# Patient Record
Sex: MALE | Race: BLACK OR AFRICAN AMERICAN | Employment: UNEMPLOYED | URBAN - METROPOLITAN AREA
[De-identification: names, ages, dates, MRNs, and addresses within clinical notes are randomized per-mention and may not be internally consistent; named-entity substitution may affect disease eponyms.]

---

## 2020-01-01 ENCOUNTER — APPOINTMENT (OUTPATIENT)
Dept: GENERAL RADIOLOGY | Age: 60
DRG: 192 | End: 2020-01-01
Attending: EMERGENCY MEDICINE
Payer: MEDICAID

## 2020-01-01 ENCOUNTER — APPOINTMENT (OUTPATIENT)
Dept: GENERAL RADIOLOGY | Age: 60
DRG: 192 | End: 2020-01-01
Attending: STUDENT IN AN ORGANIZED HEALTH CARE EDUCATION/TRAINING PROGRAM
Payer: MEDICAID

## 2020-01-01 ENCOUNTER — APPOINTMENT (OUTPATIENT)
Dept: NON INVASIVE DIAGNOSTICS | Age: 60
DRG: 192 | End: 2020-01-01
Attending: STUDENT IN AN ORGANIZED HEALTH CARE EDUCATION/TRAINING PROGRAM
Payer: MEDICAID

## 2020-01-01 ENCOUNTER — DOCUMENTATION ONLY (OUTPATIENT)
Dept: PULMONOLOGY | Age: 60
End: 2020-01-01

## 2020-01-01 ENCOUNTER — HOSPITAL ENCOUNTER (EMERGENCY)
Age: 60
Discharge: HOME | End: 2020-10-21
Attending: EMERGENCY MEDICINE | Admitting: EMERGENCY MEDICINE
Payer: MEDICAID

## 2020-01-01 ENCOUNTER — APPOINTMENT (OUTPATIENT)
Dept: ULTRASOUND IMAGING | Age: 60
DRG: 192 | End: 2020-01-01
Attending: INTERNAL MEDICINE
Payer: MEDICAID

## 2020-01-01 ENCOUNTER — APPOINTMENT (OUTPATIENT)
Dept: GENERAL RADIOLOGY | Age: 60
End: 2020-01-01
Attending: EMERGENCY MEDICINE
Payer: MEDICAID

## 2020-01-01 ENCOUNTER — PATIENT OUTREACH (OUTPATIENT)
Dept: CASE MANAGEMENT | Age: 60
End: 2020-01-01

## 2020-01-01 ENCOUNTER — HOSPITAL ENCOUNTER (INPATIENT)
Age: 60
LOS: 4 days | Discharge: HOME | DRG: 192 | End: 2020-10-30
Attending: EMERGENCY MEDICINE | Admitting: HOSPITALIST
Payer: MEDICAID

## 2020-01-01 ENCOUNTER — APPOINTMENT (OUTPATIENT)
Dept: CT IMAGING | Age: 60
DRG: 192 | End: 2020-01-01
Attending: EMERGENCY MEDICINE
Payer: MEDICAID

## 2020-01-01 ENCOUNTER — TRANSCRIBE ORDER (OUTPATIENT)
Dept: CARDIAC CATH/INVASIVE PROCEDURES | Age: 60
End: 2020-01-01

## 2020-01-01 ENCOUNTER — HOSPITAL ENCOUNTER (OUTPATIENT)
Dept: NON INVASIVE DIAGNOSTICS | Age: 60
Discharge: HOME | End: 2020-10-30

## 2020-01-01 VITALS
HEART RATE: 68 BPM | TEMPERATURE: 97.3 F | RESPIRATION RATE: 18 BRPM | WEIGHT: 176.4 LBS | OXYGEN SATURATION: 98 % | BODY MASS INDEX: 26.13 KG/M2 | HEIGHT: 69 IN | DIASTOLIC BLOOD PRESSURE: 79 MMHG | SYSTOLIC BLOOD PRESSURE: 121 MMHG

## 2020-01-01 VITALS
OXYGEN SATURATION: 98 % | SYSTOLIC BLOOD PRESSURE: 169 MMHG | HEART RATE: 86 BPM | DIASTOLIC BLOOD PRESSURE: 94 MMHG | RESPIRATION RATE: 16 BRPM

## 2020-01-01 DIAGNOSIS — I25.5 ISCHEMIC CARDIOMYOPATHY: ICD-10-CM

## 2020-01-01 DIAGNOSIS — E87.1 HYPONATREMIA: ICD-10-CM

## 2020-01-01 DIAGNOSIS — Z20.822 SUSPECTED COVID-19 VIRUS INFECTION: Primary | ICD-10-CM

## 2020-01-01 DIAGNOSIS — J20.9 ACUTE BRONCHITIS, UNSPECIFIED ORGANISM: ICD-10-CM

## 2020-01-01 DIAGNOSIS — I38 ENDOCARDITIS: Primary | ICD-10-CM

## 2020-01-01 DIAGNOSIS — J18.9 MULTIFOCAL PNEUMONIA: ICD-10-CM

## 2020-01-01 DIAGNOSIS — Z20.822 SUSPECTED COVID-19 VIRUS INFECTION: ICD-10-CM

## 2020-01-01 DIAGNOSIS — F17.210 CIGARETTE NICOTINE DEPENDENCE WITHOUT COMPLICATION: ICD-10-CM

## 2020-01-01 DIAGNOSIS — J81.0 ACUTE PULMONARY EDEMA (HCC): ICD-10-CM

## 2020-01-01 DIAGNOSIS — J96.01 ACUTE HYPOXEMIC RESPIRATORY FAILURE (HCC): Primary | ICD-10-CM

## 2020-01-01 DIAGNOSIS — E87.6 HYPOKALEMIA: ICD-10-CM

## 2020-01-01 DIAGNOSIS — I42.9 CARDIOMYOPATHY, UNSPECIFIED TYPE (HCC): ICD-10-CM

## 2020-01-01 DIAGNOSIS — I50.9 ACUTE ON CHRONIC CONGESTIVE HEART FAILURE, UNSPECIFIED HEART FAILURE TYPE (HCC): ICD-10-CM

## 2020-01-01 DIAGNOSIS — I50.23 SYSTOLIC CHF, ACUTE ON CHRONIC (HCC): ICD-10-CM

## 2020-01-01 DIAGNOSIS — J96.01 ACUTE RESPIRATORY FAILURE WITH HYPOXIA (HCC): ICD-10-CM

## 2020-01-01 DIAGNOSIS — R06.02 SHORTNESS OF BREATH: ICD-10-CM

## 2020-01-01 LAB
ALBUMIN SERPL-MCNC: 3.1 G/DL (ref 3.4–5)
ALBUMIN SERPL-MCNC: 3.1 G/DL (ref 3.4–5)
ALBUMIN SERPL-MCNC: 3.5 G/DL (ref 3.4–5)
ALBUMIN SERPL-MCNC: 3.6 G/DL (ref 3.4–5)
ALBUMIN/GLOB SERPL: 0.8 {RATIO} (ref 0.8–1.7)
ALP SERPL-CCNC: 72 U/L (ref 45–117)
ALP SERPL-CCNC: 86 U/L (ref 45–117)
ALP SERPL-CCNC: 93 U/L (ref 45–117)
ALT SERPL-CCNC: 19 U/L (ref 16–61)
ALT SERPL-CCNC: 23 U/L (ref 16–61)
ALT SERPL-CCNC: 25 U/L (ref 16–61)
AMPHET UR QL SCN: NEGATIVE
ANION GAP SERPL CALC-SCNC: 5 MMOL/L (ref 3–18)
ANION GAP SERPL CALC-SCNC: 5 MMOL/L (ref 3–18)
ANION GAP SERPL CALC-SCNC: 6 MMOL/L (ref 3–18)
ANION GAP SERPL CALC-SCNC: 8 MMOL/L (ref 3–18)
ANION GAP SERPL CALC-SCNC: 8 MMOL/L (ref 3–18)
ANION GAP SERPL CALC-SCNC: 9 MMOL/L (ref 3–18)
APPEARANCE UR: CLEAR
ARTERIAL PATENCY WRIST A: YES
AST SERPL-CCNC: 17 U/L (ref 10–38)
AST SERPL-CCNC: 19 U/L (ref 10–38)
AST SERPL-CCNC: 25 U/L (ref 10–38)
ATRIAL RATE: 61 BPM
ATRIAL RATE: 61 BPM
ATRIAL RATE: 64 BPM
ATRIAL RATE: 72 BPM
ATRIAL RATE: 81 BPM
ATRIAL RATE: 88 BPM
ATRIAL RATE: 89 BPM
ATRIAL RATE: 97 BPM
B PERT DNA SPEC QL NAA+PROBE: NOT DETECTED
BACTERIA SPEC CULT: ABNORMAL
BACTERIA SPEC CULT: ABNORMAL
BACTERIA SPEC CULT: NORMAL
BACTERIA URNS QL MICRO: ABNORMAL /HPF
BARBITURATES UR QL SCN: NEGATIVE
BASE DEFICIT BLD-SCNC: 1 MMOL/L
BASOPHILS # BLD: 0 K/UL (ref 0–0.1)
BASOPHILS NFR BLD: 0 % (ref 0–2)
BDY SITE: ABNORMAL
BENZODIAZ UR QL: NEGATIVE
BILIRUB SERPL-MCNC: 0.4 MG/DL (ref 0.2–1)
BILIRUB SERPL-MCNC: 0.4 MG/DL (ref 0.2–1)
BILIRUB SERPL-MCNC: 0.7 MG/DL (ref 0.2–1)
BILIRUB UR QL: NEGATIVE
BNP SERPL-MCNC: 3340 PG/ML (ref 0–900)
BNP SERPL-MCNC: 401 PG/ML (ref 0–900)
BNP SERPL-MCNC: 5093 PG/ML (ref 0–900)
BODY TEMPERATURE: 98.6
BORDETELLA PARAPERTUSSIS PCR, BORPAR: NOT DETECTED
BUN SERPL-MCNC: 18 MG/DL (ref 7–18)
BUN SERPL-MCNC: 23 MG/DL (ref 7–18)
BUN SERPL-MCNC: 24 MG/DL (ref 7–18)
BUN SERPL-MCNC: 25 MG/DL (ref 7–18)
BUN SERPL-MCNC: 25 MG/DL (ref 7–18)
BUN SERPL-MCNC: 26 MG/DL (ref 7–18)
BUN SERPL-MCNC: 28 MG/DL (ref 7–18)
BUN SERPL-MCNC: 6 MG/DL (ref 7–18)
BUN/CREAT SERPL: 14 (ref 12–20)
BUN/CREAT SERPL: 17 (ref 12–20)
BUN/CREAT SERPL: 20 (ref 12–20)
BUN/CREAT SERPL: 21 (ref 12–20)
BUN/CREAT SERPL: 25 (ref 12–20)
BUN/CREAT SERPL: 6 (ref 12–20)
C PNEUM DNA SPEC QL NAA+PROBE: NOT DETECTED
CALCIUM SERPL-MCNC: 8.3 MG/DL (ref 8.5–10.1)
CALCIUM SERPL-MCNC: 8.3 MG/DL (ref 8.5–10.1)
CALCIUM SERPL-MCNC: 8.4 MG/DL (ref 8.5–10.1)
CALCIUM SERPL-MCNC: 8.6 MG/DL (ref 8.5–10.1)
CALCIUM SERPL-MCNC: 8.6 MG/DL (ref 8.5–10.1)
CALCIUM SERPL-MCNC: 8.7 MG/DL (ref 8.5–10.1)
CALCIUM SERPL-MCNC: 9.2 MG/DL (ref 8.5–10.1)
CALCIUM SERPL-MCNC: 9.2 MG/DL (ref 8.5–10.1)
CALCULATED P AXIS, ECG09: 31 DEGREES
CALCULATED P AXIS, ECG09: 31 DEGREES
CALCULATED P AXIS, ECG09: 33 DEGREES
CALCULATED P AXIS, ECG09: 34 DEGREES
CALCULATED P AXIS, ECG09: 42 DEGREES
CALCULATED P AXIS, ECG09: 44 DEGREES
CALCULATED P AXIS, ECG09: 46 DEGREES
CALCULATED P AXIS, ECG09: 46 DEGREES
CALCULATED R AXIS, ECG10: -45 DEGREES
CALCULATED R AXIS, ECG10: -48 DEGREES
CALCULATED R AXIS, ECG10: -49 DEGREES
CALCULATED R AXIS, ECG10: -51 DEGREES
CALCULATED R AXIS, ECG10: -56 DEGREES
CALCULATED R AXIS, ECG10: -56 DEGREES
CALCULATED R AXIS, ECG10: -58 DEGREES
CALCULATED R AXIS, ECG10: -58 DEGREES
CALCULATED T AXIS, ECG11: -154 DEGREES
CALCULATED T AXIS, ECG11: -168 DEGREES
CALCULATED T AXIS, ECG11: -18 DEGREES
CALCULATED T AXIS, ECG11: -41 DEGREES
CALCULATED T AXIS, ECG11: -84 DEGREES
CALCULATED T AXIS, ECG11: 165 DEGREES
CALCULATED T AXIS, ECG11: 165 DEGREES
CALCULATED T AXIS, ECG11: 86 DEGREES
CANNABINOIDS UR QL SCN: NEGATIVE
CHLORIDE SERPL-SCNC: 81 MMOL/L (ref 100–111)
CHLORIDE SERPL-SCNC: 83 MMOL/L (ref 100–111)
CHLORIDE SERPL-SCNC: 86 MMOL/L (ref 100–111)
CHLORIDE SERPL-SCNC: 86 MMOL/L (ref 100–111)
CHLORIDE SERPL-SCNC: 87 MMOL/L (ref 100–111)
CHLORIDE SERPL-SCNC: 88 MMOL/L (ref 100–111)
CHLORIDE SERPL-SCNC: 90 MMOL/L (ref 100–111)
CHLORIDE SERPL-SCNC: 94 MMOL/L (ref 100–111)
CK MB CFR SERPL CALC: 4 % (ref 0–4)
CK MB CFR SERPL CALC: 4 % (ref 0–4)
CK MB SERPL-MCNC: 15 NG/ML (ref 5–25)
CK MB SERPL-MCNC: 15.7 NG/ML (ref 5–25)
CK SERPL-CCNC: 374 U/L (ref 39–308)
CK SERPL-CCNC: 396 U/L (ref 39–308)
CO2 SERPL-SCNC: 26 MMOL/L (ref 21–32)
CO2 SERPL-SCNC: 26 MMOL/L (ref 21–32)
CO2 SERPL-SCNC: 27 MMOL/L (ref 21–32)
CO2 SERPL-SCNC: 28 MMOL/L (ref 21–32)
CO2 SERPL-SCNC: 30 MMOL/L (ref 21–32)
CO2 SERPL-SCNC: 31 MMOL/L (ref 21–32)
COCAINE UR QL SCN: NEGATIVE
COLOR UR: YELLOW
COVID-19 RAPID TEST, COVR: NOT DETECTED
CREAT SERPL-MCNC: 1.02 MG/DL (ref 0.6–1.3)
CREAT SERPL-MCNC: 1.12 MG/DL (ref 0.6–1.3)
CREAT SERPL-MCNC: 1.13 MG/DL (ref 0.6–1.3)
CREAT SERPL-MCNC: 1.18 MG/DL (ref 0.6–1.3)
CREAT SERPL-MCNC: 1.24 MG/DL (ref 0.6–1.3)
CREAT SERPL-MCNC: 1.24 MG/DL (ref 0.6–1.3)
CREAT SERPL-MCNC: 1.29 MG/DL (ref 0.6–1.3)
CREAT SERPL-MCNC: 1.33 MG/DL (ref 0.6–1.3)
CRP SERPL-MCNC: 0.8 MG/DL (ref 0–0.3)
D DIMER PPP FEU-MCNC: 0.36 UG/ML(FEU)
DIAGNOSIS, 93000: NORMAL
DIFFERENTIAL METHOD BLD: ABNORMAL
ECHO AO ROOT DIAM: 4.42 CM
ECHO IVC PROX: 1.02 CM
ECHO LV INTERNAL DIMENSION DIASTOLIC: 6.32 CM (ref 4.2–5.9)
ECHO LV INTERNAL DIMENSION SYSTOLIC: 6.03 CM
ECHO LV IVSD: 1.04 CM (ref 0.6–1)
ECHO LV MASS 2D: 323.5 G (ref 88–224)
ECHO LV MASS INDEX 2D: 158.3 G/M2 (ref 49–115)
ECHO LV POSTERIOR WALL DIASTOLIC: 1.26 CM (ref 0.6–1)
ECHO RV TAPSE: 2.73 CM (ref 1.5–2)
ECHO TV REGURGITANT MAX VELOCITY: 225.54 CM/S
ECHO TV REGURGITANT PEAK GRADIENT: 20.35 MMHG
EOSINOPHIL # BLD: 0 K/UL (ref 0–0.4)
EOSINOPHIL NFR BLD: 0 % (ref 0–5)
EOSINOPHIL NFR BLD: 1 % (ref 0–5)
EPITH CASTS URNS QL MICRO: ABNORMAL /LPF (ref 0–5)
ERYTHROCYTE [DISTWIDTH] IN BLOOD BY AUTOMATED COUNT: 13.3 % (ref 11.6–14.5)
ERYTHROCYTE [DISTWIDTH] IN BLOOD BY AUTOMATED COUNT: 13.3 % (ref 11.6–14.5)
ERYTHROCYTE [DISTWIDTH] IN BLOOD BY AUTOMATED COUNT: 13.4 % (ref 11.6–14.5)
ERYTHROCYTE [DISTWIDTH] IN BLOOD BY AUTOMATED COUNT: 13.4 % (ref 11.6–14.5)
ERYTHROCYTE [DISTWIDTH] IN BLOOD BY AUTOMATED COUNT: 13.5 % (ref 11.6–14.5)
ERYTHROCYTE [DISTWIDTH] IN BLOOD BY AUTOMATED COUNT: 13.6 % (ref 11.6–14.5)
FERRITIN SERPL-MCNC: 32 NG/ML (ref 8–388)
FLUAV H1 2009 PAND RNA SPEC QL NAA+PROBE: NOT DETECTED
FLUAV H1 RNA SPEC QL NAA+PROBE: NOT DETECTED
FLUAV H3 RNA SPEC QL NAA+PROBE: NOT DETECTED
FLUAV SUBTYP SPEC NAA+PROBE: NOT DETECTED
FLUBV RNA SPEC QL NAA+PROBE: NOT DETECTED
GAS FLOW.O2 O2 DELIVERY SYS: ABNORMAL L/MIN
GAS FLOW.O2 SETTING OXYMISER: 1 L/M
GLOBULIN SER CALC-MCNC: 3.7 G/DL (ref 2–4)
GLOBULIN SER CALC-MCNC: 4.4 G/DL (ref 2–4)
GLOBULIN SER CALC-MCNC: 4.4 G/DL (ref 2–4)
GLUCOSE SERPL-MCNC: 101 MG/DL (ref 74–99)
GLUCOSE SERPL-MCNC: 104 MG/DL (ref 74–99)
GLUCOSE SERPL-MCNC: 107 MG/DL (ref 74–99)
GLUCOSE SERPL-MCNC: 117 MG/DL (ref 74–99)
GLUCOSE SERPL-MCNC: 127 MG/DL (ref 74–99)
GLUCOSE SERPL-MCNC: 135 MG/DL (ref 74–99)
GLUCOSE SERPL-MCNC: 178 MG/DL (ref 74–99)
GLUCOSE SERPL-MCNC: 98 MG/DL (ref 74–99)
GLUCOSE UR STRIP.AUTO-MCNC: NEGATIVE MG/DL
GRAM STN SPEC: ABNORMAL
HADV DNA SPEC QL NAA+PROBE: NOT DETECTED
HCO3 BLD-SCNC: 23.2 MMOL/L (ref 22–26)
HCOV 229E RNA SPEC QL NAA+PROBE: NOT DETECTED
HCOV HKU1 RNA SPEC QL NAA+PROBE: NOT DETECTED
HCOV NL63 RNA SPEC QL NAA+PROBE: NOT DETECTED
HCOV OC43 RNA SPEC QL NAA+PROBE: NOT DETECTED
HCT VFR BLD AUTO: 31.1 % (ref 36–48)
HCT VFR BLD AUTO: 31.3 % (ref 36–48)
HCT VFR BLD AUTO: 31.5 % (ref 36–48)
HCT VFR BLD AUTO: 33 % (ref 36–48)
HCT VFR BLD AUTO: 33.7 % (ref 36–48)
HCT VFR BLD AUTO: 34.9 % (ref 36–48)
HDSCOM,HDSCOM: NORMAL
HGB BLD-MCNC: 11 G/DL (ref 13–16)
HGB BLD-MCNC: 11.3 G/DL (ref 13–16)
HGB BLD-MCNC: 11.3 G/DL (ref 13–16)
HGB BLD-MCNC: 11.8 G/DL (ref 13–16)
HGB BLD-MCNC: 12.2 G/DL (ref 13–16)
HGB BLD-MCNC: 12.3 G/DL (ref 13–16)
HGB UR QL STRIP: NEGATIVE
HMPV RNA SPEC QL NAA+PROBE: NOT DETECTED
HPIV1 RNA SPEC QL NAA+PROBE: NOT DETECTED
HPIV2 RNA SPEC QL NAA+PROBE: NOT DETECTED
HPIV3 RNA SPEC QL NAA+PROBE: NOT DETECTED
HPIV4 RNA SPEC QL NAA+PROBE: NOT DETECTED
KETONES UR QL STRIP.AUTO: NEGATIVE MG/DL
L PNEUMO AG UR QL IA: NEGATIVE
LACTATE BLD-SCNC: 1.12 MMOL/L (ref 0.4–2)
LACTATE BLD-SCNC: 2.16 MMOL/L (ref 0.4–2)
LEUKOCYTE ESTERASE UR QL STRIP.AUTO: NEGATIVE
LYMPHOCYTES # BLD: 0.4 K/UL (ref 0.9–3.6)
LYMPHOCYTES # BLD: 0.6 K/UL (ref 0.9–3.6)
LYMPHOCYTES # BLD: 0.7 K/UL (ref 0.9–3.6)
LYMPHOCYTES # BLD: 1 K/UL (ref 0.9–3.6)
LYMPHOCYTES # BLD: 1.2 K/UL (ref 0.9–3.6)
LYMPHOCYTES # BLD: 2 K/UL (ref 0.9–3.6)
LYMPHOCYTES NFR BLD: 10 % (ref 21–52)
LYMPHOCYTES NFR BLD: 19 % (ref 21–52)
LYMPHOCYTES NFR BLD: 27 % (ref 21–52)
LYMPHOCYTES NFR BLD: 5 % (ref 21–52)
LYMPHOCYTES NFR BLD: 6 % (ref 21–52)
LYMPHOCYTES NFR BLD: 9 % (ref 21–52)
M PNEUMO DNA SPEC QL NAA+PROBE: NOT DETECTED
MAGNESIUM SERPL-MCNC: 1.7 MG/DL (ref 1.6–2.6)
MAGNESIUM SERPL-MCNC: 1.8 MG/DL (ref 1.6–2.6)
MAGNESIUM SERPL-MCNC: 1.9 MG/DL (ref 1.6–2.6)
MAGNESIUM SERPL-MCNC: 2.3 MG/DL (ref 1.6–2.6)
MAGNESIUM SERPL-MCNC: 2.3 MG/DL (ref 1.6–2.6)
MAGNESIUM SERPL-MCNC: 2.6 MG/DL (ref 1.6–2.6)
MCH RBC QN AUTO: 31 PG (ref 24–34)
MCH RBC QN AUTO: 31 PG (ref 24–34)
MCH RBC QN AUTO: 31.1 PG (ref 24–34)
MCH RBC QN AUTO: 31.3 PG (ref 24–34)
MCH RBC QN AUTO: 31.5 PG (ref 24–34)
MCH RBC QN AUTO: 31.6 PG (ref 24–34)
MCHC RBC AUTO-ENTMCNC: 35.2 G/DL (ref 31–37)
MCHC RBC AUTO-ENTMCNC: 35.4 G/DL (ref 31–37)
MCHC RBC AUTO-ENTMCNC: 35.8 G/DL (ref 31–37)
MCHC RBC AUTO-ENTMCNC: 35.9 G/DL (ref 31–37)
MCHC RBC AUTO-ENTMCNC: 36.1 G/DL (ref 31–37)
MCHC RBC AUTO-ENTMCNC: 36.2 G/DL (ref 31–37)
MCV RBC AUTO: 86 FL (ref 74–97)
MCV RBC AUTO: 86.6 FL (ref 74–97)
MCV RBC AUTO: 87.3 FL (ref 74–97)
MCV RBC AUTO: 87.7 FL (ref 74–97)
MCV RBC AUTO: 88.1 FL (ref 74–97)
MCV RBC AUTO: 88.4 FL (ref 74–97)
METHADONE UR QL: NEGATIVE
MONOCYTES # BLD: 0.3 K/UL (ref 0.05–1.2)
MONOCYTES # BLD: 0.5 K/UL (ref 0.05–1.2)
MONOCYTES # BLD: 0.6 K/UL (ref 0.05–1.2)
MONOCYTES # BLD: 0.8 K/UL (ref 0.05–1.2)
MONOCYTES # BLD: 1 K/UL (ref 0.05–1.2)
MONOCYTES # BLD: 1.3 K/UL (ref 0.05–1.2)
MONOCYTES NFR BLD: 17 % (ref 3–10)
MONOCYTES NFR BLD: 4 % (ref 3–10)
MONOCYTES NFR BLD: 7 % (ref 3–10)
MONOCYTES NFR BLD: 8 % (ref 3–10)
MONOCYTES NFR BLD: 9 % (ref 3–10)
MONOCYTES NFR BLD: 9 % (ref 3–10)
NEUTS SEG # BLD: 4.2 K/UL (ref 1.8–8)
NEUTS SEG # BLD: 4.3 K/UL (ref 1.8–8)
NEUTS SEG # BLD: 6.5 K/UL (ref 1.8–8)
NEUTS SEG # BLD: 7.1 K/UL (ref 1.8–8)
NEUTS SEG # BLD: 8.8 K/UL (ref 1.8–8)
NEUTS SEG # BLD: 9.5 K/UL (ref 1.8–8)
NEUTS SEG NFR BLD: 56 % (ref 40–73)
NEUTS SEG NFR BLD: 71 % (ref 40–73)
NEUTS SEG NFR BLD: 81 % (ref 40–73)
NEUTS SEG NFR BLD: 86 % (ref 40–73)
NEUTS SEG NFR BLD: 87 % (ref 40–73)
NEUTS SEG NFR BLD: 88 % (ref 40–73)
NITRITE UR QL STRIP.AUTO: NEGATIVE
O2/TOTAL GAS SETTING VFR VENT: 24 %
OPIATES UR QL: NEGATIVE
OSMOLALITY SERPL: 258 MOSM/KG H2O (ref 275–295)
OSMOLALITY UR: 269 MOSM/KG H2O (ref 50–1400)
OSMOLALITY UR: 549 MOSM/KG H2O (ref 50–1400)
P-R INTERVAL, ECG05: 120 MS
P-R INTERVAL, ECG05: 128 MS
P-R INTERVAL, ECG05: 144 MS
P-R INTERVAL, ECG05: 144 MS
P-R INTERVAL, ECG05: 148 MS
P-R INTERVAL, ECG05: 150 MS
P-R INTERVAL, ECG05: 152 MS
P-R INTERVAL, ECG05: 152 MS
PCO2 BLD: 35.6 MMHG (ref 35–45)
PCP UR QL: NEGATIVE
PH BLD: 7.42 [PH] (ref 7.35–7.45)
PH UR STRIP: 6.5 [PH] (ref 5–8)
PHOSPHATE SERPL-MCNC: 3.7 MG/DL (ref 2.5–4.9)
PHOSPHATE SERPL-MCNC: 4 MG/DL (ref 2.5–4.9)
PHOSPHATE SERPL-MCNC: 4.6 MG/DL (ref 2.5–4.9)
PHOSPHATE SERPL-MCNC: 4.7 MG/DL (ref 2.5–4.9)
PHOSPHATE SERPL-MCNC: 4.7 MG/DL (ref 2.5–4.9)
PHOSPHATE SERPL-MCNC: 5.3 MG/DL (ref 2.5–4.9)
PLATELET # BLD AUTO: 262 K/UL (ref 135–420)
PLATELET # BLD AUTO: 263 K/UL (ref 135–420)
PLATELET # BLD AUTO: 272 K/UL (ref 135–420)
PLATELET # BLD AUTO: 276 K/UL (ref 135–420)
PLATELET # BLD AUTO: 279 K/UL (ref 135–420)
PLATELET # BLD AUTO: 304 K/UL (ref 135–420)
PMV BLD AUTO: 10.6 FL (ref 9.2–11.8)
PMV BLD AUTO: 8.9 FL (ref 9.2–11.8)
PMV BLD AUTO: 9.1 FL (ref 9.2–11.8)
PMV BLD AUTO: 9.7 FL (ref 9.2–11.8)
PMV BLD AUTO: 9.8 FL (ref 9.2–11.8)
PMV BLD AUTO: 9.8 FL (ref 9.2–11.8)
PO2 BLD: 55 MMHG (ref 80–100)
POTASSIUM SERPL-SCNC: 2.5 MMOL/L (ref 3.5–5.5)
POTASSIUM SERPL-SCNC: 4.5 MMOL/L (ref 3.5–5.5)
POTASSIUM SERPL-SCNC: 4.6 MMOL/L (ref 3.5–5.5)
POTASSIUM SERPL-SCNC: 4.6 MMOL/L (ref 3.5–5.5)
POTASSIUM SERPL-SCNC: 4.9 MMOL/L (ref 3.5–5.5)
POTASSIUM SERPL-SCNC: 5.5 MMOL/L (ref 3.5–5.5)
PROCALCITONIN SERPL-MCNC: <0.05 NG/ML
PROT SERPL-MCNC: 6.8 G/DL (ref 6.4–8.2)
PROT SERPL-MCNC: 7.9 G/DL (ref 6.4–8.2)
PROT SERPL-MCNC: 8 G/DL (ref 6.4–8.2)
PROT UR STRIP-MCNC: ABNORMAL MG/DL
Q-T INTERVAL, ECG07: 388 MS
Q-T INTERVAL, ECG07: 394 MS
Q-T INTERVAL, ECG07: 454 MS
Q-T INTERVAL, ECG07: 482 MS
Q-T INTERVAL, ECG07: 484 MS
Q-T INTERVAL, ECG07: 486 MS
QRS DURATION, ECG06: 102 MS
QRS DURATION, ECG06: 104 MS
QRS DURATION, ECG06: 106 MS
QRS DURATION, ECG06: 96 MS
QRS DURATION, ECG06: 98 MS
QRS DURATION, ECG06: 98 MS
QTC CALCULATION (BEZET), ECG08: 457 MS
QTC CALCULATION (BEZET), ECG08: 457 MS
QTC CALCULATION (BEZET), ECG08: 476 MS
QTC CALCULATION (BEZET), ECG08: 492 MS
QTC CALCULATION (BEZET), ECG08: 501 MS
QTC CALCULATION (BEZET), ECG08: 529 MS
QTC CALCULATION (BEZET), ECG08: 552 MS
QTC CALCULATION (BEZET), ECG08: 559 MS
RBC # BLD AUTO: 3.52 M/UL (ref 4.7–5.5)
RBC # BLD AUTO: 3.59 M/UL (ref 4.7–5.5)
RBC # BLD AUTO: 3.64 M/UL (ref 4.7–5.5)
RBC # BLD AUTO: 3.81 M/UL (ref 4.7–5.5)
RBC # BLD AUTO: 3.86 M/UL (ref 4.7–5.5)
RBC # BLD AUTO: 3.96 M/UL (ref 4.7–5.5)
RBC #/AREA URNS HPF: ABNORMAL /HPF (ref 0–5)
RSV RNA SPEC QL NAA+PROBE: NOT DETECTED
RV+EV RNA SPEC QL NAA+PROBE: NOT DETECTED
S PNEUM AG UR QL: NEGATIVE
SAO2 % BLD: 89 % (ref 92–97)
SARS-COV-2, COV2NT: NOT DETECTED
SARS-COV-2, COV2NT: NOT DETECTED
SERVICE CMNT-IMP: ABNORMAL
SERVICE CMNT-IMP: ABNORMAL
SERVICE CMNT-IMP: NORMAL
SODIUM SERPL-SCNC: 120 MMOL/L (ref 136–145)
SODIUM SERPL-SCNC: 121 MMOL/L (ref 136–145)
SODIUM SERPL-SCNC: 121 MMOL/L (ref 136–145)
SODIUM SERPL-SCNC: 122 MMOL/L (ref 136–145)
SODIUM SERPL-SCNC: 125 MMOL/L (ref 136–145)
SODIUM SERPL-SCNC: 126 MMOL/L (ref 136–145)
SODIUM SERPL-SCNC: 126 MMOL/L (ref 136–145)
SODIUM SERPL-SCNC: 128 MMOL/L (ref 136–145)
SODIUM UR-SCNC: 53 MMOL/L (ref 20–110)
SOURCE, COVRS: NORMAL
SOURCE, COVRS: NORMAL
SP GR UR REFRACTOMETRY: >1.03 (ref 1–1.03)
SPECIMEN TYPE, XMCV1T: NORMAL
SPECIMEN TYPE, XMCV1T: NORMAL
SPECIMEN TYPE: ABNORMAL
T4 FREE SERPL-MCNC: 1.2 NG/DL (ref 0.7–1.5)
TOTAL RESP. RATE, ITRR: 16
TROPONIN I SERPL-MCNC: 0.06 NG/ML (ref 0–0.04)
TROPONIN I SERPL-MCNC: 0.06 NG/ML (ref 0–0.04)
TROPONIN I SERPL-MCNC: 0.07 NG/ML (ref 0–0.04)
TROPONIN I SERPL-MCNC: <0.02 NG/ML (ref 0–0.04)
TSH SERPL DL<=0.05 MIU/L-ACNC: 0.69 UIU/ML (ref 0.36–3.74)
UROBILINOGEN UR QL STRIP.AUTO: 0.2 EU/DL (ref 0.2–1)
VENTRICULAR RATE, ECG03: 61 BPM
VENTRICULAR RATE, ECG03: 61 BPM
VENTRICULAR RATE, ECG03: 64 BPM
VENTRICULAR RATE, ECG03: 72 BPM
VENTRICULAR RATE, ECG03: 81 BPM
VENTRICULAR RATE, ECG03: 88 BPM
VENTRICULAR RATE, ECG03: 89 BPM
VENTRICULAR RATE, ECG03: 97 BPM
WBC # BLD AUTO: 10.9 K/UL (ref 4.6–13.2)
WBC # BLD AUTO: 10.9 K/UL (ref 4.6–13.2)
WBC # BLD AUTO: 6 K/UL (ref 4.6–13.2)
WBC # BLD AUTO: 7.5 K/UL (ref 4.6–13.2)
WBC # BLD AUTO: 7.5 K/UL (ref 4.6–13.2)
WBC # BLD AUTO: 8.1 K/UL (ref 4.6–13.2)
WBC URNS QL MICRO: ABNORMAL /HPF (ref 0–4)

## 2020-01-01 PROCEDURE — 99233 SBSQ HOSP IP/OBS HIGH 50: CPT | Performed by: INTERNAL MEDICINE

## 2020-01-01 PROCEDURE — 85025 COMPLETE CBC W/AUTO DIFF WBC: CPT

## 2020-01-01 PROCEDURE — 74011250637 HC RX REV CODE- 250/637: Performed by: STUDENT IN AN ORGANIZED HEALTH CARE EDUCATION/TRAINING PROGRAM

## 2020-01-01 PROCEDURE — 2709999900 HC NON-CHARGEABLE SUPPLY

## 2020-01-01 PROCEDURE — 74011000250 HC RX REV CODE- 250: Performed by: HOSPITALIST

## 2020-01-01 PROCEDURE — 82728 ASSAY OF FERRITIN: CPT

## 2020-01-01 PROCEDURE — 93458 L HRT ARTERY/VENTRICLE ANGIO: CPT | Performed by: INTERNAL MEDICINE

## 2020-01-01 PROCEDURE — 84484 ASSAY OF TROPONIN QUANT: CPT

## 2020-01-01 PROCEDURE — 84100 ASSAY OF PHOSPHORUS: CPT

## 2020-01-01 PROCEDURE — 74011250637 HC RX REV CODE- 250/637: Performed by: PHYSICIAN ASSISTANT

## 2020-01-01 PROCEDURE — 96361 HYDRATE IV INFUSION ADD-ON: CPT

## 2020-01-01 PROCEDURE — 74011000250 HC RX REV CODE- 250: Performed by: EMERGENCY MEDICINE

## 2020-01-01 PROCEDURE — 80053 COMPREHEN METABOLIC PANEL: CPT

## 2020-01-01 PROCEDURE — 93005 ELECTROCARDIOGRAM TRACING: CPT

## 2020-01-01 PROCEDURE — 96367 TX/PROPH/DG ADDL SEQ IV INF: CPT

## 2020-01-01 PROCEDURE — 85379 FIBRIN DEGRADATION QUANT: CPT

## 2020-01-01 PROCEDURE — 80048 BASIC METABOLIC PNL TOTAL CA: CPT

## 2020-01-01 PROCEDURE — 86140 C-REACTIVE PROTEIN: CPT

## 2020-01-01 PROCEDURE — 74011250636 HC RX REV CODE- 250/636: Performed by: FAMILY MEDICINE

## 2020-01-01 PROCEDURE — 99252 IP/OBS CONSLTJ NEW/EST SF 35: CPT | Performed by: PSYCHIATRY & NEUROLOGY

## 2020-01-01 PROCEDURE — 87070 CULTURE OTHR SPECIMN AEROBIC: CPT

## 2020-01-01 PROCEDURE — 84145 PROCALCITONIN (PCT): CPT

## 2020-01-01 PROCEDURE — 84300 ASSAY OF URINE SODIUM: CPT

## 2020-01-01 PROCEDURE — 36415 COLL VENOUS BLD VENIPUNCTURE: CPT

## 2020-01-01 PROCEDURE — 83880 ASSAY OF NATRIURETIC PEPTIDE: CPT

## 2020-01-01 PROCEDURE — 74011250637 HC RX REV CODE- 250/637: Performed by: EMERGENCY MEDICINE

## 2020-01-01 PROCEDURE — B2151ZZ FLUOROSCOPY OF LEFT HEART USING LOW OSMOLAR CONTRAST: ICD-10-PCS | Performed by: INTERNAL MEDICINE

## 2020-01-01 PROCEDURE — 36600 WITHDRAWAL OF ARTERIAL BLOOD: CPT

## 2020-01-01 PROCEDURE — 74011250636 HC RX REV CODE- 250/636: Performed by: PHYSICIAN ASSISTANT

## 2020-01-01 PROCEDURE — 84295 ASSAY OF SERUM SODIUM: CPT

## 2020-01-01 PROCEDURE — 77010033678 HC OXYGEN DAILY

## 2020-01-01 PROCEDURE — 94664 DEMO&/EVAL PT USE INHALER: CPT

## 2020-01-01 PROCEDURE — 81001 URINALYSIS AUTO W/SCOPE: CPT

## 2020-01-01 PROCEDURE — 83930 ASSAY OF BLOOD OSMOLALITY: CPT

## 2020-01-01 PROCEDURE — 93308 TTE F-UP OR LMTD: CPT

## 2020-01-01 PROCEDURE — 96366 THER/PROPH/DIAG IV INF ADDON: CPT

## 2020-01-01 PROCEDURE — 74011000250 HC RX REV CODE- 250: Performed by: PHYSICIAN ASSISTANT

## 2020-01-01 PROCEDURE — 74011250637 HC RX REV CODE- 250/637: Performed by: INTERNAL MEDICINE

## 2020-01-01 PROCEDURE — 94761 N-INVAS EAR/PLS OXIMETRY MLT: CPT

## 2020-01-01 PROCEDURE — 94640 AIRWAY INHALATION TREATMENT: CPT

## 2020-01-01 PROCEDURE — 65660000000 HC RM CCU STEPDOWN

## 2020-01-01 PROCEDURE — 71275 CT ANGIOGRAPHY CHEST: CPT

## 2020-01-01 PROCEDURE — 82550 ASSAY OF CK (CPK): CPT

## 2020-01-01 PROCEDURE — 99223 1ST HOSP IP/OBS HIGH 75: CPT | Performed by: INTERNAL MEDICINE

## 2020-01-01 PROCEDURE — 77030015766: Performed by: INTERNAL MEDICINE

## 2020-01-01 PROCEDURE — 71045 X-RAY EXAM CHEST 1 VIEW: CPT

## 2020-01-01 PROCEDURE — 83605 ASSAY OF LACTIC ACID: CPT

## 2020-01-01 PROCEDURE — 83735 ASSAY OF MAGNESIUM: CPT

## 2020-01-01 PROCEDURE — 74011250637 HC RX REV CODE- 250/637: Performed by: HOSPITALIST

## 2020-01-01 PROCEDURE — 74011250636 HC RX REV CODE- 250/636: Performed by: INTERNAL MEDICINE

## 2020-01-01 PROCEDURE — 77030027845 HC BND COM RDL D-STAT TELE -B: Performed by: INTERNAL MEDICINE

## 2020-01-01 PROCEDURE — 74011000258 HC RX REV CODE- 258: Performed by: PHYSICIAN ASSISTANT

## 2020-01-01 PROCEDURE — 76705 ECHO EXAM OF ABDOMEN: CPT

## 2020-01-01 PROCEDURE — 83935 ASSAY OF URINE OSMOLALITY: CPT

## 2020-01-01 PROCEDURE — 99232 SBSQ HOSP IP/OBS MODERATE 35: CPT | Performed by: INTERNAL MEDICINE

## 2020-01-01 PROCEDURE — 74011250636 HC RX REV CODE- 250/636

## 2020-01-01 PROCEDURE — 74011250636 HC RX REV CODE- 250/636: Performed by: HOSPITALIST

## 2020-01-01 PROCEDURE — 71046 X-RAY EXAM CHEST 2 VIEWS: CPT

## 2020-01-01 PROCEDURE — 92610 EVALUATE SWALLOWING FUNCTION: CPT

## 2020-01-01 PROCEDURE — 74011000636 HC RX REV CODE- 636: Performed by: INTERNAL MEDICINE

## 2020-01-01 PROCEDURE — 74011000250 HC RX REV CODE- 250: Performed by: INTERNAL MEDICINE

## 2020-01-01 PROCEDURE — 87635 SARS-COV-2 COVID-19 AMP PRB: CPT

## 2020-01-01 PROCEDURE — 87450 LEGIONELLA PNEUMOPHILA AG, URINE: CPT

## 2020-01-01 PROCEDURE — 96375 TX/PRO/DX INJ NEW DRUG ADDON: CPT

## 2020-01-01 PROCEDURE — 74011250636 HC RX REV CODE- 250/636: Performed by: EMERGENCY MEDICINE

## 2020-01-01 PROCEDURE — 74011250637 HC RX REV CODE- 250/637

## 2020-01-01 PROCEDURE — 99223 1ST HOSP IP/OBS HIGH 75: CPT | Performed by: HOSPITALIST

## 2020-01-01 PROCEDURE — 99285 EMERGENCY DEPT VISIT HI MDM: CPT

## 2020-01-01 PROCEDURE — 96365 THER/PROPH/DIAG IV INF INIT: CPT

## 2020-01-01 PROCEDURE — 0100U RESPIRATORY PANEL,PCR,NASOPHARYNGEAL: CPT

## 2020-01-01 PROCEDURE — 77030013797 HC KT TRNSDUC PRSSR EDWD -A: Performed by: INTERNAL MEDICINE

## 2020-01-01 PROCEDURE — B2111ZZ FLUOROSCOPY OF MULTIPLE CORONARY ARTERIES USING LOW OSMOLAR CONTRAST: ICD-10-PCS | Performed by: INTERNAL MEDICINE

## 2020-01-01 PROCEDURE — 99406 BEHAV CHNG SMOKING 3-10 MIN: CPT | Performed by: INTERNAL MEDICINE

## 2020-01-01 PROCEDURE — 80069 RENAL FUNCTION PANEL: CPT

## 2020-01-01 PROCEDURE — 84439 ASSAY OF FREE THYROXINE: CPT

## 2020-01-01 PROCEDURE — 90686 IIV4 VACC NO PRSV 0.5 ML IM: CPT | Performed by: FAMILY MEDICINE

## 2020-01-01 PROCEDURE — 74011000636 HC RX REV CODE- 636: Performed by: EMERGENCY MEDICINE

## 2020-01-01 PROCEDURE — 87449 NOS EACH ORGANISM AG IA: CPT

## 2020-01-01 PROCEDURE — 90471 IMMUNIZATION ADMIN: CPT

## 2020-01-01 PROCEDURE — 80307 DRUG TEST PRSMV CHEM ANLYZR: CPT

## 2020-01-01 PROCEDURE — 87040 BLOOD CULTURE FOR BACTERIA: CPT

## 2020-01-01 PROCEDURE — C1894 INTRO/SHEATH, NON-LASER: HCPCS | Performed by: INTERNAL MEDICINE

## 2020-01-01 PROCEDURE — 74011000258 HC RX REV CODE- 258: Performed by: EMERGENCY MEDICINE

## 2020-01-01 PROCEDURE — 84443 ASSAY THYROID STIM HORMONE: CPT

## 2020-01-01 PROCEDURE — 82803 BLOOD GASES ANY COMBINATION: CPT

## 2020-01-01 PROCEDURE — 4A023N8 MEASUREMENT OF CARDIAC SAMPLING AND PRESSURE, BILATERAL, PERCUTANEOUS APPROACH: ICD-10-PCS | Performed by: INTERNAL MEDICINE

## 2020-01-01 PROCEDURE — 99152 MOD SED SAME PHYS/QHP 5/>YRS: CPT | Performed by: INTERNAL MEDICINE

## 2020-01-01 PROCEDURE — 96374 THER/PROPH/DIAG INJ IV PUSH: CPT

## 2020-01-01 RX ORDER — MIDAZOLAM HYDROCHLORIDE 1 MG/ML
INJECTION, SOLUTION INTRAMUSCULAR; INTRAVENOUS AS NEEDED
Status: DISCONTINUED | OUTPATIENT
Start: 2020-01-01 | End: 2020-01-01 | Stop reason: HOSPADM

## 2020-01-01 RX ORDER — SODIUM CHLORIDE 0.9 % (FLUSH) 0.9 %
5-40 SYRINGE (ML) INJECTION EVERY 8 HOURS
Status: DISCONTINUED | OUTPATIENT
Start: 2020-01-01 | End: 2020-01-01 | Stop reason: HOSPADM

## 2020-01-01 RX ORDER — POTASSIUM CHLORIDE 750 MG/1
10 TABLET, FILM COATED, EXTENDED RELEASE ORAL 2 TIMES DAILY
Qty: 8 TAB | Refills: 0 | Status: SHIPPED | OUTPATIENT
Start: 2020-01-01 | End: 2020-01-01

## 2020-01-01 RX ORDER — MAGNESIUM SULFATE HEPTAHYDRATE 40 MG/ML
2 INJECTION, SOLUTION INTRAVENOUS ONCE
Status: COMPLETED | OUTPATIENT
Start: 2020-01-01 | End: 2020-01-01

## 2020-01-01 RX ORDER — PREDNISONE 50 MG/1
50 TABLET ORAL DAILY
Qty: 3 TAB | Refills: 0 | Status: SHIPPED | OUTPATIENT
Start: 2020-01-01 | End: 2020-01-01

## 2020-01-01 RX ORDER — HYDRALAZINE HYDROCHLORIDE 50 MG/1
50 TABLET, FILM COATED ORAL 3 TIMES DAILY
Status: DISCONTINUED | OUTPATIENT
Start: 2020-01-01 | End: 2020-01-01 | Stop reason: HOSPADM

## 2020-01-01 RX ORDER — METOPROLOL TARTRATE 25 MG/1
12.5 TABLET, FILM COATED ORAL EVERY 12 HOURS
Status: DISCONTINUED | OUTPATIENT
Start: 2020-01-01 | End: 2020-01-01

## 2020-01-01 RX ORDER — ONDANSETRON 2 MG/ML
4 INJECTION INTRAMUSCULAR; INTRAVENOUS
Status: DISCONTINUED | OUTPATIENT
Start: 2020-01-01 | End: 2020-01-01

## 2020-01-01 RX ORDER — DEXAMETHASONE SODIUM PHOSPHATE 4 MG/ML
10 INJECTION, SOLUTION INTRA-ARTICULAR; INTRALESIONAL; INTRAMUSCULAR; INTRAVENOUS; SOFT TISSUE
Status: COMPLETED | OUTPATIENT
Start: 2020-01-01 | End: 2020-01-01

## 2020-01-01 RX ORDER — FUROSEMIDE 20 MG/1
20 TABLET ORAL DAILY
Qty: 30 TAB | Refills: 0 | Status: SHIPPED | OUTPATIENT
Start: 2020-01-01

## 2020-01-01 RX ORDER — LEVOFLOXACIN 5 MG/ML
500 INJECTION, SOLUTION INTRAVENOUS ONCE
Status: COMPLETED | OUTPATIENT
Start: 2020-01-01 | End: 2020-01-01

## 2020-01-01 RX ORDER — DEXAMETHASONE SODIUM PHOSPHATE 4 MG/ML
6 INJECTION, SOLUTION INTRA-ARTICULAR; INTRALESIONAL; INTRAMUSCULAR; INTRAVENOUS; SOFT TISSUE EVERY 24 HOURS
Status: DISCONTINUED | OUTPATIENT
Start: 2020-01-01 | End: 2020-01-01

## 2020-01-01 RX ORDER — GUAIFENESIN 100 MG/5ML
LIQUID (ML) ORAL
Status: COMPLETED
Start: 2020-01-01 | End: 2020-01-01

## 2020-01-01 RX ORDER — HEPARIN SODIUM 1000 [USP'U]/ML
INJECTION, SOLUTION INTRAVENOUS; SUBCUTANEOUS AS NEEDED
Status: DISCONTINUED | OUTPATIENT
Start: 2020-01-01 | End: 2020-01-01 | Stop reason: HOSPADM

## 2020-01-01 RX ORDER — HALOPERIDOL 10 MG/1
10 TABLET ORAL DAILY
COMMUNITY
Start: 2020-01-01

## 2020-01-01 RX ORDER — PANTOPRAZOLE SODIUM 40 MG/1
40 TABLET, DELAYED RELEASE ORAL DAILY
Status: DISCONTINUED | OUTPATIENT
Start: 2020-01-01 | End: 2020-01-01 | Stop reason: HOSPADM

## 2020-01-01 RX ORDER — HYDRALAZINE HYDROCHLORIDE 25 MG/1
25 TABLET, FILM COATED ORAL 3 TIMES DAILY
Status: DISCONTINUED | OUTPATIENT
Start: 2020-01-01 | End: 2020-01-01

## 2020-01-01 RX ORDER — DOXYCYCLINE HYCLATE 100 MG
100 TABLET ORAL 2 TIMES DAILY
Qty: 14 TAB | Refills: 0 | Status: SHIPPED | OUTPATIENT
Start: 2020-01-01 | End: 2020-01-01

## 2020-01-01 RX ORDER — FENTANYL CITRATE 50 UG/ML
INJECTION, SOLUTION INTRAMUSCULAR; INTRAVENOUS AS NEEDED
Status: DISCONTINUED | OUTPATIENT
Start: 2020-01-01 | End: 2020-01-01 | Stop reason: HOSPADM

## 2020-01-01 RX ORDER — FOLIC ACID 5 MG/ML
1 INJECTION, SOLUTION INTRAMUSCULAR; INTRAVENOUS; SUBCUTANEOUS DAILY
Status: DISCONTINUED | OUTPATIENT
Start: 2020-01-01 | End: 2020-01-01

## 2020-01-01 RX ORDER — SODIUM CHLORIDE 0.9 % (FLUSH) 0.9 %
5-40 SYRINGE (ML) INJECTION AS NEEDED
Status: DISCONTINUED | OUTPATIENT
Start: 2020-01-01 | End: 2020-01-01 | Stop reason: HOSPADM

## 2020-01-01 RX ORDER — IPRATROPIUM BROMIDE AND ALBUTEROL SULFATE 2.5; .5 MG/3ML; MG/3ML
3 SOLUTION RESPIRATORY (INHALATION)
Status: DISCONTINUED | OUTPATIENT
Start: 2020-01-01 | End: 2020-01-01

## 2020-01-01 RX ORDER — METOPROLOL TARTRATE 25 MG/1
25 TABLET, FILM COATED ORAL EVERY 12 HOURS
Status: DISCONTINUED | OUTPATIENT
Start: 2020-01-01 | End: 2020-01-01

## 2020-01-01 RX ORDER — ACETAMINOPHEN 325 MG/1
650 TABLET ORAL
Status: DISCONTINUED | OUTPATIENT
Start: 2020-01-01 | End: 2020-01-01 | Stop reason: HOSPADM

## 2020-01-01 RX ORDER — LIDOCAINE HYDROCHLORIDE 10 MG/ML
INJECTION, SOLUTION EPIDURAL; INFILTRATION; INTRACAUDAL; PERINEURAL AS NEEDED
Status: DISCONTINUED | OUTPATIENT
Start: 2020-01-01 | End: 2020-01-01 | Stop reason: HOSPADM

## 2020-01-01 RX ORDER — LANOLIN ALCOHOL/MO/W.PET/CERES
400 CREAM (GRAM) TOPICAL 2 TIMES DAILY
Qty: 6 TAB | Refills: 0 | Status: SHIPPED | OUTPATIENT
Start: 2020-01-01 | End: 2020-01-01

## 2020-01-01 RX ORDER — FERROUS SULFATE 325(65) MG
325 TABLET, DELAYED RELEASE (ENTERIC COATED) ORAL 2 TIMES DAILY WITH MEALS
COMMUNITY
Start: 2020-01-01

## 2020-01-01 RX ORDER — CEFDINIR 300 MG/1
300 CAPSULE ORAL 2 TIMES DAILY
Qty: 14 CAP | Refills: 0 | Status: SHIPPED | OUTPATIENT
Start: 2020-01-01 | End: 2020-01-01

## 2020-01-01 RX ORDER — OMEPRAZOLE 20 MG/1
CAPSULE, DELAYED RELEASE ORAL
COMMUNITY
Start: 2020-01-01

## 2020-01-01 RX ORDER — AMLODIPINE BESYLATE 10 MG/1
10 TABLET ORAL
COMMUNITY
Start: 2020-01-01 | End: 2020-01-01

## 2020-01-01 RX ORDER — FUROSEMIDE 10 MG/ML
20 INJECTION INTRAMUSCULAR; INTRAVENOUS 2 TIMES DAILY
Status: COMPLETED | OUTPATIENT
Start: 2020-01-01 | End: 2020-01-01

## 2020-01-01 RX ORDER — ONDANSETRON 2 MG/ML
INJECTION INTRAMUSCULAR; INTRAVENOUS
Status: COMPLETED
Start: 2020-01-01 | End: 2020-01-01

## 2020-01-01 RX ORDER — CARVEDILOL 12.5 MG/1
12.5 TABLET ORAL 2 TIMES DAILY WITH MEALS
Status: DISCONTINUED | OUTPATIENT
Start: 2020-01-01 | End: 2020-01-01 | Stop reason: HOSPADM

## 2020-01-01 RX ORDER — ACETAMINOPHEN 650 MG/1
650 SUPPOSITORY RECTAL
Status: DISCONTINUED | OUTPATIENT
Start: 2020-01-01 | End: 2020-01-01 | Stop reason: HOSPADM

## 2020-01-01 RX ORDER — POTASSIUM CHLORIDE 7.45 MG/ML
10 INJECTION INTRAVENOUS
Status: COMPLETED | OUTPATIENT
Start: 2020-01-01 | End: 2020-01-01

## 2020-01-01 RX ORDER — FUROSEMIDE 20 MG/1
20 TABLET ORAL DAILY
Status: DISCONTINUED | OUTPATIENT
Start: 2020-01-01 | End: 2020-01-01 | Stop reason: HOSPADM

## 2020-01-01 RX ORDER — POTASSIUM CHLORIDE 20 MEQ/1
40 TABLET, EXTENDED RELEASE ORAL
Status: COMPLETED | OUTPATIENT
Start: 2020-01-01 | End: 2020-01-01

## 2020-01-01 RX ORDER — FUROSEMIDE 10 MG/ML
20 INJECTION INTRAMUSCULAR; INTRAVENOUS ONCE
Status: COMPLETED | OUTPATIENT
Start: 2020-01-01 | End: 2020-01-01

## 2020-01-01 RX ORDER — ENOXAPARIN SODIUM 100 MG/ML
40 INJECTION SUBCUTANEOUS DAILY
Status: DISCONTINUED | OUTPATIENT
Start: 2020-01-01 | End: 2020-01-01 | Stop reason: HOSPADM

## 2020-01-01 RX ORDER — VERAPAMIL HYDROCHLORIDE 2.5 MG/ML
INJECTION, SOLUTION INTRAVENOUS AS NEEDED
Status: DISCONTINUED | OUTPATIENT
Start: 2020-01-01 | End: 2020-01-01 | Stop reason: HOSPADM

## 2020-01-01 RX ORDER — DEXAMETHASONE SODIUM PHOSPHATE 4 MG/ML
6 INJECTION, SOLUTION INTRA-ARTICULAR; INTRALESIONAL; INTRAMUSCULAR; INTRAVENOUS; SOFT TISSUE EVERY 12 HOURS
Status: DISCONTINUED | OUTPATIENT
Start: 2020-01-01 | End: 2020-01-01

## 2020-01-01 RX ORDER — IPRATROPIUM BROMIDE AND ALBUTEROL SULFATE 2.5; .5 MG/3ML; MG/3ML
6 SOLUTION RESPIRATORY (INHALATION)
Status: COMPLETED | OUTPATIENT
Start: 2020-01-01 | End: 2020-01-01

## 2020-01-01 RX ORDER — LISINOPRIL 20 MG/1
40 TABLET ORAL DAILY
Status: DISCONTINUED | OUTPATIENT
Start: 2020-01-01 | End: 2020-01-01 | Stop reason: HOSPADM

## 2020-01-01 RX ORDER — LISINOPRIL 5 MG/1
TABLET ORAL
COMMUNITY
Start: 2020-03-13 | End: 2020-01-01

## 2020-01-01 RX ORDER — GUAIFENESIN 100 MG/5ML
81 LIQUID (ML) ORAL ONCE
Status: COMPLETED | OUTPATIENT
Start: 2020-01-01 | End: 2020-01-01

## 2020-01-01 RX ORDER — POLYETHYLENE GLYCOL 3350 17 G/17G
17 POWDER, FOR SOLUTION ORAL DAILY PRN
Status: DISCONTINUED | OUTPATIENT
Start: 2020-01-01 | End: 2020-01-01 | Stop reason: HOSPADM

## 2020-01-01 RX ORDER — CARVEDILOL 12.5 MG/1
12.5 TABLET ORAL 2 TIMES DAILY WITH MEALS
Qty: 60 TAB | Refills: 0 | Status: SHIPPED | OUTPATIENT
Start: 2020-01-01

## 2020-01-01 RX ORDER — ACETAMINOPHEN 160 MG/5ML
500 SUSPENSION, ORAL (FINAL DOSE FORM) ORAL 2 TIMES DAILY
COMMUNITY
Start: 2020-01-01

## 2020-01-01 RX ORDER — ONDANSETRON 2 MG/ML
4 INJECTION INTRAMUSCULAR; INTRAVENOUS
Status: COMPLETED | OUTPATIENT
Start: 2020-01-01 | End: 2020-01-01

## 2020-01-01 RX ORDER — HALOPERIDOL 10 MG/1
10 TABLET ORAL DAILY
Status: DISCONTINUED | OUTPATIENT
Start: 2020-01-01 | End: 2020-01-01 | Stop reason: HOSPADM

## 2020-01-01 RX ORDER — HYDRALAZINE HYDROCHLORIDE 50 MG/1
50 TABLET, FILM COATED ORAL 3 TIMES DAILY
Qty: 90 TAB | Refills: 0 | Status: SHIPPED | OUTPATIENT
Start: 2020-01-01 | End: 2020-01-01

## 2020-01-01 RX ORDER — SODIUM CHLORIDE 9 MG/ML
75 INJECTION, SOLUTION INTRAVENOUS CONTINUOUS
Status: DISCONTINUED | OUTPATIENT
Start: 2020-01-01 | End: 2020-01-01

## 2020-01-01 RX ORDER — ONDANSETRON 4 MG/1
4 TABLET, ORALLY DISINTEGRATING ORAL
Status: DISCONTINUED | OUTPATIENT
Start: 2020-01-01 | End: 2020-01-01

## 2020-01-01 RX ORDER — HYDRALAZINE HYDROCHLORIDE 20 MG/ML
10 INJECTION INTRAMUSCULAR; INTRAVENOUS
Status: DISCONTINUED | OUTPATIENT
Start: 2020-01-01 | End: 2020-01-01 | Stop reason: HOSPADM

## 2020-01-01 RX ORDER — IPRATROPIUM BROMIDE AND ALBUTEROL SULFATE 2.5; .5 MG/3ML; MG/3ML
9 SOLUTION RESPIRATORY (INHALATION) ONCE
Status: COMPLETED | OUTPATIENT
Start: 2020-01-01 | End: 2020-01-01

## 2020-01-01 RX ADMIN — SODIUM CHLORIDE 75 ML/HR: 900 INJECTION, SOLUTION INTRAVENOUS at 07:49

## 2020-01-01 RX ADMIN — FUROSEMIDE 20 MG: 10 INJECTION, SOLUTION INTRAMUSCULAR; INTRAVENOUS at 18:01

## 2020-01-01 RX ADMIN — INFLUENZA VIRUS VACCINE 0.5 ML: 15; 15; 15; 15 SUSPENSION INTRAMUSCULAR at 05:18

## 2020-01-01 RX ADMIN — HYDRALAZINE HYDROCHLORIDE 25 MG: 25 TABLET, FILM COATED ORAL at 03:58

## 2020-01-01 RX ADMIN — LEVOFLOXACIN 500 MG: 5 INJECTION, SOLUTION INTRAVENOUS at 03:35

## 2020-01-01 RX ADMIN — AZITHROMYCIN MONOHYDRATE 500 MG: 500 INJECTION, POWDER, LYOPHILIZED, FOR SOLUTION INTRAVENOUS at 21:22

## 2020-01-01 RX ADMIN — DOXYCYCLINE 100 MG: 100 INJECTION, POWDER, LYOPHILIZED, FOR SOLUTION INTRAVENOUS at 18:45

## 2020-01-01 RX ADMIN — POTASSIUM CHLORIDE 10 MEQ: 7.46 INJECTION, SOLUTION INTRAVENOUS at 13:05

## 2020-01-01 RX ADMIN — Medication 81 MG: at 08:25

## 2020-01-01 RX ADMIN — METOPROLOL TARTRATE 25 MG: 25 TABLET, FILM COATED ORAL at 08:45

## 2020-01-01 RX ADMIN — ENOXAPARIN SODIUM 40 MG: 40 INJECTION SUBCUTANEOUS at 08:45

## 2020-01-01 RX ADMIN — HYDRALAZINE HYDROCHLORIDE 50 MG: 50 TABLET, FILM COATED ORAL at 09:18

## 2020-01-01 RX ADMIN — FOLIC ACID: 5 INJECTION, SOLUTION INTRAMUSCULAR; INTRAVENOUS; SUBCUTANEOUS at 08:46

## 2020-01-01 RX ADMIN — FUROSEMIDE 20 MG: 10 INJECTION, SOLUTION INTRAMUSCULAR; INTRAVENOUS at 03:58

## 2020-01-01 RX ADMIN — MAGNESIUM SULFATE HEPTAHYDRATE 2 G: 40 INJECTION, SOLUTION INTRAVENOUS at 13:50

## 2020-01-01 RX ADMIN — THIAMINE HYDROCHLORIDE 200 MG: 100 INJECTION, SOLUTION INTRAMUSCULAR; INTRAVENOUS at 14:50

## 2020-01-01 RX ADMIN — FOLIC ACID: 5 INJECTION, SOLUTION INTRAMUSCULAR; INTRAVENOUS; SUBCUTANEOUS at 09:18

## 2020-01-01 RX ADMIN — PANTOPRAZOLE SODIUM 40 MG: 40 TABLET, DELAYED RELEASE ORAL at 08:45

## 2020-01-01 RX ADMIN — PANTOPRAZOLE SODIUM 40 MG: 40 TABLET, DELAYED RELEASE ORAL at 09:18

## 2020-01-01 RX ADMIN — CEFTRIAXONE SODIUM 1 G: 1 INJECTION, POWDER, FOR SOLUTION INTRAMUSCULAR; INTRAVENOUS at 22:02

## 2020-01-01 RX ADMIN — ONDANSETRON 4 MG: 2 INJECTION INTRAMUSCULAR; INTRAVENOUS at 22:27

## 2020-01-01 RX ADMIN — FOLIC ACID: 5 INJECTION, SOLUTION INTRAMUSCULAR; INTRAVENOUS; SUBCUTANEOUS at 14:51

## 2020-01-01 RX ADMIN — METOPROLOL TARTRATE 25 MG: 25 TABLET, FILM COATED ORAL at 09:18

## 2020-01-01 RX ADMIN — CARVEDILOL 12.5 MG: 12.5 TABLET, FILM COATED ORAL at 17:18

## 2020-01-01 RX ADMIN — WATER 1 G: 1 INJECTION INTRAMUSCULAR; INTRAVENOUS; SUBCUTANEOUS at 16:30

## 2020-01-01 RX ADMIN — HALOPERIDOL 10 MG: 10 TABLET ORAL at 14:37

## 2020-01-01 RX ADMIN — IPRATROPIUM BROMIDE AND ALBUTEROL 2 PUFF: 20; 100 SPRAY, METERED RESPIRATORY (INHALATION) at 20:24

## 2020-01-01 RX ADMIN — Medication 10 ML: at 06:14

## 2020-01-01 RX ADMIN — IPRATROPIUM BROMIDE AND ALBUTEROL SULFATE 9 ML: .5; 3 SOLUTION RESPIRATORY (INHALATION) at 22:01

## 2020-01-01 RX ADMIN — HYDRALAZINE HYDROCHLORIDE 50 MG: 50 TABLET, FILM COATED ORAL at 21:06

## 2020-01-01 RX ADMIN — IPRATROPIUM BROMIDE AND ALBUTEROL 2 PUFF: 20; 100 SPRAY, METERED RESPIRATORY (INHALATION) at 19:54

## 2020-01-01 RX ADMIN — HALOPERIDOL 10 MG: 10 TABLET ORAL at 09:27

## 2020-01-01 RX ADMIN — Medication 10 ML: at 17:04

## 2020-01-01 RX ADMIN — IPRATROPIUM BROMIDE AND ALBUTEROL 2 PUFF: 20; 100 SPRAY, METERED RESPIRATORY (INHALATION) at 14:08

## 2020-01-01 RX ADMIN — CARVEDILOL 12.5 MG: 12.5 TABLET, FILM COATED ORAL at 09:27

## 2020-01-01 RX ADMIN — CEFTRIAXONE SODIUM 2 G: 2 INJECTION, POWDER, FOR SOLUTION INTRAMUSCULAR; INTRAVENOUS at 21:04

## 2020-01-01 RX ADMIN — HYDRALAZINE HYDROCHLORIDE 10 MG: 20 INJECTION INTRAMUSCULAR; INTRAVENOUS at 18:35

## 2020-01-01 RX ADMIN — IPRATROPIUM BROMIDE AND ALBUTEROL 2 PUFF: 20; 100 SPRAY, METERED RESPIRATORY (INHALATION) at 08:32

## 2020-01-01 RX ADMIN — IPRATROPIUM BROMIDE AND ALBUTEROL SULFATE 6 ML: .5; 3 SOLUTION RESPIRATORY (INHALATION) at 11:05

## 2020-01-01 RX ADMIN — IPRATROPIUM BROMIDE AND ALBUTEROL 2 PUFF: 20; 100 SPRAY, METERED RESPIRATORY (INHALATION) at 14:00

## 2020-01-01 RX ADMIN — IOPAMIDOL 100 ML: 755 INJECTION, SOLUTION INTRAVENOUS at 01:24

## 2020-01-01 RX ADMIN — CEFTRIAXONE SODIUM 2 G: 2 INJECTION, POWDER, FOR SOLUTION INTRAMUSCULAR; INTRAVENOUS at 21:16

## 2020-01-01 RX ADMIN — HYDRALAZINE HYDROCHLORIDE 50 MG: 50 TABLET, FILM COATED ORAL at 21:09

## 2020-01-01 RX ADMIN — DEXAMETHASONE SODIUM PHOSPHATE 6 MG: 4 INJECTION, SOLUTION INTRAMUSCULAR; INTRAVENOUS at 21:31

## 2020-01-01 RX ADMIN — ENOXAPARIN SODIUM 40 MG: 40 INJECTION SUBCUTANEOUS at 11:38

## 2020-01-01 RX ADMIN — FUROSEMIDE 20 MG: 10 INJECTION, SOLUTION INTRAMUSCULAR; INTRAVENOUS at 12:02

## 2020-01-01 RX ADMIN — HYDRALAZINE HYDROCHLORIDE 50 MG: 50 TABLET, FILM COATED ORAL at 16:23

## 2020-01-01 RX ADMIN — Medication 10 ML: at 13:03

## 2020-01-01 RX ADMIN — DEXAMETHASONE SODIUM PHOSPHATE 6 MG: 4 INJECTION, SOLUTION INTRAMUSCULAR; INTRAVENOUS at 11:38

## 2020-01-01 RX ADMIN — FUROSEMIDE 20 MG: 20 TABLET ORAL at 13:03

## 2020-01-01 RX ADMIN — FOLIC ACID: 5 INJECTION, SOLUTION INTRAMUSCULAR; INTRAVENOUS; SUBCUTANEOUS at 13:00

## 2020-01-01 RX ADMIN — IPRATROPIUM BROMIDE AND ALBUTEROL 2 PUFF: 20; 100 SPRAY, METERED RESPIRATORY (INHALATION) at 08:00

## 2020-01-01 RX ADMIN — HYDRALAZINE HYDROCHLORIDE 50 MG: 50 TABLET, FILM COATED ORAL at 18:01

## 2020-01-01 RX ADMIN — AZITHROMYCIN MONOHYDRATE 500 MG: 500 INJECTION, POWDER, LYOPHILIZED, FOR SOLUTION INTRAVENOUS at 21:06

## 2020-01-01 RX ADMIN — LISINOPRIL 40 MG: 20 TABLET ORAL at 14:54

## 2020-01-01 RX ADMIN — HYDRALAZINE HYDROCHLORIDE 50 MG: 50 TABLET, FILM COATED ORAL at 17:06

## 2020-01-01 RX ADMIN — DEXAMETHASONE SODIUM PHOSPHATE 10 MG: 4 INJECTION, SOLUTION INTRAMUSCULAR; INTRAVENOUS at 22:00

## 2020-01-01 RX ADMIN — HYDRALAZINE HYDROCHLORIDE 50 MG: 50 TABLET, FILM COATED ORAL at 17:15

## 2020-01-01 RX ADMIN — HYDRALAZINE HYDROCHLORIDE 10 MG: 20 INJECTION INTRAMUSCULAR; INTRAVENOUS at 00:44

## 2020-01-01 RX ADMIN — DEXAMETHASONE SODIUM PHOSPHATE 6 MG: 4 INJECTION, SOLUTION INTRAMUSCULAR; INTRAVENOUS at 22:01

## 2020-01-01 RX ADMIN — POTASSIUM CHLORIDE 40 MEQ: 1500 TABLET, EXTENDED RELEASE ORAL at 11:18

## 2020-01-01 RX ADMIN — THIAMINE HYDROCHLORIDE 200 MG: 100 INJECTION, SOLUTION INTRAMUSCULAR; INTRAVENOUS at 13:00

## 2020-01-01 RX ADMIN — THIAMINE HYDROCHLORIDE 200 MG: 100 INJECTION, SOLUTION INTRAMUSCULAR; INTRAVENOUS at 09:18

## 2020-01-01 RX ADMIN — METOPROLOL TARTRATE 12.5 MG: 25 TABLET, FILM COATED ORAL at 21:16

## 2020-01-01 RX ADMIN — HYDRALAZINE HYDROCHLORIDE 25 MG: 25 TABLET, FILM COATED ORAL at 08:45

## 2020-01-01 RX ADMIN — METHYLPREDNISOLONE SODIUM SUCCINATE 125 MG: 125 INJECTION, POWDER, FOR SOLUTION INTRAMUSCULAR; INTRAVENOUS at 11:09

## 2020-01-01 RX ADMIN — POTASSIUM CHLORIDE 10 MEQ: 7.46 INJECTION, SOLUTION INTRAVENOUS at 16:30

## 2020-01-01 RX ADMIN — IPRATROPIUM BROMIDE AND ALBUTEROL 2 PUFF: 20; 100 SPRAY, METERED RESPIRATORY (INHALATION) at 02:20

## 2020-01-01 RX ADMIN — THIAMINE HYDROCHLORIDE 200 MG: 100 INJECTION, SOLUTION INTRAMUSCULAR; INTRAVENOUS at 08:45

## 2020-01-01 RX ADMIN — METOPROLOL TARTRATE 25 MG: 25 TABLET, FILM COATED ORAL at 21:05

## 2020-01-01 RX ADMIN — HYDRALAZINE HYDROCHLORIDE 50 MG: 50 TABLET, FILM COATED ORAL at 09:27

## 2020-01-01 RX ADMIN — PANTOPRAZOLE SODIUM 40 MG: 40 TABLET, DELAYED RELEASE ORAL at 09:27

## 2020-01-01 RX ADMIN — ASPIRIN 81 MG: 81 TABLET, CHEWABLE ORAL at 08:25

## 2020-01-01 RX ADMIN — DEXAMETHASONE SODIUM PHOSPHATE 6 MG: 4 INJECTION, SOLUTION INTRAMUSCULAR; INTRAVENOUS at 21:16

## 2020-01-01 RX ADMIN — HALOPERIDOL 10 MG: 10 TABLET ORAL at 09:18

## 2020-01-01 RX ADMIN — HALOPERIDOL 10 MG: 10 TABLET ORAL at 08:45

## 2020-01-01 RX ADMIN — AZITHROMYCIN MONOHYDRATE 500 MG: 500 INJECTION, POWDER, LYOPHILIZED, FOR SOLUTION INTRAVENOUS at 22:02

## 2020-01-01 RX ADMIN — HYDRALAZINE HYDROCHLORIDE 50 MG: 50 TABLET, FILM COATED ORAL at 22:02

## 2020-01-01 RX ADMIN — CARVEDILOL 12.5 MG: 12.5 TABLET, FILM COATED ORAL at 16:23

## 2020-01-01 RX ADMIN — ENOXAPARIN SODIUM 40 MG: 40 INJECTION SUBCUTANEOUS at 14:36

## 2020-01-01 RX ADMIN — CARVEDILOL 12.5 MG: 12.5 TABLET, FILM COATED ORAL at 17:06

## 2020-01-01 RX ADMIN — POTASSIUM CHLORIDE 10 MEQ: 7.46 INJECTION, SOLUTION INTRAVENOUS at 11:12

## 2020-01-01 RX ADMIN — PANTOPRAZOLE SODIUM 40 MG: 40 TABLET, DELAYED RELEASE ORAL at 14:37

## 2020-01-01 RX ADMIN — ENOXAPARIN SODIUM 40 MG: 40 INJECTION SUBCUTANEOUS at 09:18

## 2020-01-01 RX ADMIN — POTASSIUM CHLORIDE 10 MEQ: 7.46 INJECTION, SOLUTION INTRAVENOUS at 12:24

## 2020-01-01 RX ADMIN — SODIUM CHLORIDE, SODIUM LACTATE, POTASSIUM CHLORIDE, AND CALCIUM CHLORIDE 500 ML: 600; 310; 30; 20 INJECTION, SOLUTION INTRAVENOUS at 11:18

## 2020-01-01 RX ADMIN — Medication 10 ML: at 21:12

## 2020-10-21 NOTE — DISCHARGE INSTRUCTIONS
Patient Education   1. Take the prednisone until it is gone. Use the albuterol inhaler as needed for cough or shortness of breath. 2.  Take the antibiotics until they both are gone as well. 3.  Take the potassium and magnesium each day for 3 days. Take the magnesium at a different time than the doxycycline, approximately 4 hours apart. 4. Return at once if you help any chest pain, shortness of breath, high fever, passing out or any other symptoms that concern you. Bronchitis: Care Instructions  Your Care Instructions     Bronchitis is inflammation of the bronchial tubes, which carry air to the lungs. The tubes swell and produce mucus, or phlegm. The mucus and inflamed bronchial tubes make you cough. You may have trouble breathing. Most cases of bronchitis are caused by viruses like those that cause colds. Antibiotics usually do not help and they may be harmful. Bronchitis usually develops rapidly and lasts about 2 to 3 weeks in otherwise healthy people. Follow-up care is a key part of your treatment and safety. Be sure to make and go to all appointments, and call your doctor if you are having problems. It's also a good idea to know your test results and keep a list of the medicines you take. How can you care for yourself at home? · Take all medicines exactly as prescribed. Call your doctor if you think you are having a problem with your medicine. · Get some extra rest.  · Take an over-the-counter pain medicine, such as acetaminophen (Tylenol), ibuprofen (Advil, Motrin), or naproxen (Aleve) to reduce fever and relieve body aches. Read and follow all instructions on the label. · Do not take two or more pain medicines at the same time unless the doctor told you to. Many pain medicines have acetaminophen, which is Tylenol. Too much acetaminophen (Tylenol) can be harmful. · Take an over-the-counter cough medicine that contains dextromethorphan to help quiet a dry, hacking cough so that you can sleep.  Avoid cough medicines that have more than one active ingredient. Read and follow all instructions on the label. · Breathe moist air from a humidifier, hot shower, or sink filled with hot water. The heat and moisture will thin mucus so you can cough it out. · Do not smoke. Smoking can make bronchitis worse. If you need help quitting, talk to your doctor about stop-smoking programs and medicines. These can increase your chances of quitting for good. When should you call for help? Call 911 anytime you think you may need emergency care. For example, call if:    · You have severe trouble breathing. Call your doctor now or seek immediate medical care if:    · You have new or worse trouble breathing.     · You cough up dark brown or bloody mucus (sputum).     · You have a new or higher fever.     · You have a new rash. Watch closely for changes in your health, and be sure to contact your doctor if:    · You cough more deeply or more often, especially if you notice more mucus or a change in the color of your mucus.     · You are not getting better as expected. Where can you learn more? Go to http://www.gray.com/  Enter H333 in the search box to learn more about \"Bronchitis: Care Instructions. \"  Current as of: February 24, 2020               Content Version: 12.6  © 7650-6411 [employer]. Care instructions adapted under license by [employer] (which disclaims liability or warranty for this information). If you have questions about a medical condition or this instruction, always ask your healthcare professional. [place] [address] any warranty or liability for your use of this information. Patient Education        Hypokalemia: Care Instructions  Your Care Instructions     Hypokalemia (say \"bs-ic-sqn-COY-dagmar-uh\") is a low level of potassium. The heart, muscles, kidneys, and nervous system all need potassium to work well.   This problem has many different causes. Kidney problems, diet, and medicines like diuretics and laxatives can cause it. So can vomiting or diarrhea. In some cases, cancer is the cause. Your doctor may do tests to find the cause of your low potassium levels. You may need medicines to bring your potassium levels back to normal. You may also need regular blood tests to check your potassium. If you have very low potassium, you may need intravenous (IV) medicines. You also may need tests to check the electrical activity of your heart. Heart problems caused by low potassium levels can be very serious. Follow-up care is a key part of your treatment and safety. Be sure to make and go to all appointments, and call your doctor if you are having problems. It's also a good idea to know your test results and keep a list of the medicines you take. How can you care for yourself at home? · If your doctor recommends it, eat foods that have a lot of potassium. These include fresh fruits, juices, and vegetables. They also include nuts, beans, and milk. · Be safe with medicines. If your doctor prescribes medicines or potassium supplements, take them exactly as directed. Call your doctor if you have any problems with your medicines. · Get your potassium levels tested as often as your doctor tells you. When should you call for help? Call 911 anytime you think you may need emergency care. For example, call if:    · You feel like your heart is missing beats. Heart problems caused by low potassium can cause death.     · You passed out (lost consciousness).     · You have a seizure.    Call your doctor now or seek immediate medical care if:    · You feel weak or unusually tired.     · You have severe arm or leg cramps.     · You have tingling or numbness.     · You feel sick to your stomach, or you vomit.     · You have belly cramps.     · You feel bloated or constipated.     · You have to urinate a lot.     · You feel very thirsty most of the time.     · You are dizzy or lightheaded, or you feel like you may faint.     · You feel depressed, or you lose touch with reality. Watch closely for changes in your health, and be sure to contact your doctor if:    · You do not get better as expected. Where can you learn more? Go to http://www.gray.com/  Enter G358 in the search box to learn more about \"Hypokalemia: Care Instructions. \"  Current as of: March 31, 2020               Content Version: 12.6  © 0443-5986 [employer]. Care instructions adapted under license by [employer] (which disclaims liability or warranty for this information). If you have questions about a medical condition or this instruction, always ask your healthcare professional. [address] any warranty or liability for your use of this information.

## 2020-10-21 NOTE — ED PROVIDER NOTES
Patient is a 71-year-old male with a history of schizophrenia, hypertension presenting to the emergency department today with chief complaint shortness of breath for the past 2 days. Associated with cough. Denies any chest pain for me. Symptoms seem to worsen when he walks, improved with rest.  Patient received DuoNeb in route patient was wheezing extensively. He denies any history of COPD but does have a 2 pack-a-day smoking habit for \"a long time\". He has not tried anything at home. He endorses a subjective fever, he denies any exposure to COVID-19 that he is aware of. He denies any PE or DVT history, no lower extremity swelling, no long distance travel recently. Per EMS, patient's mother would like his psychiatric medications adjusted. When asked the patient about his schizophrenia and control he reports he is not had any recent psych symptoms that he feels like it is fairly well controlled but has been able to see a psychiatrist due to COVID-19 pandemic. Past Medical History:  
Diagnosis Date  Hypertension  Psychiatric diagnosis No past surgical history on file. No family history on file. Social History Socioeconomic History  Marital status: SINGLE Spouse name: Not on file  Number of children: Not on file  Years of education: Not on file  Highest education level: Not on file Occupational History  Not on file Social Needs  Financial resource strain: Not on file  Food insecurity Worry: Not on file Inability: Not on file  Transportation needs Medical: Not on file Non-medical: Not on file Tobacco Use  Smoking status: Not on file Substance and Sexual Activity  Alcohol use: Not on file  Drug use: Not on file  Sexual activity: Not on file Lifestyle  Physical activity Days per week: Not on file Minutes per session: Not on file  Stress: Not on file Relationships  Social connections Talks on phone: Not on file Gets together: Not on file Attends [employer] service: Not on file Active member of club or organization: Not on file Attends meetings of clubs or organizations: Not on file Relationship status: Not on file  Intimate partner violence Fear of current or ex partner: Not on file Emotionally abused: Not on file Physically abused: Not on file Forced sexual activity: Not on file Other Topics Concern  Not on file Social History Narrative  Not on file ALLERGIES: Patient has no allergy information on record. Review of Systems Constitutional: Negative for fever. HENT: Negative for congestion and rhinorrhea. Eyes: Negative for visual disturbance. Respiratory: Positive for cough, shortness of breath and wheezing. Cardiovascular: Negative for chest pain. Gastrointestinal: Negative for abdominal pain, diarrhea, nausea and vomiting. Endocrine: Negative for polyuria. Genitourinary: Negative for urgency. Musculoskeletal: Negative for myalgias. Skin: Negative for rash. Neurological: Negative for headaches. Psychiatric/Behavioral: Negative for decreased concentration, dysphoric mood, hallucinations and suicidal ideas. There were no vitals filed for this visit. Physical Exam 
Constitutional:   
   General: He is not in acute distress. Appearance: He is well-developed and normal weight. He is not ill-appearing or toxic-appearing. HENT:  
   Head: Normocephalic. Right Ear: External ear normal.  
   Left Ear: External ear normal.  
   Nose: Nose normal. No congestion or rhinorrhea. Mouth/Throat:  
   Mouth: Mucous membranes are moist.  
Eyes:  
   Pupils: Pupils are equal, round, and reactive to light. Neck: Musculoskeletal: Normal range of motion and neck supple. Cardiovascular:  
   Rate and Rhythm: Normal rate and regular rhythm. Pulses: Normal pulses. Heart sounds: Normal heart sounds. No murmur. Pulmonary:  
   Effort: Pulmonary effort is normal. Tachypnea present. No respiratory distress. Breath sounds: Examination of the right-middle field reveals decreased breath sounds and wheezing. Examination of the left-middle field reveals decreased breath sounds and wheezing. Examination of the right-lower field reveals decreased breath sounds and wheezing. Examination of the left-lower field reveals decreased breath sounds and wheezing. Decreased breath sounds and wheezing present. No rhonchi or rales. Abdominal:  
   General: Abdomen is flat. Palpations: Abdomen is soft. Tenderness: There is no abdominal tenderness. There is no guarding or rebound. Musculoskeletal: Normal range of motion. General: No swelling or tenderness. Right lower leg: He exhibits no tenderness. No edema. Left lower leg: He exhibits no tenderness. No edema. Skin: 
   General: Skin is warm and dry. Capillary Refill: Capillary refill takes less than 2 seconds. Findings: No rash. Neurological:  
   Mental Status: He is alert. EKG: October 21, 2020, 1255. Sinus rhythm, Triklo rate of 89 bpm, normal ID with a slightly prolonged QRS at 106 prolonged QTC of 552. No ST segment elevation or depression. T wave inversions noted in the anterolateral leads. I do not have a prior for comparison so I did discuss with the family medicine resident who can access to prior EKG indicates that patient does have some chronic T wave inversions seen prior. Recent Results (from the past 12 hour(s)) CBC WITH AUTOMATED DIFF Collection Time: 10/21/20 10:23 AM  
Result Value Ref Range WBC 6.0 4.6 - 13.2 K/uL  
 RBC 3.52 (L) 4.70 - 5.50 M/uL  
 HGB 11.0 (L) 13.0 - 16.0 g/dL HCT 31.1 (L) 36.0 - 48.0 % MCV 88.4 74.0 - 97.0 FL  
 MCH 31.3 24.0 - 34.0 PG  
 MCHC 35.4 31.0 - 37.0 g/dL  
 RDW 13.3 11.6 - 14.5 % PLATELET 087 049 - 593 K/uL MPV 9.1 (L) 9.2 - 11.8 FL  
 NEUTROPHILS 71 40 - 73 % LYMPHOCYTES 19 (L) 21 - 52 % MONOCYTES 9 3 - 10 % EOSINOPHILS 1 0 - 5 % BASOPHILS 0 0 - 2 %  
 ABS. NEUTROPHILS 4.3 1.8 - 8.0 K/UL  
 ABS. LYMPHOCYTES 1.2 0.9 - 3.6 K/UL  
 ABS. MONOCYTES 0.5 0.05 - 1.2 K/UL  
 ABS. EOSINOPHILS 0.0 0.0 - 0.4 K/UL  
 ABS. BASOPHILS 0.0 0.0 - 0.1 K/UL  
 DF AUTOMATED METABOLIC PANEL, COMPREHENSIVE Collection Time: 10/21/20 10:23 AM  
Result Value Ref Range Sodium 125 (L) 136 - 145 mmol/L Potassium 2.5 (LL) 3.5 - 5.5 mmol/L Chloride 88 (L) 100 - 111 mmol/L  
 CO2 31 21 - 32 mmol/L Anion gap 6 3.0 - 18 mmol/L Glucose 135 (H) 74 - 99 mg/dL BUN 6 (L) 7.0 - 18 MG/DL Creatinine 1.02 0.6 - 1.3 MG/DL  
 BUN/Creatinine ratio 6 (L) 12 - 20 GFR est AA >60 >60 ml/min/1.73m2 GFR est non-AA >60 >60 ml/min/1.73m2 Calcium 8.7 8.5 - 10.1 MG/DL Bilirubin, total 0.4 0.2 - 1.0 MG/DL  
 ALT (SGPT) 19 16 - 61 U/L  
 AST (SGOT) 19 10 - 38 U/L Alk. phosphatase 93 45 - 117 U/L Protein, total 8.0 6.4 - 8.2 g/dL Albumin 3.6 3.4 - 5.0 g/dL Globulin 4.4 (H) 2.0 - 4.0 g/dL A-G Ratio 0.8 0.8 - 1.7 NT-PRO BNP Collection Time: 10/21/20 10:23 AM  
Result Value Ref Range NT pro- 0 - 900 PG/ML  
TROPONIN I Collection Time: 10/21/20 10:23 AM  
Result Value Ref Range Troponin-I, QT <0.02 0.0 - 0.045 NG/ML  
MAGNESIUM Collection Time: 10/21/20 10:23 AM  
Result Value Ref Range Magnesium 1.7 1.6 - 2.6 mg/dL EKG, 12 LEAD, SUBSEQUENT Collection Time: 10/21/20  3:45 PM  
Result Value Ref Range Ventricular Rate 81 BPM  
 Atrial Rate 81 BPM  
 P-R Interval 120 ms QRS Duration 104 ms Q-T Interval 482 ms QTC Calculation (Bezet) 559 ms Calculated P Axis 42 degrees Calculated R Axis -51 degrees Calculated T Axis -41 degrees Diagnosis Normal sinus rhythm Possible Left atrial enlargement Left anterior fascicular block Left ventricular hypertrophy Cannot rule out Septal infarct , age undetermined T wave abnormality, consider inferior ischemia T wave abnormality, consider anterolateral ischemia Abnormal ECG No previous ECGs available XR CHEST PA LAT Final Result IMPRESSION:   
  
Diffuse bilateral mixed interstitial/alveolar opacities. Cardiogenic pulmonary  
edema suspected. Differential considerations include infectious/inflammatory process to include Covid related pneumonia. MDM Number of Diagnoses or Management Options Acute bronchitis, unspecified organism: Hypokalemia:  
Hyponatremia:  
Suspected COVID-19 virus infection:  
Diagnosis management comments: 71-year-old male presenting with shortness of breath. On exam he has extensive bilateral wheezing. He has no history of COPD. He does have an extensive smoking history so would not be at all surprised if he has underlying COPD. Plan at this point to obtain a chest x-ray, IV steroids, laboratory studies and DuoNeb treatments. Continue to follow patient closely. Differential includes ACS--doubt, CHF--doubt, new onset of COPD, versus chronic bronchitis or emphysema, versus acute bronchitis, less likely PE given patient's clinical exam and no major risk factors. Plan to advise the patient's mother that we are not able to adjust psychiatric medications today. He does take Haldol but he is not having any symptoms and does not require inpatient psych hospitalization at this point. Patient's labs are abnormal with a significant hypokalemia of 2.5, hyponatremia of 125. On my interpretation of the x-ray does have diffuse bilateral infiltrates, seems to be localized more in the right lower lung field. This could be consistent with Covid pneumonia. Patient's BNP is not suggestive of CHF onset or exacerbation. He has no peripheral edema.   Suspect this is likely infectious in nature either due to Covid or bacterial pneumonia. He is not demonstrating signs of sepsis at this time. I will give the patient antibiotics here as well as IV and oral replacement of his potassium as well as prescription for oral potassium replacement. He received 3 DuoNeb treatments and reports significant improvement of his symptoms. As below I did discuss the case with the [facility] resident regarding inpatient versus outpatient follow-up. They will get him in the clinic next week for follow-up. QTC is prolonged I gave him 2 g of magnesium, suspect this is likely secondary to his chronic Haldol use. Patient is not vomiting and I think outpatient oral replacement of the potassium will be appropriate. I do not suspect PE given his clinical lung exam.  I offer the patient admission he would prefer to go home. He denies any chest pain to suggest ACS. He does have T wave inversions EKG which although not able to see the EKG were reportedly present prior per the [facility] on-call. I do not suspect CHF exacerbation. He has improved greatly with IV fluids and bronchodilator treatment with steroids. No longer requiring any oxygen. I will give patient prescription for oral antibiotics as well as oral steroids and electrolyte replacement. I will give him an albuterol inhaler. He will follow closely in the clinic on Monday. Advised to return at once if his symptoms are worsening or changing in any way in the meantime.  
 
At this time, patient is stable and appropriate for discharge home.  Patient demonstrates understanding of current diagnoses and is in agreement with the treatment plan. Brianna Constant are advised that while the likelihood of serious underlying condition is low at this point given the evaluation performed today, we cannot fully rule it out. Brianna Constant are advised to immediately return with any new symptoms or worsening of current condition.  All questions have been answered. Malathi Frausto is given educational material regarding their diagnoses, including danger symptoms and when to return to the ED. CRITICAL CARE NOTE: 
 
I have spent 35 minutes of critical care time involved in lab review, consultations with specialist, family decision-making, and documentation.  During this entire length of time I was immediately available to the patient. 
[address], DO 
 
 
ED Course as of Oct 21 1705 Wed Oct 21, 2020  
1327 Case discussed with PFM resident. EKG does seem to have some T wave inversions in the anterolateral precordial leads. A were able to review an old EKG in 2018 and it appears that they were present at that time as well. Hopefully patient be given an outpatient appointment in the next couple of days. He is feeling better and would like to go home. [NGHIA] ED Course User Index [NGHIA] Munira Ocampo DO  
 
 
Procedures Diagnosis: 1. Suspected COVID-19 virus infection 2. Acute bronchitis, unspecified organism 3. Hypokalemia 4. Hyponatremia Disposition: Discharge Follow-up Information Follow up With Specialties Details Why Contact Info [facility] Medicine-[facility] Medicine  On 10/26/2020 2pm on Monday 10/26 with Dr. Alexsander Luu. [address] [facility] 
[address] [place] [address] 
[phone] Current Discharge Medication List  
  
START taking these medications Details  
potassium chloride SR (KLOR-CON 10) 10 mEq tablet Take 1 Tab by mouth two (2) times a day for 4 days. Qty: 8 Tab, Refills: 0  
  
magnesium oxide (MAG-OX) 400 mg tablet Take 1 Tab by mouth two (2) times a day for 3 days. Qty: 6 Tab, Refills: 0  
  
predniSONE (DELTASONE) 50 mg tablet Take 1 Tab by mouth daily for 3 days. Qty: 3 Tab, Refills: 0  
  
cefdinir (OMNICEF) 300 mg capsule Take 1 Cap by mouth two (2) times a day for 7 days. Qty: 14 Cap, Refills: 0  
  
doxycycline (VIBRA-TABS) 100 mg tablet Take 1 Tab by mouth two (2) times a day for 7 days. Qty: 14 Tab, Refills: 0  
  
albuterol sulfate (PROAIR RESPICLICK) 90 mcg/actuation breath activated inhaler Take 2 Puffs by inhalation every four (4) hours as needed for Wheezing, Shortness of Breath or Cough. Qty: 1 Inhaler, Refills: 0  
  
inhalational spacing device 1 Each by Does Not Apply route as needed for Wheezing. Qty: 1 Device, Refills: 0

## 2020-10-21 NOTE — ED TRIAGE NOTES
Per EMS, Patient c/o beng short of breath x2 days. x2 pack a day smoker. History of schizophrenia, wants his medications changed.

## 2020-10-23 NOTE — PROGRESS NOTES
Patient contacted regarding COVID-19 risk and screening. Discussed COVID-19 related testing which was available at this time. Test results were negative. Patient informed of results, if available? yes Care Transition Nurse/ Ambulatory Care Manager/ LPN Care Coordinator contacted the patient by telephone to perform follow-up assessment. Verified name and  with patient as identifiers. Patient has following risk factors of: no known risk factors. Symptoms reviewed with patient and his mother who verbalized the following symptoms: cough. Due to no new or worsening symptoms encounter was not routed to provider for escalation. patient has appointment with PCP on 10/26/20 Education provided regarding infection prevention, and signs and symptoms of COVID-19 and when to seek medical attention with patient and his mother who verbalized understanding. Discussed exposure protocols and quarantine from [address] you at higher risk for severe illness  and given an opportunity for questions and concerns. The patient agrees to contact the COVID-19 hotline [phone] or PCP office for questions related to their healthcare. CTN/ACM/LPN provided contact information for future reference. From CDC: Are you at higher risk for severe illness?  Wash your hands often.  Avoid close contact (6 feet, which is about two arm lengths) with people who are sick.  Put distance between yourself and other people if COVID-19 is spreading in your community.  Clean and disinfect frequently touched surfaces.  Avoid all cruise travel and non-essential air travel.  Call your healthcare professional if you have concerns about COVID-19 and your underlying condition or if you are sick. For more information on steps you can take to protect yourself, see CDC's How to Protect Yourself Plan for follow-up call in 7-14 days based on severity of symptoms and risk factors.

## 2020-10-25 NOTE — Clinical Note
TRANSFER - OUT REPORT:     Verbal report given to: Miguelina Sherman RN. Report consisted of patient's Situation, Background, Assessment and   Recommendations(SBAR). Opportunity for questions and clarification was provided. Patient transported with a Cardiac Cath Tech / Patient Care Tech. Patient transported to: [address]

## 2020-10-25 NOTE — Clinical Note
Contrast Dose Calculator:   Patient's age: 63.   Patient's sex: Male. Patient weight (kg) = 80. Creatinine level (mg/dL) = 1.12. Creatinine clearance (mL/min): 81.   Max Contrast dose per Creatinine Cl calculator = 182.25 mL.

## 2020-10-25 NOTE — Clinical Note
TRANSFER - IN REPORT:     Verbal report received from: Julian Hays RN. Report consisted of patient's Situation, Background, Assessment and   Recommendations(SBAR). Opportunity for questions and clarification was provided. Assessment completed upon patient's arrival to unit and care assumed. Patient transported with a Cardiac Cath Tech / Patient Care Tech.

## 2020-10-25 NOTE — Clinical Note
Right groin and right radial prepped with ChloraPrep and draped. Wet prep solution applied at: 1119. Wet prep solution dried at: 1122. Wet prep elapsed drying time: 3 mins.

## 2020-10-26 PROBLEM — R06.02 SHORTNESS OF BREATH: Status: ACTIVE | Noted: 2020-01-01

## 2020-10-26 PROBLEM — U07.1 PNEUMONIA DUE TO COVID-19 VIRUS: Status: ACTIVE | Noted: 2020-01-01

## 2020-10-26 PROBLEM — D64.9 ANEMIA: Status: ACTIVE | Noted: 2020-01-01

## 2020-10-26 PROBLEM — E87.1 HYPONATREMIA: Status: ACTIVE | Noted: 2020-01-01

## 2020-10-26 PROBLEM — K21.9 GASTROESOPHAGEAL REFLUX: Status: ACTIVE | Noted: 2020-01-01

## 2020-10-26 PROBLEM — I10 HYPERTENSION: Status: ACTIVE | Noted: 2020-01-01

## 2020-10-26 PROBLEM — J96.90 RESPIRATORY FAILURE (HCC): Status: ACTIVE | Noted: 2020-01-01

## 2020-10-26 PROBLEM — F20.9 SCHIZOPHRENIA (HCC): Status: ACTIVE | Noted: 2020-01-01

## 2020-10-26 PROBLEM — F17.200 NICOTINE DEPENDENCE: Status: ACTIVE | Noted: 2020-01-01

## 2020-10-26 PROBLEM — J12.82 PNEUMONIA DUE TO COVID-19 VIRUS: Status: ACTIVE | Noted: 2020-01-01

## 2020-10-26 NOTE — PROGRESS NOTES
Transfer acceptance [address] [place] [address] Patient: Josefina Farr MRN: [id number]  [facility]: [id number] YOB: 1960  Age: 63 y.o. Sex: male Admission Date: 10/25/2020 HPI:  
 
Josefina Farr is a 63 y.o. male with PMH COPD, Schizophrenia, HTN, h/o anemia, LVH, QT prolongation, now presenting with complaint of Shortness of breath, hypoxemia. Patient initially seen in ED 10/21 and diagnosed as having pneumonia. He was prescribed antibiotics at this time and discharged. patient filled the prescriptions, although it is somewhat unclear if he started to take them. His shortness of breath progressed until day of admission where he called EMS due to this shortness of breath. He was found to be hypoxemic and brought to the ED. In the ED he was Hypoxemic and placed on Oxygen and given duonebs. He was started on a broad workup, including imaging and labs. His imaging showed a somewhat mixed picture, with a CXR concerning for RLL pneumonia, and a CTA chest with ground-glass opacities in b/l UL, concerning for multifocal pneumonia, covid, or atypical edema. He was also coincidentally found to be hyponatremic, and with a BNP of >3000. With this mixed picture, he was started on broad-spectrum IV antibiotics, ID and pulm were consulted, and he was placed on precautions until a repeat COVID test resulted. He was initially admitted to the hospitalist service. During the first day of admission he was found to have actually been seeing PFM outpatient, and so a transfer to our team was arranged. ED Course (See objective for values/interpretations): 
Labs obtained: cbc, cmp, cardiac panel, blood culture, BNP, asm serum, Lactate, ABG, legionella, asm urine, troponin, Covid swab Review of Systems: 
Review of Systems Constitutional: Positive for diaphoresis. Negative for chills and fever. Respiratory: Positive for shortness of breath and wheezing. Negative for cough. Cardiovascular: Positive for orthopnea. Negative for chest pain, palpitations and leg swelling. Gastrointestinal: Positive for abdominal pain and heartburn. Negative for constipation, diarrhea, nausea and vomiting. Neurological: Negative for loss of consciousness and weakness. General ROS: negative for  - chills, fever, night sweats, weight gain and weight loss Psychological ROS: negative for - anxiety and depression Ophthalmic ROS: negative for - blurry vision, decreased vision or loss of vision ENT ROS: negative for - headaches, hearing change or visual changes Hematological and Lymphatic ROS: negative for - bruising, jaundice Respiratory ROS: negative for - cough, hemoptysis, shortness of breath, orthopnea, paroxysmal dyspnea, or wheezing Cardiovascular ROS: negative for - chest pain, dyspnea on exertion, edema, loss of consciousness, or palpitations Gastrointestinal ROS: negative for - abdominal pain, blood in stools, change in stools, constipation, diarrhea, hematemesis, melena, nausea/vomiting or swallowing difficulty/pain Genito-Urinary ROS: negative for - dysuria, hematuria or urinary frequency/urgency Musculoskeletal ROS: negative for - joint pain, joint swelling or muscle pain Neurological ROS: negative for - dizziness, headaches, numbness/tingling or weakness Dermatological ROS: negative for - rash or skin lesion changes Past Medical History:  
Diagnosis Date  Hypertension  Psychiatric diagnosis History reviewed. No pertinent surgical history. History reviewed. No pertinent family history. Social History Socioeconomic History  Marital status: SINGLE Spouse name: Not on file  Number of children: Not on file  Years of education: Not on file  Highest education level: Not on file No Known Allergies Prior to Admission Medications Prescriptions Last Dose Informant Patient Reported? Taking? albuterol sulfate (PROAIR RESPICLICK) 90 mcg/actuation breath activated inhaler   No No  
Sig: Take 2 Puffs by inhalation every four (4) hours as needed for Wheezing, Shortness of Breath or Cough. cefdinir (OMNICEF) 300 mg capsule   No No  
Sig: Take 1 Cap by mouth two (2) times a day for 7 days. doxycycline (VIBRA-TABS) 100 mg tablet   No No  
Sig: Take 1 Tab by mouth two (2) times a day for 7 days. inhalational spacing device   No No  
Si Each by Does Not Apply route as needed for Wheezing.  
magnesium oxide (MAG-OX) 400 mg tablet   No No  
Sig: Take 1 Tab by mouth two (2) times a day for 3 days. potassium chloride SR (KLOR-CON 10) 10 mEq tablet   No No  
Sig: Take 1 Tab by mouth two (2) times a day for 4 days. predniSONE (DELTASONE) 50 mg tablet   No No  
Sig: Take 1 Tab by mouth daily for 3 days. Facility-Administered Medications: None Physical Exam:  
 
Patient Vitals for the past 24 hrs: 
 Temp Pulse Resp BP SpO2  
10/26/20 1556 99.6 °F (37.6 °C) (!) 101 20 (!) 174/106 96 % 10/26/20 1015 98.5 °F (36.9 °C) 98 19 (!) 167/101 100 % 10/26/20 0812 98 °F (36.7 °C) 95 20 (!) 142/92 100 % 10/26/20 0745  94 25 (!) 142/92 99 % 10/26/20 0645  95 18  100 % 10/26/20 0630  100 25 (!) 140/95 99 % 10/26/20 0600  93 20  100 % 10/26/20 0545  87 20 (!) 147/95 100 % 10/26/20 0530  (!) 103 24 (!) 144/100 99 % 10/26/20 0515  98 19 (!) 144/101 99 % 10/26/20 0500  98 21 (!) 154/112 100 % 10/26/20 0445  (!) 104 22 (!) 141/100 100 % 10/26/20 0430  93 18 (!) 148/105 99 % 10/26/20 0415  (!) 108 28 (!) 145/110 99 % 10/26/20 0400  92 15 (!) 147/108 100 % 10/26/20 0345  90 18 (!) 134/100 99 % 10/26/20 0330  (!) 109 29 (!) 148/98 92 % 10/26/20 0315  99 18  95 % 10/26/20 0300  93 15  98 % 10/26/20 0245  (!) 105 20  98 % 10/26/20 0230  (!) 115 26  95 % 10/26/20 0215  93 18  99 % 10/26/20 0200  90 17  98 % 10/26/20 0145  89 15  100 % 10/26/20 0130  90 16  97 % 10/26/20 0100  98 21 (!) 155/104 97 % 10/26/20 0045  (!) 113 30  91 % 10/26/20 0030    (!) 148/100   
10/26/20 0015  89 16 (!) 146/99 94 % 10/25/20 2345  92 17 (!) 133/96 97 % 10/25/20 2330  96 17 (!) 142/99 97 % 10/25/20 2300  98 18 125/77 100 % 10/25/20 2245  (!) 105 24 (!) 136/92 99 % 10/25/20 2230  (!) 102 20 (!) 135/103 100 % 10/25/20 2215  88 15 (!) 159/105 100 % 10/25/20 2200  89 14 (!) 150/106 100 % 10/25/20 2145  90 15 (!) 151/105 100 % 10/25/20 2130  86 13 (!) 146/108 99 % 10/25/20 2115  (!) 102 23 (!) 143/102 100 % 10/25/20 2100  94 14 (!) 161/113 100 % 10/25/20 2045  90 15 (!) 158/109 100 % 10/25/20 2030 98.5 °F (36.9 °C) 97 18 (!) 164/107 97 % Physical Exam:  
Physical Exam 
Constitutional:   
   General: He is not in acute distress. Appearance: Normal appearance. He is not ill-appearing or diaphoretic. HENT:  
   Head: Normocephalic and atraumatic. Mouth/Throat:  
   Mouth: Mucous membranes are dry. Eyes:  
   Extraocular Movements: Extraocular movements intact. Pupils: Pupils are equal, round, and reactive to light. Neck: Musculoskeletal: Normal range of motion and neck supple. Cardiovascular:  
   Rate and Rhythm: Normal rate and regular rhythm. Pulses: Normal pulses. Heart sounds: Normal heart sounds. Pulmonary:  
   Effort: Pulmonary effort is normal. No respiratory distress. Breath sounds: Wheezing present. Abdominal:  
   General: Bowel sounds are normal. There is distension. Palpations: Abdomen is soft. Hernia: A hernia (easily reduced) is present. Musculoskeletal:     
   General: No swelling or tenderness. Skin: 
   General: Skin is warm and dry. Capillary Refill: Capillary refill takes less than 2 seconds. Neurological:  
   General: No focal deficit present. Mental Status: He is alert. Chemistry Recent Labs 10/26/20 [id number] 10/25/20 
2020 GLU  --  178* NA  --  120* K  --  4.5  
CL  --  81* CO2  --  30  
BUN  --  18  
CREA  --  1.29 CA  --  9.2 MG 1.8  --   
PHOS 5.3*  --   
AGAP  --  9  
BUCR  --  14  
AP  --  86  
TP  --  7.9 ALB  --  3.5 GLOB  --  4.4* AGRAT  --  0.8 CBC w/Diff Recent Labs 10/25/20 
2020 WBC 10.9 RBC 3.59* HGB 11.3* HCT 31.5*  GRANS 81* LYMPH 10* EOS 0 Liver Enzymes Protein, total  
Date Value Ref Range Status 10/25/2020 7.9 6.4 - 8.2 g/dL Final  
 
Albumin Date Value Ref Range Status 10/25/2020 3.5 3.4 - 5.0 g/dL Final  
 
Globulin Date Value Ref Range Status 10/25/2020 4.4 (H) 2.0 - 4.0 g/dL Final  
 
A-G Ratio Date Value Ref Range Status 10/25/2020 0.8 0.8 - 1.7   Final  
 
Alk. phosphatase Date Value Ref Range Status 10/25/2020 86 45 - 117 U/L Final  
 
Recent Labs 10/25/20 
2020 TP 7.9 ALB 3.5 GLOB 4.4* AGRAT 0.8 AP 86 Lactic Acid No results found for: LAC No results for input(s): LAC in the last 72 hours. BNP No results found for: BNP, BNPP, XBNPT Cardiac Enzymes Lab Results Component Value Date/Time  (H) 10/26/2020 11:36 AM  
  (H) 10/25/2020 08:20 PM  
 CKMB 15.7 (H) 10/26/2020 11:36 AM  
 CKMB 15.0 (H) 10/25/2020 08:20 PM  
 CKND1 4.0 10/26/2020 11:36 AM  
 CKND1 4.0 10/25/2020 08:20 PM  
 TROIQ 0.06 (H) 10/26/2020 11:36 AM  
 TROIQ 0.07 (H) 10/26/2020 02:45 AM  
 TROIQ 0.06 (H) 10/25/2020 08:20 PM  
  
 
ABG Recent Labs 10/26/20 
0000 PHI 7.42  
PCO2I 35.6 PO2I 55* HCO3I 23.2 FIO2I 24 Urinalysis Lab Results Component Value Date/Time  Color YELLOW 10/26/2020 10:00 AM  
 Appearance CLEAR 10/26/2020 10:00 AM  
 Specific gravity >1.030 (H) 10/26/2020 10:00 AM  
 pH (UA) 6.5 10/26/2020 10:00 AM  
 Protein TRACE (A) 10/26/2020 10:00 AM  
 Glucose Negative 10/26/2020 10:00 AM  
 Ketone Negative 10/26/2020 10:00 AM  
 Bilirubin Negative 10/26/2020 10:00 AM  
 Urobilinogen 0.2 10/26/2020 10:00 AM  
 Nitrites Negative 10/26/2020 10:00 AM  
 Leukocyte Esterase Negative 10/26/2020 10:00 AM  
 Epithelial cells FEW 10/26/2020 10:00 AM  
 Bacteria 1+ (A) 10/26/2020 10:00 AM  
 WBC 0 to 5 10/26/2020 10:00 AM  
 RBC NONE 10/26/2020 10:00 AM  
  
 
Micro Recent Labs 10/25/20 
2350 10/25/20 
2020 CULT NO GROWTH AFTER 9 HOURS NO GROWTH AFTER 13 HOURS Recent Labs 10/25/20 
2350 10/25/20 
2020 CULT NO GROWTH AFTER 9 HOURS NO GROWTH AFTER 13 HOURS Imaging: 
XR (Most Recent). Results from [facility] [place] Encounter encounter on 10/25/20 XR CHEST PORT Narrative Portable Frontal Chest. 
 
CLINICAL HISTORY: Shortness of breath worsening over the day in a smoker. Low 
oxygen saturations on room air. Delmis Messing TECHNIQUE: Single frontal view of the chest, obtained portably. COMPARISONS: 10/21/2020. FINDINGS:  
 
There are alveolar opacities right lung base with mild worsening since previous 
study. There are also underlying reticular densities towards both bases. No 
effusions. Cardiomediastinal silhouette unremarkable. Vascularity normal. 
 
 
  
 Impression IMPRESSION: 
 
Opacities right lower lobe mildly worsened concerning for pneumonia. Possible underlying fibrosis. Edema less likely without vascular congestion. CT (Most Recent) Results from [facility] Encounter encounter on 10/25/20 CTA CHEST W OR W WO CONT Narrative EXAM: CTA CHEST PULMONARY EMBOLISM CLINICAL INDICATION/HISTORY: 
 
COMPARISON: r/o PE . Shortness of breath and has progressively worsened over the 
past few days. TECHNIQUE: CTA of the chest was performed using timing optimized for pulmonary 
embolism technique, with 100 cc of Isovue-370 IV contrast.  To maximize 
sensitivity the sagittal and coronal reconstructions were created using a 3D 
multislice MIP (maximal intensity projection) methodology.  
CT scans at this facility are performed using dose optimization technique as 
 appropriate to the performed exam, to include automated exposure control, 
adjustment of the mA and/or kV according to patient size (including appropriate 
matching for site specific examinations), or use of iterative reconstruction 
technique. FINDINGS: 
Pulmonary arteries (includes assessment of MIP images): Study is limited by 
respiratory motion. Within this limitation, no filling defects are seen in the 
main, lobar, or visualized segmental arteries. Aorta and other cardiovascular structures: Study is not timed for the detection 
of aortic dissection. Ascending aorta measures up to 4.5 cm. Descending aorta 
measures up to 4.0 cm. Heart is enlarged. Heart Strain assessment: 
-  RV/LV ratio (normal <0.9): Normal 
-  Dysfunction or bowing of interventricular septum: None -  Main pulmonary artery enlargement (>30mm): 3.7 cm -  There is visualization of contrast reflux from the right heart into the 
IVC/hepatic veins. Lung/Airway:  Small right pleural effusion with mild overlying consolidation. Trace left pleural effusion. Patchy groundglass consolidations at the bilateral 
apices. Small amount of secretions in the trachea Lymph nodes:  No lymphadenopathy. Chest wall and lower neck: Negative. Upper abdominal structures: Bilateral renal cysts. Additional subcentimeter 
hypoattenuating lesions bilaterally are too small to characterize, but likely 
cysts. Cholelithiasis. Bones: Negative. Impression IMPRESSION: 
 
1. Study is limited by respiratory motion, but no definite pulmonary embolism. 2. Enlarged main pulmonary artery suggests pulmonary hypertension. 3. Small right and trace left pleural effusions. Mild overlying consolidation on 
the right may represent atelectasis or infiltrate. Patchy groundglass 
consolidations in the bilateral upper lobes may represent multifocal infection 
versus atypical edema. 4. Cholelithiasis. 5. Ascending aorta measures up to 4.5 cm. Descending aorta measures up to 4.0 
cm. Thank you for enabling us to participate in the care of this patient. ECHO No results found for this or any previous visit. EKG No results found for this or any previous visit. Recent Results (from the past 12 hour(s)) SARS-COV-2 Collection Time: 10/26/20  8:51 AM  
Result Value Ref Range SARS-CoV-2 PENDING Specimen source Nasopharyngeal    
 Specimen type NP Swab LEGIONELLA PNEUMOPHILA AG, URINE Collection Time: 10/26/20 10:00 AM  
 Specimen: Urine, random Result Value Ref Range Legionella Ag, urine Negative NEG STREP PNEUMO AG, URINE Collection Time: 10/26/20 10:00 AM  
 Specimen: Urine, random Result Value Ref Range Strep pneumo Ag, urine Negative NEG DRUG SCREEN, URINE Collection Time: 10/26/20 10:00 AM  
Result Value Ref Range BENZODIAZEPINES Negative NEG    
 BARBITURATES Negative NEG    
 THC (TH-CANNABINOL) Negative NEG    
 OPIATES Negative NEG    
 PCP(PHENCYCLIDINE) Negative NEG    
 COCAINE Negative NEG    
 AMPHETAMINES Negative NEG METHADONE Negative NEG HDSCOM (NOTE) URINALYSIS W/MICROSCOPIC Collection Time: 10/26/20 10:00 AM  
Result Value Ref Range Color YELLOW Appearance CLEAR Specific gravity >1.030 (H) 1.005 - 1.030  
 pH (UA) 6.5 5.0 - 8.0 Protein TRACE (A) NEG mg/dL Glucose Negative NEG mg/dL Ketone Negative NEG mg/dL Bilirubin Negative NEG Blood Negative NEG Urobilinogen 0.2 0.2 - 1.0 EU/dL Nitrites Negative NEG Leukocyte Esterase Negative NEG    
 WBC 0 to 5 0 - 4 /hpf  
 RBC NONE 0 - 5 /hpf Epithelial cells FEW 0 - 5 /lpf Bacteria 1+ (A) NEG /hpf CARDIAC PANEL,(CK, CKMB & TROPONIN) Collection Time: 10/26/20 11:36 AM  
Result Value Ref Range CK - MB 15.7 (H) <3.6 ng/ml CK-MB Index 4.0 0.0 - 4.0 %   (H) 39 - 308 U/L  
 Troponin-I, QT 0.06 (H) 0.0 - 0.045 NG/ML  
PHOSPHORUS Collection Time: 10/26/20 11:36 AM  
Result Value Ref Range Phosphorus 5.3 (H) 2.5 - 4.9 MG/DL MAGNESIUM Collection Time: 10/26/20 11:36 AM  
Result Value Ref Range Magnesium 1.8 1.6 - 2.6 mg/dL D DIMER Collection Time: 10/26/20 11:36 AM  
Result Value Ref Range D DIMER 0.36 <0.46 ug/ml(FEU) C REACTIVE PROTEIN, QT Collection Time: 10/26/20 11:36 AM  
Result Value Ref Range C-Reactive protein 0.8 (H) 0 - 0.3 mg/dL FERRITIN Collection Time: 10/26/20 11:36 AM  
Result Value Ref Range Ferritin 32 8 - 388 NG/ML  
PROCALCITONIN Collection Time: 10/26/20 11:36 AM  
Result Value Ref Range Procalcitonin <0.05 ng/mL Assessment/Plan:  
63 y.o. male with PMH COPD, Schizophrenia, HTN, h/o anemia, LVH, QT prolongation, now presenting with complaint of Shortness of breath, hypoxemia. Acute Hypoxic Respiratory Failure. DDx to include Covid-19 pneumonia, multifocal pneumonia, atypical pneumonia, acute or new chronic CHF exacerbation. Given the low CRP, low procal, and relatively normal labs, less likely to be multifocal or atypical pneumonia, or bacterial pneumonia. Cannot ruleout covid at this time, but prior negative puts it somewhat lower on the likelihood. High BNP gives more evidence to an acute hypoxic respiratory failure secondary to CHF, whether acute, or chronic and previously undiagnosed, and now decompensated. 
-accept to PFM service 
-telemetry 
-Pulm and ID consulted, their recs appreciated. -Continue Ceftriaxone and azithromycin for now 
-FU respiratory PCR/Covid test 
-ECHO 
-Decadron until COVID (-) test result 
-Duo-nebs scheduled 
-Trial of lasix, 20mg IV now 
-Strict I/Os Hyponatremia, Moderate, likely chronic. 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 
-Moderate for now, though on the threshold for severe 
-has received IVF at about half maintenance inpatient, will recheck BMP now -If improved, will DC fluids, if no improvement or worsening, will consult nephro for further recs. -Depending on etiology, can consider lasix, free water restriction, IVF, and other measures. -BMP daily QT prolongation 
-Daily EKG 
-Careful with QT prolonging meds Schizophrenia 
-Continue home haloperidol 10mg every day. HTN 
-Continue home medications Elevated troponins 
-Elevated troponins with EKG finding of ST elevation, not deemed at the time to be concerning,  
-likely secondary to demand ischemia 
-daily ekg already ordered. Diet DIET REGULAR  
DVT Prophylaxis Lovenox GI Prophylaxis Protonix Code status Full Disposition 2-3 MN Point of Contact N/a  
  
Gina Hickman MD , PGY-1  
[facility] [place] Medicine Intern Pager: [phone] October 26, 2020, 4:53 PM  
 
 
 
Transfer Note to [address] 
  
  
Patient: Amparo Abreu MRN: [id number]  CSN: [id number] YOB: 1960  Age: 63 y.o. Sex: male   
  
DOA: 10/25/2020 HPI:  
  
Amparo Abreu is a 63 y.o. male with PMH of HTN, anemia, LVH, QT prolongation, and Schizophrenia now admitted with SOB and hypoxia to 93%, thought to be 2/2 to community-acquired pneumonia. He is being transferred from the hospitalist service. Pt is currently being treated with ceftriaxone and azithromycin. ID and pulm are following. He is currently stating he is short of breath with wheezing and some central chest pain, not feeling any better from admission. He reports compliance with home medications, states he quit smoking 4-5 days ago when he began feeling ill with fevers and difficulty breathing. He denies alcohol and illicit drug use. He is not on home oxygen therapy.  
  
HPI, ROS, PMH, PSH, Family Hx, Social Hx, home medications, and allergies as above in intern H&P. I agree with history as above.  Additional comments to the subjective history include 
  
   
   
   
  
  
Physical Exam:  
  
 Patient Vitals for the past 24 hrs: 
  Temp Pulse Resp BP SpO2  
10/26/20 1556 99.6 °F (37.6 °C) (!) 101 20 (!) 174/106 96 % 10/26/20 1015 98.5 °F (36.9 °C) 98 19 (!) 167/101 100 % 10/26/20 0812 98 °F (36.7 °C) 95 20 (!) 142/92 100 % 10/26/20 0745  94 25 (!) 142/92 99 % 10/26/20 0645  95 18  100 % 10/26/20 0630  100 25 (!) 140/95 99 % 10/26/20 0600  93 20  100 % 10/26/20 0545  87 20 (!) 147/95 100 % 10/26/20 0530  (!) 103 24 (!) 144/100 99 % 10/26/20 0515  98 19 (!) 144/101 99 % 10/26/20 0500  98 21 (!) 154/112 100 % 10/26/20 0445  (!) 104 22 (!) 141/100 100 % 10/26/20 0430  93 18 (!) 148/105 99 % 10/26/20 0415  (!) 108 28 (!) 145/110 99 % 10/26/20 0400  92 15 (!) 147/108 100 % 10/26/20 0345  90 18 (!) 134/100 99 % 10/26/20 0330  (!) 109 29 (!) 148/98 92 % 10/26/20 0315  99 18  95 % 10/26/20 0300  93 15  98 % 10/26/20 0245  (!) 105 20  98 % 10/26/20 0230  (!) 115 26  95 % 10/26/20 0215  93 18  99 % 10/26/20 0200  90 17  98 % 10/26/20 0145  89 15  100 % 10/26/20 0130  90 16  97 % 10/26/20 0100  98 21 (!) 155/104 97 % 10/26/20 0045  (!) 113 30  91 % 10/26/20 0030    (!) 148/100   
10/26/20 0015  89 16 (!) 146/99 94 % 10/25/20 2345  92 17 (!) 133/96 97 % 10/25/20 2330  96 17 (!) 142/99 97 % 10/25/20 2300  98 18 125/77 100 % 10/25/20 2245  (!) 105 24 (!) 136/92 99 % 10/25/20 2230  (!) 102 20 (!) 135/103 100 % 10/25/20 2215  88 15 (!) 159/105 100 % 10/25/20 2200  89 14 (!) 150/106 100 % 10/25/20 2145  90 15 (!) 151/105 100 % 10/25/20 2130  86 13 (!) 146/108 99 % 10/25/20 2115  (!) 102 23 (!) 143/102 100 % 10/25/20 2100  94 14 (!) 161/113 100 % 10/25/20 2045  90 15 (!) 158/109 100 % 10/25/20 2030 98.5 °F (36.9 °C) 97 18 (!) 164/107 97 %   
  
Physical Exam:  
General:  Appears older than stated age. Alert and Responsive and in No acute distress. HEENT: Conjunctiva pink, sclera anicteric. Moist mucous membranes. No cervical, supraclavicular, occipital or submandibular lymphadenopathy. No other gross abnormalities present. CV:  RRR, no murmurs. No visible pulsations or thrills. RESP:  Unlabored breathing. Diffuse expiratory wheezing throughout. Equal expansion bilaterally. ABD:  Soft, nontender, nondistended. Reducible umbilical hernia is present. MS:  No joint deformity or instability. No atrophy. Neuro:  5/5 strength bilateral upper extremities and lower extremities. Answers questions appropriately. Ext:  No edema. 2+ radial and dp pulses bilaterally. Skin:  No visible rashes, lesions, or ulcers. Good turgor. 
  
IMAGING:  
Cta Chest W Or W Wo Cont 
  
Result Date: 10/26/2020 IMPRESSION: 1. Study is limited by respiratory motion, but no definite pulmonary embolism. 2. Enlarged main pulmonary artery suggests pulmonary hypertension. 3. Small right and trace left pleural effusions. Mild overlying consolidation on the right may represent atelectasis or infiltrate. Patchy groundglass consolidations in the bilateral upper lobes may represent multifocal infection versus atypical edema. 4. Cholelithiasis. 5. Ascending aorta measures up to 4.5 cm. Descending aorta measures up to 4.0 cm. Thank you for enabling us to participate in the care of this patient. 
  
Xr Chest Port 
  
Result Date: 10/25/2020 IMPRESSION: Opacities right lower lobe mildly worsened concerning for pneumonia. Possible underlying fibrosis. Edema less likely without vascular congestion. 
  
  
  
Recent Results Recent Results (from the past 12 hour(s)) SARS-COV-2  
  Collection Time: 10/26/20  8:51 AM  
Result Value Ref Range  
  SARS-CoV-2 PENDING    
  Specimen source Nasopharyngeal    
  Specimen type NP Swab LEGIONELLA PNEUMOPHILA AG, URINE  
  Collection Time: 10/26/20 10:00 AM  
  Specimen: Urine, random Result Value Ref Range   Legionella Ag, urine Negative NEG STREP PNEUMO AG, URINE  
  Collection Time: 10/26/20 10:00 AM  
  Specimen: Urine, random Result Value Ref Range  
  Strep pneumo Ag, urine Negative NEG DRUG SCREEN, URINE  
  Collection Time: 10/26/20 10:00 AM  
Result Value Ref Range  
  BENZODIAZEPINES Negative NEG    
  BARBITURATES Negative NEG    
  THC (TH-CANNABINOL) Negative NEG    
  OPIATES Negative NEG    
  PCP(PHENCYCLIDINE) Negative NEG    
  COCAINE Negative NEG    
  AMPHETAMINES Negative NEG    
  METHADONE Negative NEG    
  HDSCOM (NOTE)    
URINALYSIS W/MICROSCOPIC  
  Collection Time: 10/26/20 10:00 AM  
Result Value Ref Range  
  Color YELLOW    
  Appearance CLEAR    
  Specific gravity >1.030 (H) 1.005 - 1.030  
  pH (UA) 6.5 5.0 - 8.0    
  Protein TRACE (A) NEG mg/dL  
  Glucose Negative NEG mg/dL  
  Ketone Negative NEG mg/dL  
  Bilirubin Negative NEG    
  Blood Negative NEG    
  Urobilinogen 0.2 0.2 - 1.0 EU/dL  
  Nitrites Negative NEG    
  Leukocyte Esterase Negative NEG    
  WBC 0 to 5 0 - 4 /hpf  
  RBC NONE 0 - 5 /hpf  
  Epithelial cells FEW 0 - 5 /lpf  
  Bacteria 1+ (A) NEG /hpf CARDIAC PANEL,(CK, CKMB & TROPONIN)  
  Collection Time: 10/26/20 11:36 AM  
Result Value Ref Range  
  CK - MB 15.7 (H) <3.6 ng/ml  
  CK-MB Index 4.0 0.0 - 4.0 %  
   (H) 39 - 308 U/L  
  Troponin-I, QT 0.06 (H) 0.0 - 0.045 NG/ML  
PHOSPHORUS  
  Collection Time: 10/26/20 11:36 AM  
Result Value Ref Range  
  Phosphorus 5.3 (H) 2.5 - 4.9 MG/DL MAGNESIUM  
  Collection Time: 10/26/20 11:36 AM  
Result Value Ref Range  
  Magnesium 1.8 1.6 - 2.6 mg/dL D DIMER  
  Collection Time: 10/26/20 11:36 AM  
Result Value Ref Range  
  D DIMER 0.36 <0.46 ug/ml(FEU) C REACTIVE PROTEIN, QT  
  Collection Time: 10/26/20 11:36 AM  
Result Value Ref Range  
  C-Reactive protein 0.8 (H) 0 - 0.3 mg/dL FERRITIN  
  Collection Time: 10/26/20 11:36 AM  
Result Value Ref Range  
  Ferritin 32 8 - 388 NG/ML  
 PROCALCITONIN  
  Collection Time: 10/26/20 11:36 AM  
Result Value Ref Range  
  Procalcitonin <0.05 ng/mL  
  
 
  
  
Assessment/Plan:  
63 y.o. male with PMH of HTN, anemia, LVH, QT prolongation, and Schizophrenia , now admitted with SOB and hypoxia. 
  
1. Acute Hypoxic Respiratory Failure: DDx to include RLL pneumonia vs decompensated CHF vs COVID vs undiagnosed COPD. CTA chest was negative for PE, CXR and CTA did indicate RLL process, pleural effusion vs opacity. No vascular congestion indicated on final read, although it appears to be more congested compared to previous study four days prior. Pt's BNP increased from 401 on 10/21 to 3,340 on 10/25. He has received decadron, ceftriaxone, azithromycin and is currently on 4L of O2 via NC with sats at 96%. VSS. Strep pneumo ag negative. - admitted to 2S 
- telemetry - Vitals per unit routine - Continue scheduled ipratropium-albuterol 
- Continue ceftriaxone and azithromycin 
- dexamethasone 6mg IV BID 
- supplemental oxygen as needed - Lasix 20mg IV x1 to evaluate response - Strict I's and O's 
- Follow respiratory culture - F/U COVID test 
- F/U blood cultures - Consider repeat CXR 
- Appreciate pulm recs - Appreciate ID assistance with antibiotics 
  
2. Moderate Hyponatremia: No mental status changes. Na was 120 on 10/25 and 125 on 10/21. He has been on normal saline 75cc/hr since admitted. Per ID, could be psychogenic polydipsia. He could respond well to fluid restriction, increased dietary salt, or loop diuretics. - BMP now - Repeat CMP in the morning - Regular diet 
  
3. QT prolongation: Prolonged to 492 on admission, improved from 559 on 10/21 ER visit. - Daily EKGs 
- Caution with additional QT-prolonging medications.  
  
4. Schizophrenia: Pt reports compliance with Haldol. 
- Continue home Haldol 10mg po daily 
  
5: Troponemia: Trops on this admission were 0.06>0.07>0. 06. ST elevation was present in anterior leads on admission EKG. - Likely demand ischemia - Repeat EKG in the am 
  
  
Lex Redding D.O. PGY-3 
500 Aj Basurto DO 
10/26/20    4:51 PM

## 2020-10-26 NOTE — CONSULTS
Infectious Disease Consultation Note Reason: Bilateral pneumonia, suspected COVID-19 pneumonia Current abx Prior abx Ceftriaxone, azithromycin since 10/25 Levofloxacin on 10/25 Lines:  
 
 
Assessment : 
 
63 y.o. male who has a history of COPD, chronic smokersmokes 2 packs/day presents to the emergency room on 10/25/2020 secondary to shortness of breath, hypoxia. Clinical presentation consistent with acute hypoxic respiratory failure secondary to decompensated CHF, probable partially treated community-acquired pneumonia Low procalcitonin argues against typical bacterial pneumonia-recent outpatient antibiotics can mask the full clinical presentation. Hence will monitor clinically to determine this. Clinical presentation not very consistent with COVID-19 infection. Hyponatremia- ? Psychogenic polydipsia. Recommendations: 1. Continue ceftriaxone, azithromycin for now 2. Follow-up results of COVID-19 test, respiratory viral panel PCR. Send MRSA nares swab, sputum culture 3. Follow-up echo results, diuresis per pulmonary 4. Await nephrology recommendations for hyponatremia 5. Titrate oxygen as tolerated 6. Duration of antibiotics based on the above test results, clinical course Thank you for consultation request. Above plan was discussed in details with patient, RN. Please call me if any further questions or concerns. Will continue to participate in the care of this patient. HPI: 
 
63 y.o. male who has a history of COPD, chronic smokersmokes 2 packs/day presents to the emergency room on 10/25/2020 secondary to shortness of breath, hypoxia. Patient was recently seen in the ER on 10/21/2020 for shortness of breath and diagnosed to have pneumonia and discharged on p.o. Cefpodoxime, doxycycline. Patient filled the prescriptionsunclear if he had started taking these medications or not.   He presents to ED on 10/25/2020 with complaints of shortness of breath and was noted to be hypoxic. ER evaluationlabs show hyponatremia, chest x-ray shows opacities right lower lobe markedly worsened concerning for pneumonia. Patient underwent CTA chestno evidence of PE with patchy groundglass consolidations in the bilateral upper lobes may represent multifocal infection versus atypical edema. Patient has been started on broad-spectrum IV antibiotics - pulmonary consulted. I have been consulted for further recommendations. Patient states that he does not feel any better today compared to yesterday. Continues to have cough and shortness of breath. Denies any abdominal pain, diarrhea, chest pain. Past Medical History:  
Diagnosis Date  Hypertension  Psychiatric diagnosis History reviewed. No pertinent surgical history. home Medication List  
 Details  
cefdinir (OMNICEF) 300 mg capsule Take 1 Cap by mouth two (2) times a day for 7 days. Qty: 14 Cap, Refills: 0  
  
doxycycline (VIBRA-TABS) 100 mg tablet Take 1 Tab by mouth two (2) times a day for 7 days. Qty: 14 Tab, Refills: 0  
  
albuterol sulfate (PROAIR RESPICLICK) 90 mcg/actuation breath activated inhaler Take 2 Puffs by inhalation every four (4) hours as needed for Wheezing, Shortness of Breath or Cough. Qty: 1 Inhaler, Refills: 0  
  
inhalational spacing device 1 Each by Does Not Apply route as needed for Wheezing. Qty: 1 Device, Refills: 0 Current Facility-Administered Medications Medication Dose Route Frequency  cefTRIAXone (ROCEPHIN) 2 g in sterile water (preservative free) 20 mL IV syringe  2 g IntraVENous Q24H  
 acetaminophen (TYLENOL) tablet 650 mg  650 mg Oral Q6H PRN Or  
 acetaminophen (TYLENOL) suppository 650 mg  650 mg Rectal Q6H PRN  polyethylene glycol (MIRALAX) packet 17 g  17 g Oral DAILY PRN  
 ondansetron (ZOFRAN ODT) tablet 4 mg  4 mg Oral Q8H PRN  Or  
 ondansetron (ZOFRAN) injection 4 mg  4 mg IntraVENous Q6H PRN  
  enoxaparin (LOVENOX) injection 40 mg  40 mg SubCUTAneous DAILY  0.9% sodium chloride infusion  75 mL/hr IntraVENous CONTINUOUS  
 dexamethasone (DECADRON) 4 mg/mL injection 6 mg  6 mg IntraVENous Q12H  
 azithromycin (ZITHROMAX) 500 mg in  mL  500 mg IntraVENous Q24H  
 albuterol-ipratropium (DUO-NEB) 2.5 MG-0.5 MG/3 ML  3 mL Nebulization Q6H RT Allergies: Patient has no known allergies. History reviewed. No pertinent family history. Social History Socioeconomic History  Marital status: SINGLE Spouse name: Not on file  Number of children: Not on file  Years of education: Not on file  Highest education level: Not on file Occupational History  Not on file Social Needs  Financial resource strain: Not on file  Food insecurity Worry: Not on file Inability: Not on file  Transportation needs Medical: Not on file Non-medical: Not on file Tobacco Use  Smoking status: Not on file Substance and Sexual Activity  Alcohol use: Not on file  Drug use: Not on file  Sexual activity: Not on file Lifestyle  Physical activity Days per week: Not on file Minutes per session: Not on file  Stress: Not on file Relationships  Social connections Talks on phone: Not on file Gets together: Not on file Attends Uatsdin service: Not on file Active member of club or organization: Not on file Attends meetings of clubs or organizations: Not on file Relationship status: Not on file  Intimate partner violence Fear of current or ex partner: Not on file Emotionally abused: Not on file Physically abused: Not on file Forced sexual activity: Not on file Other Topics Concern  Not on file Social History Narrative  Not on file Social History Tobacco Use Smoking Status Not on file Temp (24hrs), Av.3 °F (36.8 °C), Min:98 °F (36.7 °C), Max:98.5 °F (36.9 °C) Visit Vitals BP (!) 167/101 (BP 1 Location: Left arm, BP Patient Position: At rest) Pulse 98 Temp 98.5 °F (36.9 °C) Resp 19 Ht 5' 9\" (1.753 m) Wt 88.6 kg (195 lb 6.4 oz) SpO2 100% BMI 28.86 kg/m² ROS: 12 point ROS obtained in details. Pertinent positives as mentioned in HPI,  
otherwise negative Physical Exam: 
 
 
General:  Alert, cooperative, no distress, appears stated age. Head:  Normocephalic, without obvious abnormality, atraumatic. Eyes:  Conjunctivae/corneas clear. PERRL, EOMs intact. Nose: Nares normal. Septum midline. Mucosa dry. No drainage or sinus tenderness. Throat: Lips, mucosa, and tongue normal. Teeth and gums normal.  
Neck: Supple, symmetrical, trachea midline,  + JVD. No bruit. Back:   Symmetric, no curvature. ROM normal.  
Lungs:    No resp distress, equal chest rise. Chest wall:  No tenderness or deformity. Heart:  Regular rate and rhythm, S1, S2 normal, no click, rub or gallop. Abdomen:   Tight and distended,  non-tender. Bowel sounds normal. Umbilical hernia present, small and reducible Extremities: Extremities normal, atraumatic,trace edema Pulses: 2+ and symmetric all extremities. Skin: Skin color, texture, turgor normal. No rashes or lesions. Normal cap refill. Lymph nodes: Cervical, supraclavicular, and axillary nodes normal.  
Neurologic: Grossly nonfocal  
  
 
 
Labs: Results:  
Chemistry Recent Labs 10/25/20 
2020 * *  
K 4.5  
CL 81* CO2 30 BUN 18 CREA 1.29  
CA 9.2 AGAP 9  
BUCR 14 AP 86  
TP 7.9 ALB 3.5 GLOB 4.4* AGRAT 0.8 CBC w/Diff Recent Labs 10/25/20 
2020 WBC 10.9 RBC 3.59* HGB 11.3* HCT 31.5*  GRANS 81* LYMPH 10* EOS 0 Microbiology Recent Labs 10/25/20 
2350 10/25/20 
2020 CULT NO GROWTH AFTER 5 HOURS NO GROWTH AFTER 9 HOURS  
  
 
 
RADIOLOGY: 
 
All available imaging studies/reports in [facility] for this admission were reviewed Dr. Lo Butcher, Infectious Disease Specialist 
[phone] October 26, 2020 
10:29 AM

## 2020-10-26 NOTE — H&P
HISTORY & PHYSICAL Patient: Josefina Farr MRN: [id number]  [facility]: [id number] YOB: 1960  Age: 63 y.o. Sex: male DOA: 10/25/2020 LOS:  LOS: 0 days DOA: 10/25/2020 Assessment/Plan Active Problems: 
  Pneumonia due to COVID-19 virus (10/26/2020) Plan: 1. Pneumoniacontinue IV antibiotics, ID consult, pulmonary consult. 2. Rule out COVID-19 infectionpatient was recently tested on 10/21/2020 and was noted to be negative. Continue droplet plus isolation. 3. Hyponatremiagentle hydration, monitor sodium levels, if no improvement will consult nephrology 4. History of COPD with hypoxia IV steroids, bronchodilators, oxygen support. 5. Chronic smokerpatient mentions he smokes 2 packs/day and has been doing so for many years, patient has been counseled to stop smoking. 6. Elevated troponindemand ischemia likely secondary to shortness of breath, continue to monitor serial cardiac enzymes, echocardiogram. 
DVT prophylaxisLovenox Full code HPI:  
 
Josefina Farr is a 63 y.o. male who has a history of COPD, chronic smokersmokes 2 packs/day presents to the emergency room secondary to shortness of breath and was noted to be hypoxic. Patient was recently seen in the ER for shortness of breath and diagnosed to have pneumonia and discharged on p.o. antibiotics. Patient filled the prescriptionsunclear if he had started taking these medications or not. He presents back today with complaints of shortness of breath and was noted to be hypoxic. ER evaluationlabs show hyponatremia, chest x-ray shows opacities right lower lobe markedly worsened concerning for pneumonia. Patient underwent CTA chestno evidence of PE with patchy groundglass consolidations in the bilateral upper lobes may represent multifocal infection versus atypical edema.  
Patient has been started on broad-spectrum IV antibiotics and ID and pulmonary consulted. Patient will be ruled out for COVID-19 infection and will be placed on droplet plus isolation. Patient will be admitted to the hospital for further evaluation and treatment. Past Medical History:  
Diagnosis Date  Hypertension  Psychiatric diagnosis History reviewed. No pertinent surgical history. History reviewed. No pertinent family history. Social History Socioeconomic History  Marital status: SINGLE Spouse name: Not on file  Number of children: Not on file  Years of education: Not on file  Highest education level: Not on file Prior to Admission medications Medication Sig Start Date End Date Taking? Authorizing Provider  
potassium chloride SR (KLOR-CON 10) 10 mEq tablet Take 1 Tab by mouth two (2) times a day for 4 days. 10/21/20 10/25/20  Christ Echavarria DO  
cefdinir (OMNICEF) 300 mg capsule Take 1 Cap by mouth two (2) times a day for 7 days. 10/21/20 10/28/20  Christ Echavarria DO  
doxycycline (VIBRA-TABS) 100 mg tablet Take 1 Tab by mouth two (2) times a day for 7 days. 10/21/20 10/28/20  Christ Echavarria DO  
albuterol sulfate (PROAIR RESPICLICK) 90 mcg/actuation breath activated inhaler Take 2 Puffs by inhalation every four (4) hours as needed for Wheezing, Shortness of Breath or Cough. 10/21/20   Christ Echavarria DO  
inhalational spacing device 1 Each by Does Not Apply route as needed for Wheezing. 10/21/20   Christ Echavarria DO No Known Allergies Review of Systems A comprehensive review of systems was negative except for that written in the History of Present Illness. Physical Exam:  
  
Visit Vitals BP (!) 155/104 Pulse 99 Temp 98.5 °F (36.9 °C) Resp 18 Ht 5' 9\" (1.753 m) Wt 88.6 kg (195 lb 6.4 oz) SpO2 95% BMI 28.86 kg/m² Physical Exam: 
 
Gen: In general, this is a well nourished male in no acute distress HEENT: Sclerae nonicteric. Oral mucous membranes moist. Dentition poor Neck: Supple with midline trachea. CV: RRR without murmur or rub appreciated. Resp:Respirations are unlabored without use of accessory muscles. Decreased breath sounds in bases Abd: Soft, nontender, nondistended. Extrem: Extremities are warm, without cyanosis or clubbing. No pitting pretibial edema. Skin: Warm, no visible rashes. Neuro: Patient is alert, oriented, and cooperative. No obvious focal defects. Moves all 4 extremities. Labs Reviewed: 
 
Recent Results (from the past 24 hour(s)) CBC WITH AUTOMATED DIFF Collection Time: 10/25/20  8:20 PM  
Result Value Ref Range WBC 10.9 4.6 - 13.2 K/uL  
 RBC 3.59 (L) 4.70 - 5.50 M/uL  
 HGB 11.3 (L) 13.0 - 16.0 g/dL HCT 31.5 (L) 36.0 - 48.0 % MCV 87.7 74.0 - 97.0 FL  
 MCH 31.5 24.0 - 34.0 PG  
 MCHC 35.9 31.0 - 37.0 g/dL  
 RDW 13.3 11.6 - 14.5 % PLATELET 880 903 - 318 K/uL MPV 9.7 9.2 - 11.8 FL  
 NEUTROPHILS 81 (H) 40 - 73 % LYMPHOCYTES 10 (L) 21 - 52 % MONOCYTES 9 3 - 10 % EOSINOPHILS 0 0 - 5 % BASOPHILS 0 0 - 2 %  
 ABS. NEUTROPHILS 8.8 (H) 1.8 - 8.0 K/UL  
 ABS. LYMPHOCYTES 1.0 0.9 - 3.6 K/UL  
 ABS. MONOCYTES 1.0 0.05 - 1.2 K/UL  
 ABS. EOSINOPHILS 0.0 0.0 - 0.4 K/UL  
 ABS. BASOPHILS 0.0 0.0 - 0.1 K/UL  
 DF AUTOMATED METABOLIC PANEL, COMPREHENSIVE Collection Time: 10/25/20  8:20 PM  
Result Value Ref Range Sodium 120 (L) 136 - 145 mmol/L Potassium 4.5 3.5 - 5.5 mmol/L Chloride 81 (L) 100 - 111 mmol/L  
 CO2 30 21 - 32 mmol/L Anion gap 9 3.0 - 18 mmol/L Glucose 178 (H) 74 - 99 mg/dL BUN 18 7.0 - 18 MG/DL Creatinine 1.29 0.6 - 1.3 MG/DL  
 BUN/Creatinine ratio 14 12 - 20 GFR est AA >60 >60 ml/min/1.73m2 GFR est non-AA 57 (L) >60 ml/min/1.73m2 Calcium 9.2 8.5 - 10.1 MG/DL Bilirubin, total 0.7 0.2 - 1.0 MG/DL  
 ALT (SGPT) 23 16 - 61 U/L  
 AST (SGOT) 17 10 - 38 U/L Alk. phosphatase 86 45 - 117 U/L Protein, total 7.9 6.4 - 8.2 g/dL Albumin 3.5 3.4 - 5.0 g/dL Globulin 4.4 (H) 2.0 - 4.0 g/dL A-G Ratio 0.8 0.8 - 1.7 CARDIAC PANEL,(CK, CKMB & TROPONIN) Collection Time: 10/25/20  8:20 PM  
Result Value Ref Range CK - MB 15.0 (H) <3.6 ng/ml CK-MB Index 4.0 0.0 - 4.0 %  (H) 39 - 308 U/L Troponin-I, QT 0.06 (H) 0.0 - 0.045 NG/ML  
NT-PRO BNP Collection Time: 10/25/20  8:20 PM  
Result Value Ref Range NT pro-BNP 3,340 (H) 0 - 900 PG/ML  
POC LACTIC ACID Collection Time: 10/25/20  8:43 PM  
Result Value Ref Range Lactic Acid (POC) 2.16 (HH) 0.40 - 2.00 mmol/L  
EKG, 12 LEAD, INITIAL Collection Time: 10/25/20  8:46 PM  
Result Value Ref Range Ventricular Rate 97 BPM  
 Atrial Rate 97 BPM  
 P-R Interval 150 ms QRS Duration 102 ms Q-T Interval 388 ms QTC Calculation (Bezet) 492 ms Calculated P Axis 46 degrees Calculated R Axis -48 degrees Calculated T Axis 86 degrees Diagnosis Normal sinus rhythm Possible Left atrial enlargement Left anterior fascicular block Left ventricular hypertrophy Septal infarct (cited on or before 21-OCT-2020) Abnormal ECG When compared with ECG of 21-OCT-2020 15:45, 
ST elevation now present in Anterior leads T wave inversion no longer evident in Inferior leads T wave inversion less evident in Anterolateral leads QT has shortened POC LACTIC ACID Collection Time: 10/25/20 11:53 PM  
Result Value Ref Range Lactic Acid (POC) 1.12 0.40 - 2.00 mmol/L  
POC G3 Collection Time: 10/26/20 12:00 AM  
Result Value Ref Range Device: NASAL CANNULA Flow rate (POC) 1 L/M  
 FIO2 (POC) 24 % pH (POC) 7.42 7.35 - 7.45    
 pCO2 (POC) 35.6 35.0 - 45.0 MMHG  
 pO2 (POC) 55 (L) 80 - 100 MMHG  
 HCO3 (POC) 23.2 22 - 26 MMOL/L  
 sO2 (POC) 89 (L) 92 - 97 % Base deficit (POC) 1 mmol/L Allens test (POC) YES Total resp. rate 16 Site LEFT RADIAL Patient temp. 98.6 Specimen type (POC) ARTERIAL Performed by Estevan Jerry TROPONIN I  
 Collection Time: 10/26/20  2:45 AM  
Result Value Ref Range Troponin-I, QT 0.07 (H) 0.0 - 0.045 NG/ML Imaging Reviewed: XR Results (most recent): 
Results from Hospital Encounter encounter on 10/25/20 XR CHEST PORT Narrative Portable Frontal Chest. 
 
CLINICAL HISTORY: Shortness of breath worsening over the day in a smoker. Low 
oxygen saturations on room air. Reggie Kirks TECHNIQUE: Single frontal view of the chest, obtained portably. COMPARISONS: 10/21/2020. FINDINGS:  
 
There are alveolar opacities right lung base with mild worsening since previous 
study. There are also underlying reticular densities towards both bases. No 
effusions. Cardiomediastinal silhouette unremarkable. Vascularity normal. 
 
 
  
 Impression IMPRESSION: 
 
Opacities right lower lobe mildly worsened concerning for pneumonia. Possible underlying fibrosis. Edema less likely without vascular congestion. CT Results (most recent): 
[place] [facility] [place] CTA CHEST W OR W WO CONT Narrative EXAM: CTA CHEST PULMONARY EMBOLISM CLINICAL INDICATION/HISTORY: 
 
COMPARISON: r/o PE . Shortness of breath and has progressively worsened over the 
past few days. TECHNIQUE: CTA of the chest was performed using timing optimized for pulmonary 
embolism technique, with 100 cc of Isovue-370 IV contrast.  To maximize 
sensitivity the sagittal and coronal reconstructions were created using a 3D 
multislice MIP (maximal intensity projection) methodology. CT scans at this facility are performed using dose optimization technique as 
appropriate to the performed exam, to include automated exposure control, 
adjustment of the mA and/or kV according to patient size (including appropriate 
matching for site specific examinations), or use of iterative reconstruction 
technique.  
 
 
FINDINGS: 
Pulmonary arteries (includes assessment of MIP images): Study is limited by 
 respiratory motion. Within this limitation, no filling defects are seen in the 
main, lobar, or visualized segmental arteries. Aorta and other cardiovascular structures: Study is not timed for the detection 
of aortic dissection. Ascending aorta measures up to 4.5 cm. Descending aorta 
measures up to 4.0 cm. Heart is enlarged. Heart Strain assessment: 
-  RV/LV ratio (normal <0.9): Normal 
-  Dysfunction or bowing of interventricular septum: None -  Main pulmonary artery enlargement (>30mm): 3.7 cm -  There is visualization of contrast reflux from the right heart into the 
IVC/hepatic veins. Lung/Airway:  Small right pleural effusion with mild overlying consolidation. Trace left pleural effusion. Patchy groundglass consolidations at the bilateral 
apices. Small amount of secretions in the trachea Lymph nodes:  No lymphadenopathy. Chest wall and lower neck: Negative. Upper abdominal structures: Bilateral renal cysts. Additional subcentimeter 
hypoattenuating lesions bilaterally are too small to characterize, but likely 
cysts. Cholelithiasis. Bones: Negative. Impression IMPRESSION: 
 
1. Study is limited by respiratory motion, but no definite pulmonary embolism. 2. Enlarged main pulmonary artery suggests pulmonary hypertension. 3. Small right and trace left pleural effusions. Mild overlying consolidation on 
the right may represent atelectasis or infiltrate. Patchy groundglass 
consolidations in the bilateral upper lobes may represent multifocal infection 
versus atypical edema. 4. Cholelithiasis. 5. Ascending aorta measures up to 4.5 cm. Descending aorta measures up to 4.0 
cm. Thank you for enabling us to participate in the care of this patient. Aundrea Silva MD 
10/26/2020, 3:24 AM 
 
 
 
Disclaimer: Sections of this note are dictated using utilizing voice recognition software. Minor typographical errors may be present.  If questions arise, please do not hesitate to contact me or call our department.

## 2020-10-26 NOTE — PROGRESS NOTES
10/26/20 1918 Vitals Temp 98.4 °F (36.9 °C) Temp Source Oral  
Pulse (Heart Rate) (!) 104 Resp Rate 22  
O2 Sat (%) 99 % Level of Consciousness Alert  
BP (!) 167/107 MAP (Calculated) 127 BP 1 Location Right arm BP 1 Method Automatic  
BP Patient Position At rest  
MEWS Score 3  
mews score of 3 due to elevated blood pressure. Fast respiratory rate and heart beat. Patient was medicated by AM  Nurse. RN informed.

## 2020-10-26 NOTE — PROGRESS NOTES
Patient was set up for PCP appointment upon last ER visit, with [address] for today, 10/26, missed visit due to being hospitalized. Called and rescheduled appointment. Confirmed Dr. Alejandro Jordan is patient's PCP and he has been seen there before. ISRAEL Velasquez Case Management [phone]

## 2020-10-26 NOTE — ED PROVIDER NOTES
EMERGENCY DEPARTMENT HISTORY AND PHYSICAL EXAM 
 
 
Date: 10/25/2020 Patient Name: Jacqueline Bull History of Presenting Illness Chief Complaint Patient presents with  Shortness of Breath History (Context): Jacqueline Bull is a 63 y.o. gentleman with hypertension and no other significant past medical conditions, he recently was diagnosed with pneumonia and suspected COVID-19 (COVID-19 swab negative), was started on antibiotics (third-generation cephalosporin and azithromycin), who presents with worsening, subacute, persistent, severe dyspnea associated with wheezing and cough generally unwell feeling. EMS was called to the scene for respiratory distress. The patient was very hypoxemic in the field. On review of systems, the patient denies chest pain, back pain, leg swelling, leg pain, recent travel, fever, chills, trauma, back pain, abdominal pain, nausea, vomiting, diaphoresis. PCP: Jackie Garcia MD 
 
Current Facility-Administered Medications Medication Dose Route Frequency Provider Last Rate Last Dose  cefTRIAXone (ROCEPHIN) 1 g in sterile water (preservative free) 10 mL IV syringe  1 g IntraVENous Q24H Deborah Duran MD   1 g at 10/25/20 2202  levoFLOXacin (LEVAQUIN) 500 mg in D5W IVPB  500 mg IntraVENous ONCE Deborah Duran  mL/hr at 10/26/20 0335 500 mg at 10/26/20 0335 Current Outpatient Medications Medication Sig Dispense Refill  cefdinir (OMNICEF) 300 mg capsule Take 1 Cap by mouth two (2) times a day for 7 days. 14 Cap 0  
 doxycycline (VIBRA-TABS) 100 mg tablet Take 1 Tab by mouth two (2) times a day for 7 days. 14 Tab 0  
 albuterol sulfate (PROAIR RESPICLICK) 90 mcg/actuation breath activated inhaler Take 2 Puffs by inhalation every four (4) hours as needed for Wheezing, Shortness of Breath or Cough. 1 Inhaler 0  
 inhalational spacing device 1 Each by Does Not Apply route as needed for Wheezing. 1 Device 0 Past History Past Medical History: 
Past Medical History:  
Diagnosis Date  Hypertension  Psychiatric diagnosis Past Surgical History: 
History reviewed. No pertinent surgical history. Family History: 
History reviewed. No pertinent family history. Social History: 
Social History Tobacco Use  Smoking status: Not on file Substance Use Topics  Alcohol use: Not on file  Drug use: Not on file Allergies: 
No Known Allergies PMH, PSH, family history, social history, allergies reviewed with the patient with significant items noted above. Review of Systems As per HPI, otherwise reviewed and negative. Physical Exam  
 
Vitals:  
 10/26/20 0230 10/26/20 0245 10/26/20 0300 10/26/20 0315 BP:      
Pulse: (!) 115 (!) 105 93 99 Resp: 26 20 15 18 Temp:      
SpO2: 95% 98% 98% 95% Weight:      
Height:      
 
 
Gen: In moderate respiratory distress HEENT: Normocephalic, sclera anicteric Cardiovascular: Normal rate, regular rhythm, no murmurs, rubs, gallops. Pulses intact and equal distally. Pulmonary: Moderate respiratory distress. Tachypneic. No stridor. Multifocal crackles, faint wheezes throughout ABD: Soft, nontender, nondistended. Neuro: Alert. Normal speech. Normal mentation. Psych: Normal thought content and thought processes. : No CVA tenderness EXT: No edema. Moves all extremities well. No cyanosis or clubbing. Skin: Warm and well-perfused. Diagnostic Study Results Labs - Recent Results (from the past 12 hour(s)) CBC WITH AUTOMATED DIFF Collection Time: 10/25/20  8:20 PM  
Result Value Ref Range WBC 10.9 4.6 - 13.2 K/uL  
 RBC 3.59 (L) 4.70 - 5.50 M/uL  
 HGB 11.3 (L) 13.0 - 16.0 g/dL HCT 31.5 (L) 36.0 - 48.0 % MCV 87.7 74.0 - 97.0 FL  
 MCH 31.5 24.0 - 34.0 PG  
 MCHC 35.9 31.0 - 37.0 g/dL  
 RDW 13.3 11.6 - 14.5 % PLATELET 957 420 - 565 K/uL MPV 9.7 9.2 - 11.8 FL  
 NEUTROPHILS 81 (H) 40 - 73 % LYMPHOCYTES 10 (L) 21 - 52 % MONOCYTES 9 3 - 10 % EOSINOPHILS 0 0 - 5 % BASOPHILS 0 0 - 2 %  
 ABS. NEUTROPHILS 8.8 (H) 1.8 - 8.0 K/UL  
 ABS. LYMPHOCYTES 1.0 0.9 - 3.6 K/UL  
 ABS. MONOCYTES 1.0 0.05 - 1.2 K/UL  
 ABS. EOSINOPHILS 0.0 0.0 - 0.4 K/UL  
 ABS. BASOPHILS 0.0 0.0 - 0.1 K/UL  
 DF AUTOMATED METABOLIC PANEL, COMPREHENSIVE Collection Time: 10/25/20  8:20 PM  
Result Value Ref Range Sodium 120 (L) 136 - 145 mmol/L Potassium 4.5 3.5 - 5.5 mmol/L Chloride 81 (L) 100 - 111 mmol/L  
 CO2 30 21 - 32 mmol/L Anion gap 9 3.0 - 18 mmol/L Glucose 178 (H) 74 - 99 mg/dL BUN 18 7.0 - 18 MG/DL Creatinine 1.29 0.6 - 1.3 MG/DL  
 BUN/Creatinine ratio 14 12 - 20 GFR est AA >60 >60 ml/min/1.73m2 GFR est non-AA 57 (L) >60 ml/min/1.73m2 Calcium 9.2 8.5 - 10.1 MG/DL Bilirubin, total 0.7 0.2 - 1.0 MG/DL  
 ALT (SGPT) 23 16 - 61 U/L  
 AST (SGOT) 17 10 - 38 U/L Alk. phosphatase 86 45 - 117 U/L Protein, total 7.9 6.4 - 8.2 g/dL Albumin 3.5 3.4 - 5.0 g/dL Globulin 4.4 (H) 2.0 - 4.0 g/dL A-G Ratio 0.8 0.8 - 1.7 CARDIAC PANEL,(CK, CKMB & TROPONIN) Collection Time: 10/25/20  8:20 PM  
Result Value Ref Range CK - MB 15.0 (H) <3.6 ng/ml CK-MB Index 4.0 0.0 - 4.0 %  (H) 39 - 308 U/L Troponin-I, QT 0.06 (H) 0.0 - 0.045 NG/ML  
NT-PRO BNP Collection Time: 10/25/20  8:20 PM  
Result Value Ref Range NT pro-BNP 3,340 (H) 0 - 900 PG/ML  
POC LACTIC ACID Collection Time: 10/25/20  8:43 PM  
Result Value Ref Range Lactic Acid (POC) 2.16 (HH) 0.40 - 2.00 mmol/L  
EKG, 12 LEAD, INITIAL Collection Time: 10/25/20  8:46 PM  
Result Value Ref Range Ventricular Rate 97 BPM  
 Atrial Rate 97 BPM  
 P-R Interval 150 ms QRS Duration 102 ms Q-T Interval 388 ms QTC Calculation (Bezet) 492 ms Calculated P Axis 46 degrees Calculated R Axis -48 degrees Calculated T Axis 86 degrees Diagnosis Normal sinus rhythm Possible Left atrial enlargement Left anterior fascicular block Left ventricular hypertrophy Septal infarct (cited on or before 21-OCT-2020) Abnormal ECG When compared with ECG of 21-OCT-2020 15:45, 
ST elevation now present in Anterior leads T wave inversion no longer evident in Inferior leads T wave inversion less evident in Anterolateral leads QT has shortened POC LACTIC ACID Collection Time: 10/25/20 11:53 PM  
Result Value Ref Range Lactic Acid (POC) 1.12 0.40 - 2.00 mmol/L  
POC G3 Collection Time: 10/26/20 12:00 AM  
Result Value Ref Range Device: NASAL CANNULA Flow rate (POC) 1 L/M  
 FIO2 (POC) 24 % pH (POC) 7.42 7.35 - 7.45    
 pCO2 (POC) 35.6 35.0 - 45.0 MMHG  
 pO2 (POC) 55 (L) 80 - 100 MMHG  
 HCO3 (POC) 23.2 22 - 26 MMOL/L  
 sO2 (POC) 89 (L) 92 - 97 % Base deficit (POC) 1 mmol/L Allens test (POC) YES Total resp. rate 16 Site LEFT RADIAL Patient temp. 98.6 Specimen type (POC) ARTERIAL Performed by Estevan Jerry TROPONIN I Collection Time: 10/26/20  2:45 AM  
Result Value Ref Range Troponin-I, QT 0.07 (H) 0.0 - 0.045 NG/ML Radiologic Studies -  
CTA CHEST W OR W WO CONT Final Result IMPRESSION:  
  
1. Study is limited by respiratory motion, but no definite pulmonary embolism. 2. Enlarged main pulmonary artery suggests pulmonary hypertension. 3. Small right and trace left pleural effusions. Mild overlying consolidation on  
the right may represent atelectasis or infiltrate. Patchy groundglass  
consolidations in the bilateral upper lobes may represent multifocal infection  
versus atypical edema. 4. Cholelithiasis. 5. Ascending aorta measures up to 4.5 cm. Descending aorta measures up to 4.0  
cm. Thank you for enabling us to participate in the care of this patient. XR CHEST PORT Final Result IMPRESSION:  
  
 Opacities right lower lobe mildly worsened concerning for pneumonia. Possible underlying fibrosis. Edema less likely without vascular congestion. CT Results  (Last 48 hours) 10/26/20 0124  CTA CHEST W OR W WO CONT Final result Impression:  IMPRESSION:  
   
1. Study is limited by respiratory motion, but no definite pulmonary embolism. 2. Enlarged main pulmonary artery suggests pulmonary hypertension. 3. Small right and trace left pleural effusions. Mild overlying consolidation on  
the right may represent atelectasis or infiltrate. Patchy groundglass  
consolidations in the bilateral upper lobes may represent multifocal infection  
versus atypical edema. 4. Cholelithiasis. 5. Ascending aorta measures up to 4.5 cm. Descending aorta measures up to 4.0  
cm. Thank you for enabling us to participate in the care of this patient. Narrative:  EXAM: CTA CHEST PULMONARY EMBOLISM CLINICAL INDICATION/HISTORY:  
   
COMPARISON: r/o PE . Shortness of breath and has progressively worsened over the  
past few days. TECHNIQUE: CTA of the chest was performed using timing optimized for pulmonary  
embolism technique, with 100 cc of Isovue-370 IV contrast.  To maximize  
sensitivity the sagittal and coronal reconstructions were created using a 3D  
multislice MIP (maximal intensity projection) methodology. CT scans at this facility are performed using dose optimization technique as  
appropriate to the performed exam, to include automated exposure control,  
adjustment of the mA and/or kV according to patient size (including appropriate  
matching for site specific examinations), or use of iterative reconstruction  
technique. FINDINGS:  
Pulmonary arteries (includes assessment of MIP images): Study is limited by  
respiratory motion.  Within this limitation, no filling defects are seen in the  
 main, lobar, or visualized segmental arteries. Aorta and other cardiovascular structures: Study is not timed for the detection  
of aortic dissection. Ascending aorta measures up to 4.5 cm. Descending aorta  
measures up to 4.0 cm. Heart is enlarged. Heart Strain assessment:  
-  RV/LV ratio (normal <0.9): Normal  
-  Dysfunction or bowing of interventricular septum: None -  Main pulmonary artery enlargement (>30mm): 3.7 cm -  There is visualization of contrast reflux from the right heart into the  
IVC/hepatic veins. Lung/Airway:  Small right pleural effusion with mild overlying consolidation. Trace left pleural effusion. Patchy groundglass consolidations at the bilateral  
apices. Small amount of secretions in the trachea Lymph nodes:  No lymphadenopathy. Chest wall and lower neck: Negative. Upper abdominal structures: Bilateral renal cysts. Additional subcentimeter  
hypoattenuating lesions bilaterally are too small to characterize, but likely  
cysts. Cholelithiasis. Bones: Negative. CXR Results  (Last 48 hours) 10/25/20 2124  XR CHEST PORT Final result Impression:  IMPRESSION:  
   
Opacities right lower lobe mildly worsened concerning for pneumonia. Possible underlying fibrosis. Edema less likely without vascular congestion. Narrative:  Portable Frontal Chest.  
   
CLINICAL HISTORY: Shortness of breath worsening over the day in a smoker. Low  
oxygen saturations on room air. Maikel Ra TECHNIQUE: Single frontal view of the chest, obtained portably. COMPARISONS: 10/21/2020. FINDINGS:   
   
There are alveolar opacities right lung base with mild worsening since previous  
study. There are also underlying reticular densities towards both bases. No  
effusions. Cardiomediastinal silhouette unremarkable. Vascularity normal.  
   
   
   
  
  
 
 
 
Medical Decision Making I am the first provider for this patient. I reviewed the vital signs, available nursing notes, past medical history, past surgical history, family history and social history. Vital Signs-Reviewed the patient's vital signs. EKG: Interpreted by myself. Records Reviewed: Personally, on initial evaluation MDM:  
Patient presents with dyspnea, which is associated with cough, dyspnea, hypoxemia. Exam significant for hypoxemia, multifocal crackles, multifocal wheezes. DDX considered: Pneumothorax, pneumonia, COPD , CHF, ACS, anxiety, PE, COVID-19 DDX thought to be less likely but also considered due to high risk condition: Anxiety, aortic dissection, PE, Boerhaave's, pericarditis, mediastinitis Patient condition on initial evaluation: Seriously ill Plan:  
Oxygen therapy per protocol Close Observation Cardiac monitoring As per orders noted below: 
Orders Placed This Encounter  CULTURE, BLOOD  CULTURE, BLOOD  LEGIONELLA PNEUMOPHILA AG, URINE  XR CHEST PORT  
 CTA CHEST W OR W WO CONT  CBC WITH AUTOMATED DIFF  
 METABOLIC PANEL, COMPREHENSIVE  
 CARDIAC PANEL,(CK, CKMB & TROPONIN)  NT-PRO BNP  
 OSMOLALITY, UR  
 OSMOLALITY, SERUM/PLASMA  BLOOD GAS, ARTERIAL  TROPONIN I  
 POC LACTIC ACID  DROPLET PLUS  
 POC LACTIC ACID  
 POC LACTIC ACID  
 POC G3  
 EKG, 12 LEAD, INITIAL  cefTRIAXone (ROCEPHIN) 1 g in sterile water (preservative free) 10 mL IV syringe  azithromycin (ZITHROMAX) 500 mg in  mL  albuterol-ipratropium (DUO-NEB) 2.5 MG-0.5 MG/3 ML  
 dexamethasone (DECADRON) 4 mg/mL Oral 10 mg  
 ondansetron (ZOFRAN) injection 4 mg  ondansetron (ZOFRAN) 4 mg/2 mL injection  levoFLOXacin (LEVAQUIN) 500 mg in D5W IVPB  iopamidoL (ISOVUE-370) 76 % injection  mL  INITIAL PHYSICIAN ORDER: INPATIENT Telemetry; 9. Other (further clarification in H&P documentation) ED Course: Patient labs demonstrated hyponatremia. Ordered serum osm and urine osm to differentiate. Chest radiograph demonstrates multifocal opacities concerning for atypical infection versus COVID-19. Patient was swabbed for COVID-19 and negative several days ago. Patient started on antibiotics and sepsis evaluation started. Code sepsis was called immediately upon presentation based on EMS vitals. Patient able to be titrated down to 2 L nasal cannula, but blood gas demonstrated hypoxemia on 1 L. Patient also developed nausea, abdominal pain, also consistent with Legionella. Given the morphology on the CT groundglass opacities, false negative COVID-19 is also possible. Patient's wheezing did improve with DuoNeb's. I have consulted the hospitalist for admission, who will admit. Patient condition at time of disposition: Seriously ill Diagnosis Clinical Impression: 1. Acute hypoxemic respiratory failure ([address] 2. Multifocal pneumonia 3. Suspected COVID-19 virus infection Signed, Svetlana Mcclure MD 
Emergency Physician 
[facility] As a voice dictation software was utilized to dictate this note, minor word transpositions can occur. I apologize for confusing wording and typographic errors. Please feel free to contact me for clarification.

## 2020-10-26 NOTE — ED TRIAGE NOTES
Patient presents to the ED for evaluation of shortness of breath that has progressively worsened over the day. Per medic, \"When we arrived on scene patient was 93% on room air. He did have some bilateral wheezing in all lung fields. We placed him on 3LNC and oxygen saturation increased to 98%. We also established an IV and administer albuterol and Atrovent breathing treatment. \"

## 2020-10-26 NOTE — CONSULTS
[facility] Pulmonary, Critical Care, and Sleep Medicine Name: Darcey Cogan MRN: [id number] : 1960 Hospital: [facility] Date: 10/26/2020 Critical Care Initial Patient Consult Requesting MD: Mary Mcduffie                                                  Reason for CC Consult: hypoxic resp failure IMPRESSION:  
· Hypoxic resp failure currently on 4 L NC, paO2 in 50's on ABG 
· B/L infiltrates- pneumonia vs atypical edema · Suspect diastolic heart failure with acute CHF exacerbation- elevated pro-BNP, b/l edema, pulm HTN seen on CT chest, increased abdominal swelling, JVD, LVH on EKG · Pulm HTN suspected per CT chest 
· COVID PUI  
· Nicotine dependence to cigarettes: 2 ppd smoker, 80 pack year history · Chronic hyponatremia- 120's, schizophrenia diagnosis per chart review, ?polydipsia vs ?cirrhosis · Elevated troponin RECOMMENDATIONS:  
· ECHO, trend cardiac panel · Likely would benefit from diuresis but will wait for ECHO · Recommend nephrology consult to help with sodium correction · Agree with abx therapy for now, added resp PCR, f/u COVID test 
· Decadron for now, d/c if COVID (-) · Procal, d dimer, ferritin added · Albuterol nebs added · Agree with AC- Lovenox · RUQ for abdominal swelling · Added thiamine, folic acid- high malnutrition risk with underlying psychiatric diagnosis · Full Code · Discussed w/ attending physician Subjective/History: This patient has been seen and evaluated at the request of Dr. Mary Mcduffie for hypox resp failure. Patient is a 63 y.o. male w/ PMH of schizophrenia, tob abuse, and HTN per chart presenting to [facility] for complaints of shortness of breath, hypoxia. He is a pleasant man but history is limited. Patient states his symptoms started 4-5 days ago. He present to ED on 10/21 w/ complaints of cough, SOB, subjective fever. COVID (-) at that time.  Today patient c/o chest tightness and SOB, especially with exertion. ROS (+) for loss of appetite, abdominal swelling, orthopnea. He is denying cough and fevers today. Denies HA, sore throat, anosmia, change in taste, n/v/d, abdominal pain, LE edema. States he does not take any medications at home aside from what was prescribed last ED visit. Chart review shows he may have psych meds but unable to see in chart. Denies ETOH abuse but appears tremulous. Smokes 2 PPD since 25 yo. Denies drug abuse. 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 Denies asbestos exposure. Past Medical History:  
Diagnosis Date  Hypertension  Psychiatric diagnosis History reviewed. No pertinent surgical history. Prior to Admission medications Medication Sig Start Date End Date Taking? Authorizing Provider  
potassium chloride SR (KLOR-CON 10) 10 mEq tablet Take 1 Tab by mouth two (2) times a day for 4 days. 10/21/20 10/25/20  Jim Muse, DO  
cefdinir (OMNICEF) 300 mg capsule Take 1 Cap by mouth two (2) times a day for 7 days. 10/21/20 10/28/20  Jim Muse, DO  
doxycycline (VIBRA-TABS) 100 mg tablet Take 1 Tab by mouth two (2) times a day for 7 days. 10/21/20 10/28/20  Jim Muse, DO  
albuterol sulfate (PROAIR RESPICLICK) 90 mcg/actuation breath activated inhaler Take 2 Puffs by inhalation every four (4) hours as needed for Wheezing, Shortness of Breath or Cough. 10/21/20   Jim Muse, DO  
inhalational spacing device 1 Each by Does Not Apply route as needed for Wheezing. 10/21/20   Jim Muse, DO  
 
Current Facility-Administered Medications Medication Dose Route Frequency  cefTRIAXone (ROCEPHIN) 2 g in sterile water (preservative free) 20 mL IV syringe  2 g IntraVENous Q24H  
 enoxaparin (LOVENOX) injection 40 mg  40 mg SubCUTAneous DAILY  0.9% sodium chloride infusion  75 mL/hr IntraVENous CONTINUOUS  
 dexamethasone (DECADRON) 4 mg/mL injection 6 mg  6 mg IntraVENous Q12H  azithromycin (ZITHROMAX) 500 mg in  mL  500 mg IntraVENous Q24H No Known Allergies Social History Tobacco Use  Smoking status: Not on file Substance Use Topics  Alcohol use: Not on file History reviewed. No pertinent family history. Review of Systems: A comprehensive review of systems was negative except for that written in the HPI. Objective:  
Vital Signs:   
Visit Vitals BP (!) 142/92 Pulse 94 Temp 98.5 °F (36.9 °C) Resp 25 Ht 5' 9\" (1.753 m) Wt 88.6 kg (195 lb 6.4 oz) SpO2 99% BMI 28.86 kg/m² Temp (24hrs), Av.5 °F (36.9 °C), Min:98.5 °F (36.9 °C), Max:98.5 °F (36.9 °C) Intake/Output:  
Last shift:      No intake/output data recorded. Last 3 shifts: 10/24 1901 - 10/26 0700 In: 100 [I.V.:100] Out: - Intake/Output Summary (Last 24 hours) at 10/26/2020 7015 Last data filed at 10/26/2020 6648 Gross per 24 hour Intake 100 ml Output  Net 100 ml Physical Exam: 
 
General:  Alert, cooperative, no distress, appears stated age. Head:  Normocephalic, without obvious abnormality, atraumatic. Eyes:  Conjunctivae/corneas clear. PERRL, EOMs intact. Nose: Nares normal. Septum midline. Mucosa dry. No drainage or sinus tenderness. Throat: Lips, mucosa, and tongue normal. Teeth and gums normal.  
Neck: Supple, symmetrical, trachea midline, no adenopathy, + JVD. No bruit. Back:   Symmetric, no curvature. ROM normal.  
Lungs:   Rales diffusely, distant lung sounds. No resp distress, equal chest rise. Chest wall:  No tenderness or deformity. Heart:  Regular rate and rhythm, S1, S2 normal, no murmur, click, rub or gallop. Abdomen:   Tight and distended,  non-tender. Bowel sounds normal. Umbilical hernia present, small and reducible Extremities: Extremities normal, atraumatic, no cyanosis or edema. Pulses: 2+ and symmetric all extremities. Skin: Skin color, texture, turgor normal. No rashes or lesions.  Normal cap refill. Lymph nodes: Cervical, supraclavicular, and axillary nodes normal.  
Neurologic: Grossly nonfocal  
 
 
Data:  
 
Recent Results (from the past 24 hour(s)) CBC WITH AUTOMATED DIFF Collection Time: 10/25/20  8:20 PM  
Result Value Ref Range WBC 10.9 4.6 - 13.2 K/uL  
 RBC 3.59 (L) 4.70 - 5.50 M/uL  
 HGB 11.3 (L) 13.0 - 16.0 g/dL HCT 31.5 (L) 36.0 - 48.0 % MCV 87.7 74.0 - 97.0 FL  
 MCH 31.5 24.0 - 34.0 PG  
 MCHC 35.9 31.0 - 37.0 g/dL  
 RDW 13.3 11.6 - 14.5 % PLATELET 390 870 - 101 K/uL MPV 9.7 9.2 - 11.8 FL  
 NEUTROPHILS 81 (H) 40 - 73 % LYMPHOCYTES 10 (L) 21 - 52 % MONOCYTES 9 3 - 10 % EOSINOPHILS 0 0 - 5 % BASOPHILS 0 0 - 2 %  
 ABS. NEUTROPHILS 8.8 (H) 1.8 - 8.0 K/UL  
 ABS. LYMPHOCYTES 1.0 0.9 - 3.6 K/UL  
 ABS. MONOCYTES 1.0 0.05 - 1.2 K/UL  
 ABS. EOSINOPHILS 0.0 0.0 - 0.4 K/UL  
 ABS. BASOPHILS 0.0 0.0 - 0.1 K/UL  
 DF AUTOMATED METABOLIC PANEL, COMPREHENSIVE Collection Time: 10/25/20  8:20 PM  
Result Value Ref Range Sodium 120 (L) 136 - 145 mmol/L Potassium 4.5 3.5 - 5.5 mmol/L Chloride 81 (L) 100 - 111 mmol/L  
 CO2 30 21 - 32 mmol/L Anion gap 9 3.0 - 18 mmol/L Glucose 178 (H) 74 - 99 mg/dL BUN 18 7.0 - 18 MG/DL Creatinine 1.29 0.6 - 1.3 MG/DL  
 BUN/Creatinine ratio 14 12 - 20 GFR est AA >60 >60 ml/min/1.73m2 GFR est non-AA 57 (L) >60 ml/min/1.73m2 Calcium 9.2 8.5 - 10.1 MG/DL Bilirubin, total 0.7 0.2 - 1.0 MG/DL  
 ALT (SGPT) 23 16 - 61 U/L  
 AST (SGOT) 17 10 - 38 U/L Alk. phosphatase 86 45 - 117 U/L Protein, total 7.9 6.4 - 8.2 g/dL Albumin 3.5 3.4 - 5.0 g/dL Globulin 4.4 (H) 2.0 - 4.0 g/dL A-G Ratio 0.8 0.8 - 1.7 CARDIAC PANEL,(CK, CKMB & TROPONIN) Collection Time: 10/25/20  8:20 PM  
Result Value Ref Range CK - MB 15.0 (H) <3.6 ng/ml CK-MB Index 4.0 0.0 - 4.0 %  (H) 39 - 308 U/L Troponin-I, QT 0.06 (H) 0.0 - 0.045 NG/ML  
CULTURE, BLOOD Collection Time: 10/25/20  8:20 PM  
 Specimen: Blood Result Value Ref Range Special Requests: NO SPECIAL REQUESTS Culture result: NO GROWTH AFTER 9 HOURS    
NT-PRO BNP Collection Time: 10/25/20  8:20 PM  
Result Value Ref Range NT pro-BNP 3,340 (H) 0 - 900 PG/ML  
POC LACTIC ACID Collection Time: 10/25/20  8:43 PM  
Result Value Ref Range Lactic Acid (POC) 2.16 (HH) 0.40 - 2.00 mmol/L  
EKG, 12 LEAD, INITIAL Collection Time: 10/25/20  8:46 PM  
Result Value Ref Range Ventricular Rate 97 BPM  
 Atrial Rate 97 BPM  
 P-R Interval 150 ms QRS Duration 102 ms Q-T Interval 388 ms QTC Calculation (Bezet) 492 ms Calculated P Axis 46 degrees Calculated R Axis -48 degrees Calculated T Axis 86 degrees Diagnosis Normal sinus rhythm Possible Left atrial enlargement Left anterior fascicular block Left ventricular hypertrophy Septal infarct (cited on or before 21-OCT-2020) Abnormal ECG When compared with ECG of 21-OCT-2020 15:45, 
ST elevation now present in Anterior leads T wave inversion no longer evident in Inferior leads T wave inversion less evident in Anterolateral leads QT has shortened Confirmed by Megan Norris ([id number]) on 10/26/2020 6:55:00 AM 
  
CULTURE, BLOOD Collection Time: 10/25/20 11:50 PM  
 Specimen: Blood Result Value Ref Range Special Requests: NO SPECIAL REQUESTS Culture result: NO GROWTH AFTER 5 HOURS    
POC LACTIC ACID Collection Time: 10/25/20 11:53 PM  
Result Value Ref Range Lactic Acid (POC) 1.12 0.40 - 2.00 mmol/L  
POC G3 Collection Time: 10/26/20 12:00 AM  
Result Value Ref Range Device: NASAL CANNULA Flow rate (POC) 1 L/M  
 FIO2 (POC) 24 % pH (POC) 7.42 7.35 - 7.45    
 pCO2 (POC) 35.6 35.0 - 45.0 MMHG  
 pO2 (POC) 55 (L) 80 - 100 MMHG  
 HCO3 (POC) 23.2 22 - 26 MMOL/L  
 sO2 (POC) 89 (L) 92 - 97 % Base deficit (POC) 1 mmol/L Allens test (POC) YES Total resp.  rate 16    
 Site LEFT RADIAL Patient temp. 98.6 Specimen type (POC) ARTERIAL Performed by Joseph Blake TROPONIN I Collection Time: 10/26/20  2:45 AM  
Result Value Ref Range Troponin-I, QT 0.07 (H) 0.0 - 0.045 NG/ML Telemetry:normal sinus rhythm Imaging: 
I have personally reviewed the patients radiographs and have reviewed the reports: CXR Results  (Last 48 hours) 10/25/20 2124  XR CHEST PORT Final result Impression:  IMPRESSION:  
   
Opacities right lower lobe mildly worsened concerning for pneumonia. Possible underlying fibrosis. Edema less likely without vascular congestion. Narrative:  Portable Frontal Chest.  
   
CLINICAL HISTORY: Shortness of breath worsening over the day in a smoker. Low  
oxygen saturations on room air. Derril [place] TECHNIQUE: Single frontal view of the chest, obtained portably. COMPARISONS: 10/21/2020. FINDINGS:   
   
There are alveolar opacities right lung base with mild worsening since previous  
study. There are also underlying reticular densities towards both bases. No  
effusions. Cardiomediastinal silhouette unremarkable. Vascularity normal.  
   
   
   
  
  
 
CT Results  (Last 48 hours) 10/26/20 0124  CTA CHEST W OR W WO CONT Final result Impression:  IMPRESSION:  
   
1. Study is limited by respiratory motion, but no definite pulmonary embolism. 2. Enlarged main pulmonary artery suggests pulmonary hypertension. 3. Small right and trace left pleural effusions. Mild overlying consolidation on  
the right may represent atelectasis or infiltrate. Patchy groundglass  
consolidations in the bilateral upper lobes may represent multifocal infection  
versus atypical edema. 4. Cholelithiasis. 5. Ascending aorta measures up to 4.5 cm. Descending aorta measures up to 4.0  
cm. Thank you for enabling us to participate in the care of this patient. Narrative:  EXAM: CTA CHEST PULMONARY EMBOLISM CLINICAL INDICATION/HISTORY:  
   
COMPARISON: r/o PE . Shortness of breath and has progressively worsened over the  
past few days. TECHNIQUE: CTA of the chest was performed using timing optimized for pulmonary  
embolism technique, with 100 cc of Isovue-370 IV contrast.  To maximize  
sensitivity the sagittal and coronal reconstructions were created using a 3D  
multislice MIP (maximal intensity projection) methodology. CT scans at this facility are performed using dose optimization technique as  
appropriate to the performed exam, to include automated exposure control,  
adjustment of the mA and/or kV according to patient size (including appropriate  
matching for site specific examinations), or use of iterative reconstruction  
technique. FINDINGS:  
Pulmonary arteries (includes assessment of MIP images): Study is limited by  
respiratory motion. Within this limitation, no filling defects are seen in the  
main, lobar, or visualized segmental arteries. Aorta and other cardiovascular structures: Study is not timed for the detection  
of aortic dissection. Ascending aorta measures up to 4.5 cm. Descending aorta  
measures up to 4.0 cm. Heart is enlarged. Heart Strain assessment:  
-  RV/LV ratio (normal <0.9): Normal  
-  Dysfunction or bowing of interventricular septum: None -  Main pulmonary artery enlargement (>30mm): 3.7 cm -  There is visualization of contrast reflux from the right heart into the  
IVC/hepatic veins. Lung/Airway:  Small right pleural effusion with mild overlying consolidation. Trace left pleural effusion. Patchy groundglass consolidations at the bilateral  
apices. Small amount of secretions in the trachea Lymph nodes:  No lymphadenopathy. Chest wall and lower neck: Negative. Upper abdominal structures: Bilateral renal cysts. Additional subcentimeter hypoattenuating lesions bilaterally are too small to characterize, but likely  
cysts. Cholelithiasis. Bones: Negative. J Carlos Bedoya PA-C 
10/26/20 Pulmonary, Critical Care Medicine [facility] Late entry for date of service 10/26/2020 I saw and evaluated the patient, performing the key elements of the service. I discussed the findings, assessment and plan with the PA and agree with the PA's findings and plan as documented in the PA's note. All edits and changes made above or are mentioned in my addendum. 60-year-old male with past medical history of schizophrenia, tobacco abuse, hypertension, presented to [facility] with complaints of shortness of breath. Of note, patient is a limited historian, unable to give full history although able to give answers to some questions. Patient found to be hypoxic on admission to the ER. Patient reported symptoms started a few days ago, with complaints of shortness of breath and cough with subjective fever. Patient was negative for Covid on 10/21 when coming to the ER then. Patient reported more chest tightness and shortness of breath with exertion, also noted loss of appetite and abdominal swelling as well as orthopnea. Patient denies headache, sore throat, Fantozzi Fariba, nausea, vomiting, LE swelling. Patient was also found to be hyponatremic. Patient known to be on psych medications. Patient is a 2 pack-a-day smoker since age 26, reports stopping smoking 2 days ago. Pulmonary recommendations include obtaining transthoracic echo, recommend nephrology consultation. Disagree with fluids for hyponatremia, advise fluid restriction and right upper quadrant ultrasound to rule out cirrhosis. Decompensated CHF highly likely given elevated NT proBNP of 3300, recommend cardiology consult as well.   Advised to keep patient net negative fluid balance. Will also advise treating patient for possible COVID-19 pneumonia, follow-up on RT-PCR and continue Decadron 6 mg daily until then, decrease dosing given no data for twice daily dosing and patient also diabetic. Can continue bronchodilators with Combivent Respimat, switch over to nebulizers once COVID-19 serology comes back negative. Given low probability of infection, advised against empiric Remdesevir. Continue antibiotics for community-acquired pneumonia, procalcitonin negative, if patient improves with fluid restriction and possible diuresis, then antibiotic and duration may be shortened given negative procalcitonin. Continue supportive care. Nicotine dependence to cigarettes:  I spent 3 minutes with patient regarding cessation of smoking cigarettes. I reviewed health risks of tobacco use including increased risk of MI, stroke, cancer, etc.  We reviewed various approaches to cessation including pills, patches, inhaler, gum, weaning self, \"cold turkey\", etc.  Pt declined cessation assistance at this time. Marco Eaton MD/MPH Pulmonary, Critical Care Medicine [facility]

## 2020-10-26 NOTE — ED NOTES
TRANSFER - OUT REPORT: 
 
Verbal report given to Charge Nurse on Tariq Jewell  being transferred to [address] [place] [address] for routine progression of care Report consisted of patients Situation, Background, Assessment and  
Recommendations(SBAR). Lines:  
Peripheral IV 10/25/20 Left Antecubital (Active) Site Assessment Clean, dry, & intact 10/25/20 2058 Phlebitis Assessment 0 10/25/20 2058 Infiltration Assessment 0 10/25/20 2058 Dressing Status Clean, dry, & intact; New 10/25/20 2058 Dressing Type Transparent 10/25/20 2058 Hub Color/Line Status Pink;Patent; Flushed 10/25/20 2058 Opportunity for questions and clarification was provided. Patient transported with: 
Transport Tech

## 2020-10-27 NOTE — PROGRESS NOTES
Consultation noted for hyponatremia, chart reviewed, orders placed, assessment and plan in brief below, will staff the patient in the morning. Patient is a 45-year-old -American male with history of COPD, chronic smoker who presented with shortness of breath and hypoxia. On presentation patient was hypertensive, hypoxic tachycardic but afebrile. Labs indicate normal white count mild anemia, normal platelets. BMP significant for hyponatremia with sodium of 125, BUN of 18 and creatinine of 1.29, albumin of 3.5, CO2 of 30. BNP was elevated at 3340. Chest x-ray showed right lower lobe opacities concerning for pneumonia, possible underlying fibrosis. No vascular congestion. CT chest was done with IV contrast which showed enlarged main pulmonary artery suggestive of pulmonary hypertension, small right and trace left pleural effusion. Patient had a urine osmolality done which was in the 500 range. Patient has been on 75 cc of normal saline for hydration, sodium has dipped from 1 25-1 20. Nephrology is for hyponatremia Assessment and plan #1 hypervolemic hypotonic hyponatremia, likely acute on chronic. Hyponatremia appears to be in the setting of volume overload, elevated BNP, CT indicative of effusions, pulmonary edema, diastolic CHF. Serum osmolarity is low indicative of hypotonic hyponatremia. -Urine osmolarity of 500 range indicates there is ADH action. Question is whether this is appropriate versus inappropriate ADH action. With history of schizophrenia, polydipsia as a etiology in question, but urine osmolarity of 500 range goes against diagnosis of polydipsia ( usually dilute urine). Repeating urine osmolarity and checking urine sodium would be helpful. Will discontinue patient's IV fluids and start him on fluid restriction of 1200 mL #2 mild YAIMA, etiology appears to be secondary to cardiorenal syndrome versus ischemic ATN.   Checking urine sodium and Qian will differentiate between prerenal state versus ATN versus cardiorenal syndrome. Patient received IV contrast, monitor for contrast-induced nephropathy. #3 acute hypoxic respiratory failure #4 CT with bilateral infiltrates pneumonia vers 
 atypical edema #5 suspect diastolic CHF #6 CT indicative of pulmonary hypertension #6 history of smoker #7 uncontrolled hypertension #8 diagnosis of schizophrenia, concern for question polydipsia #9 elevated troponins #10 normal liver appearance on RUQ US, LFT wnl Plan: #1 we will discontinue IV fluids, place fluid restriction #2 send urine sodium and urine osmolality #3 strict I's and O's to be recorded #4 check thyroid functions #5 Lasix 20 mg IV x1 
#6 follow pulmonary and ID recommendations #7 get echocardiogram 
#8 check sodium every 8 hours #9 follow ID and pulmonary recommendations #10 avoid NSAIDs and other nephrotoxins #11 increase metoprolol to 25 mg BID, add hydralazine 25 TID and  
       Hold lisinopril in setting of prachi Please call with questions, 
 
Miko Power MD FASN Cell [phone] Pager: [phone]

## 2020-10-27 NOTE — PROGRESS NOTES
Infectious Disease progress Note Reason: Bilateral pneumonia, suspected COVID-19 pneumonia Current abx Prior abx Ceftriaxone, azithromycin since 10/25 Levofloxacin on 10/25 Lines:  
 
 
Assessment : 
 
63 y.o. male who has a history of COPD, chronic smokersmokes 2 packs/day presents to the emergency room on 10/25/2020 secondary to shortness of breath, hypoxia. Clinical presentation consistent with acute hypoxic respiratory failure secondary to decompensated CHF, probable partially treated community-acquired pneumonia Low procalcitonin argues against typical bacterial pneumonia-recent outpatient antibiotics can mask the full clinical presentation. Hence will monitor clinically to determine this. Clinical presentation not very consistent with COVID-19 infection. Hyponatremia- ? Psychogenic polydipsia. Recommendations: 1.  continue ceftriaxone, azithromycin 2. Follow-up results of COVID-19 test 
3. Follow-up echo results, diuresis per pulmonary/cardiology 4. Await nephrology recommendations for hyponatremia 5. Titrate oxygen as tolerated 6. Duration of antibiotics based on the above test results, clinical course Above plan was discussed in details with patient, RN. Please call me if any further questions or concerns. Will continue to participate in the care of this patient. HPI: 
 
 
Patient states that he feels better today. Improved SOB. Denies any abdominal pain, diarrhea, chest pain. home Medication List  
 Details  
cefdinir (OMNICEF) 300 mg capsule Take 1 Cap by mouth two (2) times a day for 7 days. Qty: 14 Cap, Refills: 0  
  
doxycycline (VIBRA-TABS) 100 mg tablet Take 1 Tab by mouth two (2) times a day for 7 days. Qty: 14 Tab, Refills: 0  
  
albuterol sulfate (PROAIR RESPICLICK) 90 mcg/actuation breath activated inhaler Take 2 Puffs by inhalation every four (4) hours as needed for Wheezing, Shortness of Breath or Cough. Qty: 1 Inhaler, Refills: 0  
  
inhalational spacing device 1 Each by Does Not Apply route as needed for Wheezing. Qty: 1 Device, Refills: 0 Current Facility-Administered Medications Medication Dose Route Frequency  metoprolol tartrate (LOPRESSOR) tablet 25 mg  25 mg Oral Q12H  hydrALAZINE (APRESOLINE) tablet 25 mg  25 mg Oral TID  dexamethasone (DECADRON) 4 mg/mL injection 6 mg  6 mg IntraVENous Q24H  cefTRIAXone (ROCEPHIN) 2 g in sterile water (preservative free) 20 mL IV syringe  2 g IntraVENous Q24H  
 acetaminophen (TYLENOL) tablet 650 mg  650 mg Oral Q6H PRN Or  
 acetaminophen (TYLENOL) suppository 650 mg  650 mg Rectal Q6H PRN  polyethylene glycol (MIRALAX) packet 17 g  17 g Oral DAILY PRN  
 enoxaparin (LOVENOX) injection 40 mg  40 mg SubCUTAneous DAILY  azithromycin (ZITHROMAX) 500 mg in  mL  500 mg IntraVENous Q24H  
 thiamine (B-1) 200 mg in 0.9% sodium chloride 100 mL IVPB  200 mg IntraVENous DAILY  folic acid (FOLVITE) 1 mg in 0.9% sodium chloride 50 mL ivpb   IntraVENous DAILY  [Held by provider] lisinopriL (PRINIVIL, ZESTRIL) tablet 40 mg  40 mg Oral DAILY  hydrALAZINE (APRESOLINE) 20 mg/mL injection 10 mg  10 mg IntraVENous Q6H PRN  
 ipratropium-albuterol (COMBIVENT RESPIMAT) 20 mcg-100 mcg inhalation spray  2 Puff Inhalation Q6H RT  
 haloperidoL (HALDOL) tablet 10 mg  10 mg Oral DAILY  pantoprazole (PROTONIX) tablet 40 mg  40 mg Oral DAILY  trimethobenzamide (TIGAN) injection 200 mg  200 mg IntraMUSCular Q6H PRN Allergies: Patient has no known allergies. Temp (24hrs), Av.4 °F (36.9 °C), Min:97.3 °F (36.3 °C), Max:99.6 °F (37.6 °C) Visit Vitals BP (!) 140/85 (BP 1 Location: Left arm, BP Patient Position: At rest) Pulse 94 Temp 98.4 °F (36.9 °C) Resp 20 Ht 5' 9\" (1.753 m) Wt 88.6 kg (195 lb 6.4 oz) SpO2 99% BMI 28.86 kg/m² ROS: 12 point ROS obtained in details.  Pertinent positives as mentioned in HPI,  
 otherwise negative Physical Exam: 
 
 
General:  Alert, cooperative, no distress, appears stated age. Head:  Normocephalic, without obvious abnormality, atraumatic. Eyes:  Conjunctivae/corneas clear. Nose: Nares normal. Septum midline. Mucosa dry. No drainage or sinus tenderness. Throat: Lips, mucosa, and tongue normal. Teeth and gums normal.  
Neck: Supple, symmetrical, trachea midline Back:   Symmetric, no curvature. ROM normal.  
Lungs:    No resp distress, equal chest rise. Chest wall:  No tenderness or deformity. Heart:  Regular rate and rhythm, S1, S2 normal, no click, rub or gallop. Abdomen:   Tight and distended,  non-tender. Bowel sounds normal. Umbilical hernia present, small and reducible Extremities: Extremities normal, atraumatic,trace edema Skin: Skin color, texture, turgor normal. No rashes or lesions. Neurologic: Grossly nonfocal  
  
 
 
Labs: Results:  
Chemistry Recent Labs 10/26/20 
1136 10/25/20 
2020 * 178* * 120*  
K 5.5 4.5  
CL 83* 81* CO2 28 30 BUN 23* 18  
CREA 1.33* 1.29  
CA 9.2 9.2 AGAP 9 9 BUCR 17 14 AP  --  86  
TP  --  7.9 ALB  --  3.5 GLOB  --  4.4* AGRAT  --  0.8 CBC w/Diff Recent Labs 10/27/20 
0258 10/25/20 
2020 WBC 10.9 10.9 RBC 3.64* 3.59* HGB 11.3* 11.3* HCT 31.3* 31.5*  276 GRANS 88* 81* LYMPH 5* 10* EOS 0 0 Microbiology Recent Labs 10/25/20 
2350 10/25/20 
2020 CULT NO GROWTH 1 DAY NO GROWTH 2 DAYS  
  
 
 
RADIOLOGY: 
 
All available imaging studies/reports in [facility] for this admission were reviewed Dr. Sabra Cartagena, Infectious Disease Specialist 
[phone] October 27, 2020 
10:29 AM

## 2020-10-27 NOTE — PROGRESS NOTES
Reason for Admission:  Pneumonia due to COVID-19 virus [U07.1, J12.89] RUR Score:    22% Plan for utilizing home health: If needed will use [facility] home health. Likelihood of Readmission:   Moderate Do you (patient/family) have any concerns for transition/discharge?  no 
 
Transition of Care Plan:    
 
Initial assessment completed with patient. Cognitive status of patient: oriented to time, place, person and situation. Face sheet information confirmed:  yes. The patient designates mother to participate in his discharge plan and to receive any needed information. This patient lives in a single family home with mother. Patient is able to navigate steps as needed. Prior to hospitalization, patient was considered to be independent with ADLs/IADLS : yes . Patient has a current ACP document on file: no The mother will be available to transport patient home upon discharge. The patient already has cane medical equipment available in the home. Patient is not currently active with home health. .  Patient has not stayed in a skilled nursing facility or rehab. This patient is on dialysis :no 
 
 
List of available Home Health agencies were provided and reviewed with the patient prior to discharge. Freedom of choice signed: yes, for [facility]. Currently, the discharge plan is Home with [address] [place] [address] Bar[facility] Ol[facility]. The patient states that he can obtain his medications from the pharmacy, and take his medications as directed. Patient's current insurance is [employer] Care Management Interventions PCP Verified by CM: Yes 
Palliative Care Criteria Met (RRAT>21 & CHF Dx)?: No 
Mode of Transport at Discharge: Self Transition of Care Consult (CM Consult): Home Health [address]: Yes Marques Signup: No 
Discharge Durable Medical Equipment: No 
Physical Therapy Consult: Yes Occupational Therapy Consult:  Yes 
 Current Support Network: Relative's Home Confirm Follow Up Transport: Family The Patient and/or Patient Representative was Provided with a Choice of Provider and Agrees with the Discharge Plan?: Yes Freedom of Choice List was Provided with Basic Dialogue that Supports the Patient's Individualized Plan of Care/Goals, Treatment Preferences and Shares the Quality Data Associated with the Providers?: Yes Discharge Location Discharge Placement: Home with home health [facility] Case Management [phone]

## 2020-10-27 NOTE — PROGRESS NOTES
[facility] Progress Note Patient: Juliann Heimlich MRN: [id number] SSN: [id number]  YOB: 1960 Age: 63 y.o. Sex: male Admit Date: 10/25/2020 LOS: 1 day Chief Complaint Patient presents with  Shortness of Breath Subjective:  
 
Patient sleeping comfortably when seen this AM. Patient did well overnight, is currently on 4L NC. No major complaints at the moment. Patient has mild complaint of SOB, but was sleepy this AM, and did not seem to wish to speak at the time. Review of Systems Unable to perform ROS: Other  
refused. Objective:  
 
Visit Vitals BP (!) 140/85 (BP 1 Location: Left arm, BP Patient Position: At rest) Pulse 94 Temp 98.4 °F (36.9 °C) Resp 20 Ht 5' 9\" (1.753 m) Wt 88.6 kg (195 lb 6.4 oz) SpO2 99% BMI 28.86 kg/m² Physical Exam:  
Physical Exam  
Constitutional: He appears well-developed and well-nourished. HENT:  
Head: Normocephalic and atraumatic. Eyes: Pupils are equal, round, and reactive to light. Conjunctivae and EOM are normal.  
Neck: Normal range of motion. Neck supple. Cardiovascular: Normal rate, regular rhythm and normal heart sounds. Pulmonary/Chest: Effort normal. He has wheezes. Abdominal: Soft. Bowel sounds are normal.  
Skin: Skin is warm and dry. Intake and Output: 
Current Shift: No intake/output data recorded. Last three shifts: 10/25 1901 - 10/27 0700 In: 100 [I.V.:100] Out: 900 [Urine:900] Lab/Data Review: 
Recent Results (from the past 12 hour(s)) CBC WITH AUTOMATED DIFF Collection Time: 10/27/20  2:58 AM  
Result Value Ref Range WBC 10.9 4.6 - 13.2 K/uL  
 RBC 3.64 (L) 4.70 - 5.50 M/uL  
 HGB 11.3 (L) 13.0 - 16.0 g/dL HCT 31.3 (L) 36.0 - 48.0 % MCV 86.0 74.0 - 97.0 FL  
 MCH 31.0 24.0 - 34.0 PG  
 MCHC 36.1 31.0 - 37.0 g/dL  
 RDW 13.4 11.6 - 14.5 % PLATELET 372 806 - 280 K/uL MPV 10.6 9.2 - 11.8 FL  
 NEUTROPHILS 88 (H) 40 - 73 % LYMPHOCYTES 5 (L) 21 - 52 % MONOCYTES 7 3 - 10 % EOSINOPHILS 0 0 - 5 % BASOPHILS 0 0 - 2 %  
 ABS. NEUTROPHILS 9.5 (H) 1.8 - 8.0 K/UL  
 ABS. LYMPHOCYTES 0.6 (L) 0.9 - 3.6 K/UL  
 ABS. MONOCYTES 0.8 0.05 - 1.2 K/UL  
 ABS. EOSINOPHILS 0.0 0.0 - 0.4 K/UL  
 ABS. BASOPHILS 0.0 0.0 - 0.1 K/UL  
 DF AUTOMATED    
EKG, 12 LEAD, SUBSEQUENT Collection Time: 10/27/20  7:42 AM  
Result Value Ref Range Ventricular Rate 88 BPM  
 Atrial Rate 88 BPM  
 P-R Interval 144 ms QRS Duration 98 ms Q-T Interval 394 ms QTC Calculation (Bezet) 476 ms Calculated P Axis 46 degrees Calculated R Axis -45 degrees Calculated T Axis -84 degrees Diagnosis Normal sinus rhythm Possible Left atrial enlargement Left anterior fascicular block Left ventricular hypertrophy Cannot rule out Septal infarct (cited on or before 21-OCT-2020) Abnormal ECG When compared with ECG of 25-OCT-2020 20:46, 
Questionable change in initial forces of Septal leads Nonspecific T wave abnormality now evident in Inferior leads Telemetry Yes Oxygen NC Assessment and Plan:  
63 y.o. male with PMH COPD, Schizophrenia, HTN, h/o anemia, LVH, QT prolongation, now presenting with complaint of Shortness of breath, hypoxemia. 
  
Acute Hypoxic Respiratory Failure. DDx to include Covid-19 pneumonia, multifocal pneumonia, atypical pneumonia, acute or new chronic CHF exacerbation. Given the low CRP, low procal, and relatively normal labs, less likely to be multifocal or atypical pneumonia, or bacterial pneumonia. Cannot ruleout covid at this time, but prior negative puts it somewhat lower on the likelihood. High BNP gives more evidence to an acute hypoxic respiratory failure secondary to CHF, whether acute, or chronic and previously undiagnosed, and now decompensated. -Pulm and ID consulted, their recs appreciated. -Continue Ceftriaxone and azithromycin for now -ECHO 
-Decadron until COVID (-) test result -Duo-nebs scheduled 
-Trial of lasix, 20mg IV now 
-Strict I/Os 
  
Hyponatremia, Moderate, likely chronic. 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 
-Moderate for now, though on the threshold for severe -Nephro consulted, appreciate their recs 
-CMP, mag, phos daily 
-Sodium q8h 
-Free water restriction 1200ml 
  
QT prolongation 
-Daily EKG 
-Careful with QT prolonging meds 
  
Schizophrenia 
-Continue home haloperidol 10mg every day. 
  
HTN 
-hold home meds 
-Hydralazine and metoprolol 
  
Elevated troponins 
-Elevated troponins with EKG finding of ST elevation, not deemed at the time to be concerning,  
-likely secondary to demand ischemia 
-Cardiology consulted 
-daily ekg  
  
  
Diet DIET REGULAR  
DVT Prophylaxis Lovenox GI Prophylaxis Protonix Code status Full Disposition 2-3 MN  
  
Point of Contact Elver Self MD, PGY-1  
[facility] Medicine Intern Pager: [phone] October 27, 2020, 8:47 AM

## 2020-10-27 NOTE — CONSULTS
[facility] - Consult Note Consultation request by Dr. Pete Cantrell for advice/opinion related to evaluating shortness of breath Date of  Admission: 10/25/2020  8:23 PM  
Primary Care Physician:  Andry Quinonez MD 
 
 Assessment:  
 
-Presented due to shortness of breath, concern for CHF and PNA. BNP 3340 on presentation 10/25/2020. Chest CTA 10/26/2020 with small right and trace left pleural effusions, mild overlying consolidation on R may represent atelectasis or infiltrate; patchy ground-glass consolidations in bilateral upper lobes may represent multifocal infection vs. Atypical edema. 
-Hyponatremia, Na ~120 this admission, nephrology consulted. -Hx HTN 
-Hx tobacco abuse, reports plan to quit once discharged No primary cardiologist. 
 
 Plan:  
 
-Agree with trial of scheduled IV Lasix as ordered by nephrology team, appreciate assistance. 
-Lisinopril on hold, Hydralazine being increased per nephrology team. 
-Pending Echocardiogram as ordered by primary team, will review once complete. -ID following, appreciate assistance. -COVID test pending, remains on Droplet + precautions. I saw, examined, and evaluated the patient. I personally reviewed the patient's labs, tests, vitals, orders, medications, updated history, and other providers assessments. I personally agree with the findings as stated and the plan as documented. History of Present Illness: This is a 63 y.o. male admitted for Pneumonia due to COVID-19 virus [U07.1, J12.89]. Patient complains of:  Shortness of breath Amparo Abreu is a 63 y.o. male with PMHx as described above, who presented to the hospital due to shortness of breath x 4 days. Pt states that his shortness of breath awakened him from sleep 4 days ago. Endorses PND/orthopnea. He reports he gets sweaty when he is very short of breath. Denies chest pain. No cough or leg swelling. No fevers noted.   No prior cardiac history. History of HTN, takes medication but uncertain of name. Cardiac risk factors: male gender, hypertension Review of Symptoms:  Except as stated above include: 
Constitutional:  negative Respiratory:  negative Cardiovascular:  + PND/orthopnea, no chest pain or leg swelling Gastrointestinal: negative Genitourinary:  negative Musculoskeletal:  Negative Neurological:  Negative Dermatological:  Negative Endocrinological: Negative Psychological:  Negative Past Medical History:  
 
Past Medical History:  
Diagnosis Date  Hypertension  Psychiatric diagnosis Social History:  
 
Social History Socioeconomic History  Marital status: SINGLE Spouse name: Not on file  Number of children: Not on file  Years of education: Not on file  Highest education level: Not on file Family History:  
History reviewed. No pertinent family history. Medications:  
No Known Allergies Current Facility-Administered Medications Medication Dose Route Frequency  metoprolol tartrate (LOPRESSOR) tablet 25 mg  25 mg Oral Q12H  hydrALAZINE (APRESOLINE) tablet 25 mg  25 mg Oral TID  dexamethasone (DECADRON) 4 mg/mL injection 6 mg  6 mg IntraVENous Q24H  
 furosemide (LASIX) injection 20 mg  20 mg IntraVENous BID  cefTRIAXone (ROCEPHIN) 2 g in sterile water (preservative free) 20 mL IV syringe  2 g IntraVENous Q24H  
 acetaminophen (TYLENOL) tablet 650 mg  650 mg Oral Q6H PRN Or  
 acetaminophen (TYLENOL) suppository 650 mg  650 mg Rectal Q6H PRN  polyethylene glycol (MIRALAX) packet 17 g  17 g Oral DAILY PRN  
 enoxaparin (LOVENOX) injection 40 mg  40 mg SubCUTAneous DAILY  azithromycin (ZITHROMAX) 500 mg in  mL  500 mg IntraVENous Q24H  
 thiamine (B-1) 200 mg in 0.9% sodium chloride 100 mL IVPB  200 mg IntraVENous DAILY  folic acid (FOLVITE) 1 mg in 0.9% sodium chloride 50 mL ivpb   IntraVENous DAILY  [Held by provider] lisinopriL (PRINIVIL, ZESTRIL) tablet 40 mg  40 mg Oral DAILY  hydrALAZINE (APRESOLINE) 20 mg/mL injection 10 mg  10 mg IntraVENous Q6H PRN  
 ipratropium-albuterol (COMBIVENT RESPIMAT) 20 mcg-100 mcg inhalation spray  2 Puff Inhalation Q6H RT  
 haloperidoL (HALDOL) tablet 10 mg  10 mg Oral DAILY  pantoprazole (PROTONIX) tablet 40 mg  40 mg Oral DAILY  trimethobenzamide (TIGAN) injection 200 mg  200 mg IntraMUSCular Q6H PRN Physical Exam:  
 
Visit Vitals BP (!) 145/97 (BP 1 Location: Left arm, BP Patient Position: At rest) Pulse 76 Temp 99.1 °F (37.3 °C) Resp 20 Ht 5' 9\" (1.753 m) Wt 88.6 kg (195 lb 6.4 oz) SpO2 99% BMI 28.86 kg/m² BP Readings from Last 3 Encounters:  
10/27/20 (!) 145/97  
10/21/20 (!) 169/94 Pulse Readings from Last 3 Encounters:  
10/27/20 76  
10/21/20 86 Wt Readings from Last 3 Encounters:  
10/25/20 88.6 kg (195 lb 6.4 oz) General:  alert, cooperative, no distress, appears stated age Neck:  supple Lungs:  Non-labored respirations Heart:  Regular rate and rhythm Abdomen:  abdomen is soft without significant tenderness, masses, organomegaly or guarding Extremities:  Atraumatic, no edema Skin: Warm and dry. Neuro: alert, oriented x3, affect appropriate, no focal neurological deficits, moves all extremities well, no involuntary movements Psych: non focal 
 
 Data Review:  
 
Recent Labs 10/27/20 
0258 10/25/20 
2020 WBC 10.9 10.9 HGB 11.3* 11.3* HCT 31.3* 31.5*  276 Recent Labs 10/27/20 
1100 10/27/20 
1040 10/26/20 
1136 10/25/20 
2020 * 120*  121* 120* 120* K  --  4.9  4.9 5.5 4.5  
CL  --  86*  86* 83* 81* CO2  --  26  27 28 30 GLU  --  101*  104* 107* 178* BUN  --  25*  26* 23* 18  
CREA  --  1.18  1.24 1.33* 1.29 CA  --  8.4*  8.6 9.2 9.2 MG  --  1.9 1.8  --   
PHOS  --  4.7  4.7 5.3*  --   
ALB  --  3.1*  3.1*  --  3.5 ALT  --  25  --  23  
 
 
 Results for orders placed or performed during the hospital encounter of 10/25/20 EKG, 12 LEAD, INITIAL Result Value Ref Range Ventricular Rate 97 BPM  
 Atrial Rate 97 BPM  
 P-R Interval 150 ms QRS Duration 102 ms Q-T Interval 388 ms QTC Calculation (Bezet) 492 ms Calculated P Axis 46 degrees Calculated R Axis -48 degrees Calculated T Axis 86 degrees Diagnosis Normal sinus rhythm Possible Left atrial enlargement Left anterior fascicular block Left ventricular hypertrophy Septal infarct (cited on or before 21-OCT-2020) Abnormal ECG When compared with ECG of 21-OCT-2020 15:45, 
ST elevation now present in Anterior leads T wave inversion no longer evident in Inferior leads T wave inversion less evident in Anterolateral leads QT has shortened Confirmed by Víctor Le ([id number]) on 10/26/2020 6:55:00 AM 
  
 
 
All Cardiac Markers in the last 24 hours:  No results found for: CPK, CK, CKMMB, CKMB, RCK3, CKMBT, CKNDX, CKND1, MARLO, TROPT, TROIQ, TAMMY, TROPT, TNIPOC, BNP, BNPP Last Lipid:  No results found for: CHOL, CHOLX, CHLST, CHOLV, HDL, HDLP, LDL, LDLC, DLDLP, TGLX, TRIGL, TRIGP, CHHD, CHHDX Signed By: Penelope Hassan PA-C October 27, 2020

## 2020-10-27 NOTE — PROGRESS NOTES
conducted an initial consultation and Spiritual Assessment for Sury Manley, who is a 63 y. o.,male. Patient's Primary Language is: [place]. According to the patient's EMR Restoration Affiliation is: [place].  
 
The reason the Patient came to the hospital is:  
Patient Active Problem List  
 Diagnosis Date Noted  Pneumonia due to COVID-19 virus 10/26/2020  Hypertension 10/26/2020  Gastroesophageal reflux 10/26/2020  Anemia 10/26/2020  Hyponatremia 10/26/2020  
 Nicotine dependence 10/26/2020  Schizophrenia ([address] 10/26/2020  Shortness of breath 10/26/2020  Respiratory failure ([address] 10/26/2020 The  provided the following Interventions: 
Initiated a relationship of care and support. Listened empathically. Provided chaplaincy education. Provided information about Spiritual Care Services. Offered prayer and assurance of continued prayers on patient's behalf. Chart reviewed. The following outcomes where achieved: 
Patient expressed gratitude for 's visit. Assessment: 
Patient does not have any Mormonism/cultural needs that will affect patient's preferences in health care. Plan: 
Chaplains will continue to follow and will provide pastoral care on an as needed/requested basis. [address] Spiritual Care  
[phone]

## 2020-10-27 NOTE — ROUTINE PROCESS
Bedside shift change report given to TIN Self (oncoming nurse) by Betsy Rodriguez RN (offgoing nurse). Report included the following information SBAR, Kardex, Intake/Output and Recent Results.

## 2020-10-27 NOTE — CONSULTS
Consult Note Consult requested by: Aleksandar Miramontes MD 
 
ADMIT DATE: 10/25/2020 CONSULT DATE: October 27, 2020 Admission diagnosis: Respiratory failure ([address] Reason for Nephrology Consultation: hyponatremia 
  
Assessment and plan 
  
#1 hypervolemic hypotonic hyponatremia, likely acute on chronic. Hyponatremia appears to be in the setting of volume overload, elevated BNP, CT indicative of effusions, pulmonary edema, diastolic CHF. Serum osmolarity is low indicative of hypotonic hyponatremia. -Urine osmolarity of 500 range indicates there is ADH action. Question is whether this is appropriate versus inappropriate ADH action. With history of schizophrenia, polydipsia as a etiology in question, but urine osmolarity of 500 range goes against diagnosis of polydipsia ( usually dilute urine). Repeating urine osmolarity and checking urine sodium would be helpful. Will discontinue patient's IV fluids and start him on fluid restriction of 1200 mL #2 mild YAIMA, etiology appears to be secondary to cardiorenal syndrome versus ischemic ATN. Checking urine sodium and Qian will differentiate between prerenal state versus ATN versus cardiorenal syndrome. Patient received IV contrast, monitor for contrast-induced nephropathy. #3 acute hypoxic respiratory failure #4 CT with bilateral infiltrates pneumonia vers 
 atypical edema #5 suspect diastolic CHF #6 CT indicative of pulmonary hypertension #6 history of smoker #7 uncontrolled hypertension #8 diagnosis of schizophrenia, concern for question polydipsia #9 elevated troponins #10 normal liver appearance on RUQ US, LFT wnl  
  
Plan: 
  
#1 fluid restrict 1200 ml #2 follow urine sodium and urine osmolality #3 strict I's and O's to be recorded #4 follow thyroid functions #5 Lasix 20 mg IV BID today #6 follow pulmonary and ID recommendations #7 follow echocardiogram 
#8 check sodium every 8 hours #9 follow ID and pulmonary recommendations #10 avoid NSAIDs and other nephrotoxins #11 BP better , continue metoprolol to 25 mg BID, up titrate hydralazine to 50 TID and  
       Hold lisinopril in setting of prachi 
  
 
Please call with questions, 
  
MD DIANA Macedo Cell [phone] Pager: [phone] HPI:  
Patient is a 55-year-old -American male with history of COPD, chronic smoker who presented with shortness of breath and hypoxia. On presentation patient was hypertensive, hypoxic tachycardic but afebrile. Labs indicate normal white count mild anemia, normal platelets. BMP significant for hyponatremia with sodium of 125, BUN of 18 and creatinine of 1.29, albumin of 3.5, CO2 of 30. BNP was elevated at 3340. Chest x-ray showed right lower lobe opacities concerning for pneumonia, possible underlying fibrosis. No vascular congestion. CT chest was done with IV contrast which showed enlarged main pulmonary artery suggestive of pulmonary hypertension, small right and trace left pleural effusion. Patient had a urine osmolality done which was in the 500 range. Patient has been on 75 cc of normal saline for hydration, sodium has dipped from 1 25-1 20. Nephrology is for hyponatremia Past Medical History:  
Diagnosis Date  Hypertension  Psychiatric diagnosis History reviewed. No pertinent surgical history. Social History Socioeconomic History  Marital status: SINGLE Spouse name: Not on file  Number of children: Not on file  Years of education: Not on file  Highest education level: Not on file Occupational History  Not on file Social Needs  Financial resource strain: Not on file  Food insecurity Worry: Not on file Inability: Not on file  Transportation needs Medical: Not on file Non-medical: Not on file Tobacco Use  Smoking status: Not on file Substance and Sexual Activity  Alcohol use: Not on file  Drug use: Not on file  Sexual activity: Not on file Lifestyle  Physical activity Days per week: Not on file Minutes per session: Not on file  Stress: Not on file Relationships  Social connections Talks on phone: Not on file Gets together: Not on file Attends [employer] service: Not on file Active member of club or organization: Not on file Attends meetings of clubs or organizations: Not on file Relationship status: Not on file  Intimate partner violence Fear of current or ex partner: Not on file Emotionally abused: Not on file Physically abused: Not on file Forced sexual activity: Not on file Other Topics Concern  Not on file Social History Narrative  Not on file History reviewed. No pertinent family history. No Known Allergies Home Medications:  
 
Medications Prior to Admission Medication Sig  
 lisinopriL (PRINIVIL, ZESTRIL) 5 mg tablet Take  by mouth.  amLODIPine (NORVASC) 10 mg tablet 10 mg.  
 Vitamin C 500 mg tablet  ferrous sulfate (IRON) 325 mg (65 mg iron) EC tablet  haloperidoL (HALDOL) 10 mg tablet  omeprazole (PRILOSEC) 20 mg capsule  [] potassium chloride SR (KLOR-CON 10) 10 mEq tablet Take 1 Tab by mouth two (2) times a day for 4 days.  cefdinir (OMNICEF) 300 mg capsule Take 1 Cap by mouth two (2) times a day for 7 days.  doxycycline (VIBRA-TABS) 100 mg tablet Take 1 Tab by mouth two (2) times a day for 7 days.  albuterol sulfate (PROAIR RESPICLICK) 90 mcg/actuation breath activated inhaler Take 2 Puffs by inhalation every four (4) hours as needed for Wheezing, Shortness of Breath or Cough.  inhalational spacing device 1 Each by Does Not Apply route as needed for Wheezing. Current Inpatient Medications:  
 
Current Facility-Administered Medications Medication Dose Route Frequency  metoprolol tartrate (LOPRESSOR) tablet 25 mg  25 mg Oral Q12H  hydrALAZINE (APRESOLINE) tablet 25 mg  25 mg Oral TID  dexamethasone (DECADRON) 4 mg/mL injection 6 mg  6 mg IntraVENous Q24H  
 furosemide (LASIX) injection 20 mg  20 mg IntraVENous BID  cefTRIAXone (ROCEPHIN) 2 g in sterile water (preservative free) 20 mL IV syringe  2 g IntraVENous Q24H  
 acetaminophen (TYLENOL) tablet 650 mg  650 mg Oral Q6H PRN Or  
 acetaminophen (TYLENOL) suppository 650 mg  650 mg Rectal Q6H PRN  polyethylene glycol (MIRALAX) packet 17 g  17 g Oral DAILY PRN  
 enoxaparin (LOVENOX) injection 40 mg  40 mg SubCUTAneous DAILY  azithromycin (ZITHROMAX) 500 mg in  mL  500 mg IntraVENous Q24H  
 thiamine (B-1) 200 mg in 0.9% sodium chloride 100 mL IVPB  200 mg IntraVENous DAILY  folic acid (FOLVITE) 1 mg in 0.9% sodium chloride 50 mL ivpb   IntraVENous DAILY  [Held by provider] lisinopriL (PRINIVIL, ZESTRIL) tablet 40 mg  40 mg Oral DAILY  hydrALAZINE (APRESOLINE) 20 mg/mL injection 10 mg  10 mg IntraVENous Q6H PRN  
 ipratropium-albuterol (COMBIVENT RESPIMAT) 20 mcg-100 mcg inhalation spray  2 Puff Inhalation Q6H RT  
 haloperidoL (HALDOL) tablet 10 mg  10 mg Oral DAILY  pantoprazole (PROTONIX) tablet 40 mg  40 mg Oral DAILY  trimethobenzamide (TIGAN) injection 200 mg  200 mg IntraMUSCular Q6H PRN Review of Systems: No fever or chills. No sore throat. No cough or hemoptysis. No nausea, vomiting, abdominal pain, melena or hematochezia. No constipation or diarrhea. No dysuria, no gross hematuria of voiding difficulties. No ankle swelling, no joint paints. No muscle aches. No skin changes. No dizziness or lightheadedness. No headaches. Physical Assessment:  
 
Vitals:  
 10/27/20 0236 10/27/20 0405 10/27/20 0737 10/27/20 1114 BP:  (!) 154/95 (!) 140/85 (!) 145/97 Pulse:  100 94 76 Resp:  20 20 20 Temp:  97.3 °F (36.3 °C) 98.4 °F (36.9 °C) 99.1 °F (37.3 °C) SpO2: 95% 96% 99% 99% Weight:      
Height: Last 3 Recorded Weights in this Encounter 10/25/20 2030 Weight: 88.6 kg (195 lb 6.4 oz) Admission weight: Weight: 88.6 kg (195 lb 6.4 oz) (10/25/20 2030) Intake/Output Summary (Last 24 hours) at 10/27/2020 1155 Last data filed at 10/27/2020 1839 Gross per 24 hour Intake  Output 900 ml Net -900 ml Patient is in no apparent distress. HEENT: mmm Lungs: good air entry, clear to auscultation bilaterally. Cardiovascular system: S1, S2, regular rate and rhythm. Abdomen: soft, non tender, non distended. BS+ Extremities: no clubbing, cyanosis or edema. Neurologic: Alert, oriented X three. Data Review: 
 
Labs: Results:  
   
Chemistry Recent Labs 10/27/20 
1100 10/27/20 
1040 10/26/20 
1136  10/25/20 
2020 GLU  --  101*  104* 107*  --  178* * 120*  121* 120*  --  120* K  --  4.9  4.9 5.5  --  4.5  
CL  --  86*  86* 83*  --  81* CO2  --  26  27 28  --  30  
BUN  --  25*  26* 23*  --  18  
CREA  --  1.18  1.24 1.33*  --  1.29 CA  --  8.4*  8.6 9.2  --  9.2 AGAP  --  8  8 9  --  9  
BUCR  --  21*  21* 17  --  14  
AP  --  72  --   --  86  
TP  --  6.8  --   --  7.9 ALB  --  3.1*  3.1*  --   --  3.5 GLOB  --  3.7  --   --  4.4* AGRAT  --  0.8  --   --  0.8 PHOS  --  4.7  4.7 5.3*   < >  --   
 < > = values in this interval not displayed. CBC w/Diff Recent Labs 10/27/20 
0258 10/25/20 
2020 WBC 10.9 10.9 RBC 3.64* 3.59* HGB 11.3* 11.3* HCT 31.3* 31.5*  276 GRANS 88* 81* LYMPH 5* 10* EOS 0 0 Iron/Ferritin No results for input(s): IRON in the last 72 hours. No lab exists for component: TIBCCALC  
PTH/VIT D No results for input(s): PTH in the last 72 hours. No lab exists for component: VITD Drew Nagel MD 
10/27/2020 
11:55 AM 
 
 
October 27, 2020

## 2020-10-28 NOTE — PROGRESS NOTES
0730 
Bedside and Verbal shift change report received from [address] (offgoing nurse). Report included the following information SBAR, Kardex, MAR and Recent Results. [address] [facility] Bedside and Verbal shift change report given to Hal (oncoming nurse) by Abelardo Palma (offgoing nurse). Report included the following information SBAR, Kardex, MAR and Recent Results.

## 2020-10-28 NOTE — PROGRESS NOTES
Infectious Disease progress Note Reason: Bilateral pneumonia, suspected COVID-19 pneumonia Current abx Prior abx Ceftriaxone, azithromycin since 10/25 Levofloxacin on 10/25 Lines:  
 
 
Assessment : 
 
63 y.o. male who has a history of COPD, chronic smokersmokes 2 packs/day presents to the emergency room on 10/25/2020 secondary to shortness of breath, hypoxia. Clinical presentation consistent with acute hypoxic respiratory failure secondary to decompensated CHF Low procalcitonin argues against typical bacterial pneumonia-recent outpatient antibiotics can mask the full clinical presentation. Hence will monitor clinically to determine this. Clinical presentation not very consistent with COVID-19 infection. COVID-19 test 10/26 results pending Echocardiogram 10/27-results discussed with cardiologist.  Possible small mobile mass on anterior leaflet of mitral valve 5 x 5 mm concerning for possible vegetation. Clinical presentation is not classic for endocarditis. However prior outpatient antibiotics and current steroids would mask the full clinical presentation. I would discontinue antibiotics and monitor patient off antibiotics, and follow-up transesophageal echocardiogram results to definitively determine if patient has mitral valve endocarditis or not Acute on chronic Hyponatremia-nephrology follow-up appreciated Recommendations: 1. Discontinue ceftriaxone, d/c azithromycin -monitor off antibiotics 2. Follow-up results of COVID-19 test.  Discontinue Decadron if COVID-19 test negative 3.   diuresis per pulmonary/cardiology 4. Await nephrology recommendations for hyponatremia 5. Titrate oxygen as tolerated 6. Recommend transesophageal echocardiogram to evaluate mitral valve vegetation Above plan was discussed in details with patient, dr Nicholas Diego. Please call me if any further questions or concerns. Will continue to participate in the care of this patient.  
HPI: 
 
 Patient states that he feels better today. Improved SOB. Denies any abdominal pain, diarrhea, chest pain. home Medication List  
 Details  
cefdinir (OMNICEF) 300 mg capsule Take 1 Cap by mouth two (2) times a day for 7 days. Qty: 14 Cap, Refills: 0  
  
doxycycline (VIBRA-TABS) 100 mg tablet Take 1 Tab by mouth two (2) times a day for 7 days. Qty: 14 Tab, Refills: 0  
  
albuterol sulfate (PROAIR RESPICLICK) 90 mcg/actuation breath activated inhaler Take 2 Puffs by inhalation every four (4) hours as needed for Wheezing, Shortness of Breath or Cough. Qty: 1 Inhaler, Refills: 0  
  
inhalational spacing device 1 Each by Does Not Apply route as needed for Wheezing. Qty: 1 Device, Refills: 0 Current Facility-Administered Medications Medication Dose Route Frequency  ipratropium-albuterol (COMBIVENT RESPIMAT) 20 mcg-100 mcg inhalation spray  2 Puff Inhalation Q6H PRN  
 metoprolol tartrate (LOPRESSOR) tablet 25 mg  25 mg Oral Q12H  
 dexamethasone (DECADRON) 4 mg/mL injection 6 mg  6 mg IntraVENous Q24H  hydrALAZINE (APRESOLINE) tablet 50 mg  50 mg Oral TID  cefTRIAXone (ROCEPHIN) 2 g in sterile water (preservative free) 20 mL IV syringe  2 g IntraVENous Q24H  
 acetaminophen (TYLENOL) tablet 650 mg  650 mg Oral Q6H PRN Or  
 acetaminophen (TYLENOL) suppository 650 mg  650 mg Rectal Q6H PRN  polyethylene glycol (MIRALAX) packet 17 g  17 g Oral DAILY PRN  
 enoxaparin (LOVENOX) injection 40 mg  40 mg SubCUTAneous DAILY  azithromycin (ZITHROMAX) 500 mg in  mL  500 mg IntraVENous Q24H  
 thiamine (B-1) 200 mg in 0.9% sodium chloride 100 mL IVPB  200 mg IntraVENous DAILY  folic acid (FOLVITE) 1 mg in 0.9% sodium chloride 50 mL ivpb   IntraVENous DAILY  [Held by provider] lisinopriL (PRINIVIL, ZESTRIL) tablet 40 mg  40 mg Oral DAILY  hydrALAZINE (APRESOLINE) 20 mg/mL injection 10 mg  10 mg IntraVENous Q6H PRN  
  haloperidoL (HALDOL) tablet 10 mg  10 mg Oral DAILY  pantoprazole (PROTONIX) tablet 40 mg  40 mg Oral DAILY  trimethobenzamide (TIGAN) injection 200 mg  200 mg IntraMUSCular Q6H PRN Allergies: Patient has no known allergies. Temp (24hrs), Av.2 °F (37.3 °C), Min:98.3 °F (36.8 °C), Max:100.1 °F (37.8 °C) Visit Vitals BP (!) 159/72 (BP 1 Location: Left arm, BP Patient Position: At rest) Pulse 78 Temp 98.3 °F (36.8 °C) Resp 20 Ht 5' 9\" (1.753 m) Wt 88.5 kg (195 lb) SpO2 99% BMI 28.80 kg/m² ROS: 12 point ROS obtained in details. Pertinent positives as mentioned in HPI,  
otherwise negative Physical Exam: 
 
 
General:  Alert, cooperative, no distress, appears stated age. Head:  Normocephalic, without obvious abnormality, atraumatic. Eyes:  Conjunctivae/corneas clear. Nose: Nares normal. Septum midline. Mucosa dry. No drainage or sinus tenderness. Throat: Lips, mucosa, and tongue normal. Teeth and gums normal.  
Neck: Supple, symmetrical, trachea midline Back:   Symmetric, no curvature. ROM normal.  
Lungs:    No resp distress, equal chest rise. Chest wall:  No tenderness or deformity. Heart:  Regular rate and rhythm, S1, S2 normal, no click, rub or gallop. Abdomen:   Soft,  non-tender. Bowel sounds normal. Umbilical hernia present, small and reducible Extremities: Extremities normal, atraumatic,no  edema Skin: Skin color, texture, turgor normal. No rashes or lesions. Neurologic: Grossly nonfocal  
  
 
 
Labs: Results:  
Chemistry Recent Labs 10/28/20 
0450 10/27/20 
1920 10/27/20 
1100 10/27/20 
1040 10/26/20 
1136 10/25/20 
2020 *  --   --  101*  104* 107* 178* * 122* 121* 120*  121* 120* 120*  
K 4.6  --   --  4.9  4.9 5.5 4.5  
CL 87*  --   --  86*  86* 83* 81* CO2 26  --   --  26  27 28 30 BUN 25*  --   --  25*  26* 23* 18  
CREA 1.24  --   --  1.18  1.24 1.33* 1.29  
CA 8.3*  --   --  8.4*  8.6 9.2 9.2 AGAP 9  --   --  8  8 9 9 BUCR 20  --   --  21*  21* 17 14 AP  --   --   --  72  --  86  
TP  --   --   --  6.8  --  7.9 ALB  --   --   --  3.1*  3.1*  --  3.5 GLOB  --   --   --  3.7  --  4.4* AGRAT  --   --   --  0.8  --  0.8 CBC w/Diff Recent Labs 10/28/20 
0077 10/27/20 
0258 10/25/20 
2020 WBC 8.1 10.9 10.9 RBC 3.81* 3.64* 3.59* HGB 11.8* 11.3* 11.3* HCT 33.0* 31.3* 31.5*  263 276 GRANS 87* 88* 81* LYMPH 9* 5* 10* EOS 0 0 0 Microbiology Recent Labs 10/27/20 
1730 10/25/20 
2350 10/25/20 
2020 CULT PENDING NO GROWTH 2 DAYS NO GROWTH 3 DAYS  
  
 
 
RADIOLOGY: 
 
All available imaging studies/reports in [facility] for this admission were reviewed Total time spent >35 minutes. High complexity decision making was performed during the evaluation of this patient at high risk for decompensation with multiple organ involvement Above mentioned total time spent on reviewing the case/medical record/data/notes/EMR/patient examination/documentation/coordinating care with nurse/consultants, exclusive of procedures with complex decision making performed and > 50% time spent in face to face evaluation. Dr. Suly Jimenes, Infectious Disease Specialist 
[phone] October 28, 2020 
10:29 AM

## 2020-10-28 NOTE — PROGRESS NOTES
[address] [place] [address] [facility] Pulmonary, Critical Care, and Sleep Medicine Name: Meliton Spatz MRN: [id number] : 1960 Hospital: [address] [place] [address] Date: 10/27/2020 Pulmonary F/U Consult Requesting MD: Citlalli Dalton                                                  Reason for CC Consult: hypoxic resp failure IMPRESSION:  
· Acute Hypoxic resp failure currently on 4 L NC, paO2 in 50's on ABG 
· B/L infiltrates-likely secondary to pulmonary edema · Acute CHF exacerbation- elevated pro-BNP, b/l edema, pulm HTN seen on CT chest, increased abdominal swelling, JVD, LVH on EKG. TTE reveals severely reduced LVEF of 20-25% · Pulm HTN suspected per CT chest -- due to [facility] group 2 disease given severe cardiomyopathy · COVID PUI  
· Nicotine dependence to cigarettes: 2 ppd smoker, 80 pack year history · Chronic hyponatremia- 120's, schizophrenia diagnosis per chart review, ?polydipsia vs ?cirrhosis · Elevated troponin RECOMMENDATIONS:  
Maintain net negative fluid balance as renal function tolerates. Diuresis per Cardiology and Nephrology Regimen of systolic heart failure per cardiology. Given normal renal function, patient may benefit from administration of ACE inhibitor as well as potential administration of carvedilol, will defer to their service Given negative procalcitonin and white count and echo results, would advise discontinuing antibiotics from a pulmonary perspective. Although respiratory distress is from CHF exacerbation, would recommend discontinuing Decadron once COVID-19 RT-PCR comes back negative Bronchodilators as needed Supplemental oxygen to maintain SpO2 >88% Please assess for home oxygen need prior to discharge Aggressive pulmonary toileting/bronchial hygiene Frequent incentive spirometry Aspiration precautions including elevating HOB >30deg PT/OT, OOB, ambulate with assistance as tolerated DVT ppx per primary service Smoking cessation counseling Nutrition consult per primary service Advised to follow-up with with regards to right upper quadrant ultrasound Will follow Subjective/History:  
10/27/2020: 
Patient seen and examined at bedside. Patient woken up from nap. Patient requested to be left alone. Patient denying any complaints, he did not answer any further questions. Int Hx: This patient has been seen and evaluated at the request of Dr. Deborah Sam for hypox resp failure. Patient is a 63 y.o. male w/ PMH of schizophrenia, tob abuse, and HTN per chart presenting to [address] [place] [address] for complaints of shortness of breath, hypoxia. He is a pleasant man but history is limited. Patient states his symptoms started 4-5 days ago. He present to ED on 10/21 w/ complaints of cough, SOB, subjective fever. COVID (-) at that time. Today patient c/o chest tightness and SOB, especially with exertion. ROS (+) for loss of appetite, abdominal swelling, orthopnea. He is denying cough and fevers today. Denies HA, sore throat, anosmia, change in taste, n/v/d, abdominal pain, LE edema. States he does not take any medications at home aside from what was prescribed last ED visit. Chart review shows he may have psych meds but unable to see in chart. Denies ETOH abuse but appears tremulous. Smokes 2 PPD since 25 yo. Denies drug abuse. 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 Denies asbestos exposure. Past Medical History:  
Diagnosis Date  Hypertension  Psychiatric diagnosis History reviewed. No pertinent surgical history. Prior to Admission medications Medication Sig Start Date End Date Taking? Authorizing Provider  
lisinopriL (PRINIVIL, ZESTRIL) 5 mg tablet Take  by mouth.  3/13/20  Yes Provider, Historical  
amLODIPine (NORVASC) 10 mg tablet 10 mg. 9/19/20   Provider, Historical  
Vitamin C 500 mg tablet  8/15/20   Provider, Historical  
ferrous sulfate (IRON) 325 mg (65 mg iron) EC tablet  9/19/20   Provider, Historical  
 haloperidoL (HALDOL) 10 mg tablet  9/19/20   Provider, Historical  
omeprazole (PRILOSEC) 20 mg capsule  8/15/20   Provider, Historical  
cefdinir (OMNICEF) 300 mg capsule Take 1 Cap by mouth two (2) times a day for 7 days. 10/21/20 10/28/20  Caprice Beech, DO  
doxycycline (VIBRA-TABS) 100 mg tablet Take 1 Tab by mouth two (2) times a day for 7 days. 10/21/20 10/28/20  Caprice Beech, DO  
albuterol sulfate (PROAIR RESPICLICK) 90 mcg/actuation breath activated inhaler Take 2 Puffs by inhalation every four (4) hours as needed for Wheezing, Shortness of Breath or Cough. 10/21/20   Caprice Beech, DO  
inhalational spacing device 1 Each by Does Not Apply route as needed for Wheezing. 10/21/20   Caprice Beech, DO  
 
Current Facility-Administered Medications Medication Dose Route Frequency  metoprolol tartrate (LOPRESSOR) tablet 25 mg  25 mg Oral Q12H  
 dexamethasone (DECADRON) 4 mg/mL injection 6 mg  6 mg IntraVENous Q24H  hydrALAZINE (APRESOLINE) tablet 50 mg  50 mg Oral TID  cefTRIAXone (ROCEPHIN) 2 g in sterile water (preservative free) 20 mL IV syringe  2 g IntraVENous Q24H  
 enoxaparin (LOVENOX) injection 40 mg  40 mg SubCUTAneous DAILY  azithromycin (ZITHROMAX) 500 mg in  mL  500 mg IntraVENous Q24H  
 thiamine (B-1) 200 mg in 0.9% sodium chloride 100 mL IVPB  200 mg IntraVENous DAILY  folic acid (FOLVITE) 1 mg in 0.9% sodium chloride 50 mL ivpb   IntraVENous DAILY  [Held by provider] lisinopriL (PRINIVIL, ZESTRIL) tablet 40 mg  40 mg Oral DAILY  ipratropium-albuterol (COMBIVENT RESPIMAT) 20 mcg-100 mcg inhalation spray  2 Puff Inhalation Q6H RT  
 haloperidoL (HALDOL) tablet 10 mg  10 mg Oral DAILY  pantoprazole (PROTONIX) tablet 40 mg  40 mg Oral DAILY No Known Allergies Social History Tobacco Use  Smoking status: Not on file Substance Use Topics  Alcohol use: Not on file History reviewed. No pertinent family history. Review of Systems: A comprehensive review of systems was negative except for that written in the HPI. Objective:  
Vital Signs:   
Visit Vitals /76 (BP 1 Location: Left arm) Pulse 78 Temp 99.1 °F (37.3 °C) Resp 20 Ht 5' 9\" (1.753 m) Wt 88.5 kg (195 lb) SpO2 96% BMI 28.80 kg/m² O2 Device: Nasal cannula O2 Flow Rate (L/min): 4 l/min Temp (24hrs), Av.9 °F (37.2 °C), Min:97.3 °F (36.3 °C), Max:100.1 °F (37.8 °C) Intake/Output:  
Last shift:      10/27 1901 - 10/28 07 In: -  
Out: 8040 [SKFEU:8006] Last 3 shifts: 10/26 07 - 10/27 1900 In: 370 [I.V.:370] Out: 2700 [Urine:2700] Intake/Output Summary (Last 24 hours) at 10/27/2020 2347 Last data filed at 10/27/2020 2026 Gross per 24 hour Intake 370 ml Output 3750 ml Net -3380 ml Physical Exam: 
 
General:   Sleeping, uncooperative, no acute distress, appears stated age. Head:  Normocephalic, without obvious abnormality, atraumatic. Eyes:  Conjunctivae/corneas clear. No ocular drainage Nose: Nares normal. Septum midline. No drainage Throat: Lips, mucosa, and tongue normal.  Poor dentition Neck: Supple, symmetrical, trachea midline, positive JVD Back:   Symmetric, no curvature. ROM normal.  
Lungs:    Patient declined this portion of examination Chest wall:  No tenderness or deformity. Heart:   Patient declined this portion of examination Abdomen:    Patient declined this portion of examination Extremities: Extremities normal, atraumatic, no cyanosis or edema. Pulses:  Patient declined this portion of examination Skin: Skin color, texture, turgor normal. No rashes or lesions. Normal cap refill. Lymph nodes:  Patient declined this portion of examination Neurologic: Grossly nonfocal, able to move all extremities, coordination appears intact Data:  
 
Recent Results (from the past 24 hour(s)) CBC WITH AUTOMATED DIFF Collection Time: 10/27/20  2:58 AM  
Result Value Ref Range WBC 10.9 4.6 - 13.2 K/uL  
 RBC 3.64 (L) 4.70 - 5.50 M/uL  
 HGB 11.3 (L) 13.0 - 16.0 g/dL HCT 31.3 (L) 36.0 - 48.0 % MCV 86.0 74.0 - 97.0 FL  
 MCH 31.0 24.0 - 34.0 PG  
 MCHC 36.1 31.0 - 37.0 g/dL  
 RDW 13.4 11.6 - 14.5 % PLATELET 114 275 - 999 K/uL MPV 10.6 9.2 - 11.8 FL  
 NEUTROPHILS 88 (H) 40 - 73 % LYMPHOCYTES 5 (L) 21 - 52 % MONOCYTES 7 3 - 10 % EOSINOPHILS 0 0 - 5 % BASOPHILS 0 0 - 2 %  
 ABS. NEUTROPHILS 9.5 (H) 1.8 - 8.0 K/UL  
 ABS. LYMPHOCYTES 0.6 (L) 0.9 - 3.6 K/UL  
 ABS. MONOCYTES 0.8 0.05 - 1.2 K/UL  
 ABS. EOSINOPHILS 0.0 0.0 - 0.4 K/UL  
 ABS. BASOPHILS 0.0 0.0 - 0.1 K/UL  
 DF AUTOMATED    
EKG, 12 LEAD, SUBSEQUENT Collection Time: 10/27/20  7:42 AM  
Result Value Ref Range Ventricular Rate 88 BPM  
 Atrial Rate 88 BPM  
 P-R Interval 144 ms QRS Duration 98 ms Q-T Interval 394 ms QTC Calculation (Bezet) 476 ms Calculated P Axis 46 degrees Calculated R Axis -45 degrees Calculated T Axis -84 degrees Diagnosis Normal sinus rhythm Left atrial enlargement Left anterior fascicular block Left ventricular hypertrophy Cannot rule out Septal infarct (cited on or before 21-OCT-2020) Abnormal ECG When compared with ECG of 25-OCT-2020 20:46, 
Questionable change in initial forces of Septal leads Nonspecific T wave abnormality now evident in Lateral leads Confirmed by Mary Ulrich MD, Randi Chamberlain ([id number]) on 10/27/2020 1:31:36 PM 
  
T4, FREE Collection Time: 10/27/20 10:40 AM  
Result Value Ref Range T4, Free 1.2 0.7 - 1.5 NG/DL MAGNESIUM Collection Time: 10/27/20 10:40 AM  
Result Value Ref Range Magnesium 1.9 1.6 - 2.6 mg/dL METABOLIC PANEL, COMPREHENSIVE Collection Time: 10/27/20 10:40 AM  
Result Value Ref Range Sodium 120 (L) 136 - 145 mmol/L Potassium 4.9 3.5 - 5.5 mmol/L Chloride 86 (L) 100 - 111 mmol/L  
 CO2 26 21 - 32 mmol/L Anion gap 8 3.0 - 18 mmol/L Glucose 101 (H) 74 - 99 mg/dL BUN 25 (H) 7.0 - 18 MG/DL Creatinine 1.18 0.6 - 1.3 MG/DL  
 BUN/Creatinine ratio 21 (H) 12 - 20 GFR est AA >60 >60 ml/min/1.73m2 GFR est non-AA >60 >60 ml/min/1.73m2 Calcium 8.4 (L) 8.5 - 10.1 MG/DL Bilirubin, total 0.4 0.2 - 1.0 MG/DL  
 ALT (SGPT) 25 16 - 61 U/L  
 AST (SGOT) 25 10 - 38 U/L Alk. phosphatase 72 45 - 117 U/L Protein, total 6.8 6.4 - 8.2 g/dL Albumin 3.1 (L) 3.4 - 5.0 g/dL Globulin 3.7 2.0 - 4.0 g/dL A-G Ratio 0.8 0.8 - 1.7 PHOSPHORUS Collection Time: 10/27/20 10:40 AM  
Result Value Ref Range Phosphorus 4.7 2.5 - 4.9 MG/DL  
TSH 3RD GENERATION Collection Time: 10/27/20 10:40 AM  
Result Value Ref Range TSH 0.69 0.36 - 3.74 uIU/mL RENAL FUNCTION PANEL Collection Time: 10/27/20 10:40 AM  
Result Value Ref Range Sodium 121 (L) 136 - 145 mmol/L Potassium 4.9 3.5 - 5.5 mmol/L Chloride 86 (L) 100 - 111 mmol/L  
 CO2 27 21 - 32 mmol/L Anion gap 8 3.0 - 18 mmol/L Glucose 104 (H) 74 - 99 mg/dL BUN 26 (H) 7.0 - 18 MG/DL Creatinine 1.24 0.6 - 1.3 MG/DL  
 BUN/Creatinine ratio 21 (H) 12 - 20 GFR est AA >60 >60 ml/min/1.73m2 GFR est non-AA 60 (L) >60 ml/min/1.73m2 Calcium 8.6 8.5 - 10.1 MG/DL Phosphorus 4.7 2.5 - 4.9 MG/DL Albumin 3.1 (L) 3.4 - 5.0 g/dL NT-PRO BNP Collection Time: 10/27/20 10:40 AM  
Result Value Ref Range NT pro-BNP 5,093 (H) 0 - 900 PG/ML  
SODIUM Collection Time: 10/27/20 11:00 AM  
Result Value Ref Range Sodium 121 (L) 136 - 145 mmol/L  
ECHO ADULT FOLLOW-UP OR LIMITED Collection Time: 10/27/20  3:50 PM  
Result Value Ref Range IVSd 1.04 (A) 0.6 - 1.0 cm LVIDd 6.32 (A) 4.2 - 5.9 cm LVIDs 6.03 cm  
 LVPWd 1.26 (A) 0.6 - 1.0 cm Tapse 2.73 (A) 1.5 - 2.0 cm Triscuspid Valve Regurgitation Peak Gradient 20.35 mmHg  
 TR Max Velocity 225.54 cm/s Ao Root D 4.42 cm IVC proximal 1.02 cm  
 LV Mass .5 88 - 224 g LV Mass AL Index 158. 3 49 - 115 g/m2 SODIUM, UR, RANDOM Collection Time: 10/27/20  5:30 PM  
Result Value Ref Range Sodium,urine random 53 20 - 110 MMOL/L  
SODIUM Collection Time: 10/27/20  7:20 PM  
Result Value Ref Range Sodium 122 (L) 136 - 145 mmol/L Telemetry:normal sinus rhythm Imaging: 
I have personally reviewed the patients radiographs and have reviewed the reports: CXR Results  (Last 48 hours) None CT Results  (Last 48 hours) 10/26/20 0124  CTA CHEST W OR W WO CONT Final result Impression:  IMPRESSION:  
   
1. Study is limited by respiratory motion, but no definite pulmonary embolism. 2. Enlarged main pulmonary artery suggests pulmonary hypertension. 3. Small right and trace left pleural effusions. Mild overlying consolidation on  
the right may represent atelectasis or infiltrate. Patchy groundglass  
consolidations in the bilateral upper lobes may represent multifocal infection  
versus atypical edema. 4. Cholelithiasis. 5. Ascending aorta measures up to 4.5 cm. Descending aorta measures up to 4.0  
cm. Thank you for enabling us to participate in the care of this patient. Narrative:  EXAM: CTA CHEST PULMONARY EMBOLISM CLINICAL INDICATION/HISTORY:  
   
COMPARISON: r/o PE . Shortness of breath and has progressively worsened over the  
past few days. TECHNIQUE: CTA of the chest was performed using timing optimized for pulmonary  
embolism technique, with 100 cc of Isovue-370 IV contrast.  To maximize  
sensitivity the sagittal and coronal reconstructions were created using a 3D  
multislice MIP (maximal intensity projection) methodology. CT scans at this facility are performed using dose optimization technique as  
appropriate to the performed exam, to include automated exposure control,  
adjustment of the mA and/or kV according to patient size (including appropriate matching for site specific examinations), or use of iterative reconstruction  
technique. FINDINGS:  
Pulmonary arteries (includes assessment of MIP images): Study is limited by  
respiratory motion. Within this limitation, no filling defects are seen in the  
main, lobar, or visualized segmental arteries. Aorta and other cardiovascular structures: Study is not timed for the detection  
of aortic dissection. Ascending aorta measures up to 4.5 cm. Descending aorta  
measures up to 4.0 cm. Heart is enlarged. Heart Strain assessment:  
-  RV/LV ratio (normal <0.9): Normal  
-  Dysfunction or bowing of interventricular septum: None -  Main pulmonary artery enlargement (>30mm): 3.7 cm -  There is visualization of contrast reflux from the right heart into the  
IVC/hepatic veins. Lung/Airway:  Small right pleural effusion with mild overlying consolidation. Trace left pleural effusion. Patchy groundglass consolidations at the bilateral  
apices. Small amount of secretions in the trachea Lymph nodes:  No lymphadenopathy. Chest wall and lower neck: Negative. Upper abdominal structures: Bilateral renal cysts. Additional subcentimeter  
hypoattenuating lesions bilaterally are too small to characterize, but likely  
cysts. Cholelithiasis. Bones: Negative. Lyubov West MD/MPH Pulmonary, Critical Care Medicine [facility]

## 2020-10-28 NOTE — PROGRESS NOTES
Decreased O2 to 3 L 
 
 10/28/20 9057 Oxygen Therapy O2 Sat (%) 99 % Pulse via Oximetry 86 beats per minute O2 Device Nasal cannula O2 Flow Rate (L/min) 4 l/min 
(decreased to 3) Pre-Treatment Breathing Pattern Regular Breath Sounds Bilateral Diminished Procedures  
$$ Subsequent Procedure MDI Delivery Source MDI Aerosolized Medications Other (comment) 
(combivent)

## 2020-10-28 NOTE — PROGRESS NOTES
Breathing is much better, pt is stable 10/28/20 4679 Oxygen Therapy O2 Sat (%) 99 % Pulse via Oximetry 86 beats per minute O2 Device Nasal cannula Pre-Treatment Breathing Pattern Regular Breath Sounds Bilateral Diminished Procedures  
$$ Subsequent Procedure MDI Delivery Source MDI Aerosolized Medications Other (comment) 
(combivent)

## 2020-10-28 NOTE — PROGRESS NOTES
[facility] Progress Note Patient: Attila Kruse MRN: [id number] SSN: [id number]  YOB: 1960 Age: 63 y.o. Sex: male Admit Date: 10/25/2020 LOS: 2 days Chief Complaint Patient presents with  Shortness of Breath Subjective:  
 
Patient is overall well today, states he feels his breathing is better overall. He is currently on 4L. I explained to him that we are concerned about his cardiac function, and with a little explanation he seemed to understand it was in his best interests to stay inpatient for the time being and continue treatment. Review of Systems Constitutional: Negative for chills, diaphoresis, fever and malaise/fatigue. Respiratory: Negative for cough, shortness of breath and wheezing. Cardiovascular: Negative for chest pain, palpitations and leg swelling. Gastrointestinal: Negative for constipation and diarrhea. Neurological: Negative for focal weakness and loss of consciousness. Objective:  
 
Visit Vitals /79 (BP 1 Location: Left arm, BP Patient Position: At rest) Pulse 62 Temp 98.3 °F (36.8 °C) Resp 20 Ht 5' 9\" (1.753 m) Wt 88.5 kg (195 lb) SpO2 97% BMI 28.80 kg/m² Physical Exam:  
Physical Exam  
Constitutional: He appears well-developed and well-nourished. HENT:  
Head: Normocephalic and atraumatic. Eyes: Pupils are equal, round, and reactive to light. Conjunctivae and EOM are normal.  
Neck: Normal range of motion. Neck supple. Cardiovascular: Normal rate and regular rhythm. Exam reveals gallop. Pulmonary/Chest: Effort normal. He has no wheezes. Abdominal: Soft. Bowel sounds are normal.  
Skin: Skin is warm and dry. Intake and Output: 
Current Shift: 10/28 0701 - 10/28 1900 In: 270 [P.O.:120; I.V.:150] Out: 600 [Urine:600] Last three shifts: 10/26 1901 - 10/28 0700 In: 370 [I.V.:370] Out: 4850 [NRNHD:1652] Lab/Data Review: 
Recent Results (from the past 12 hour(s)) MAGNESIUM Collection Time: 10/28/20  4:50 AM  
Result Value Ref Range Magnesium 2.3 1.6 - 2.6 mg/dL PHOSPHORUS Collection Time: 10/28/20  4:50 AM  
Result Value Ref Range Phosphorus 4.6 2.5 - 4.9 MG/DL  
CBC WITH AUTOMATED DIFF Collection Time: 10/28/20  4:50 AM  
Result Value Ref Range WBC 8.1 4.6 - 13.2 K/uL  
 RBC 3.81 (L) 4.70 - 5.50 M/uL  
 HGB 11.8 (L) 13.0 - 16.0 g/dL HCT 33.0 (L) 36.0 - 48.0 % MCV 86.6 74.0 - 97.0 FL  
 MCH 31.0 24.0 - 34.0 PG  
 MCHC 35.8 31.0 - 37.0 g/dL  
 RDW 13.4 11.6 - 14.5 % PLATELET 900 602 - 416 K/uL MPV 9.8 9.2 - 11.8 FL  
 NEUTROPHILS 87 (H) 40 - 73 % LYMPHOCYTES 9 (L) 21 - 52 % MONOCYTES 4 3 - 10 % EOSINOPHILS 0 0 - 5 % BASOPHILS 0 0 - 2 %  
 ABS. NEUTROPHILS 7.1 1.8 - 8.0 K/UL  
 ABS. LYMPHOCYTES 0.7 (L) 0.9 - 3.6 K/UL  
 ABS. MONOCYTES 0.3 0.05 - 1.2 K/UL  
 ABS. EOSINOPHILS 0.0 0.0 - 0.4 K/UL  
 ABS. BASOPHILS 0.0 0.0 - 0.1 K/UL  
 DF AUTOMATED METABOLIC PANEL, BASIC Collection Time: 10/28/20  4:50 AM  
Result Value Ref Range Sodium 122 (L) 136 - 145 mmol/L Potassium 4.6 3.5 - 5.5 mmol/L Chloride 87 (L) 100 - 111 mmol/L  
 CO2 26 21 - 32 mmol/L Anion gap 9 3.0 - 18 mmol/L Glucose 127 (H) 74 - 99 mg/dL BUN 25 (H) 7.0 - 18 MG/DL Creatinine 1.24 0.6 - 1.3 MG/DL  
 BUN/Creatinine ratio 20 12 - 20 GFR est AA >60 >60 ml/min/1.73m2 GFR est non-AA 60 (L) >60 ml/min/1.73m2 Calcium 8.3 (L) 8.5 - 10.1 MG/DL  
EKG, 12 LEAD, SUBSEQUENT Collection Time: 10/28/20  6:59 AM  
Result Value Ref Range Ventricular Rate 72 BPM  
 Atrial Rate 72 BPM  
 P-R Interval 144 ms QRS Duration 96 ms  
 Q-T Interval 484 ms QTC Calculation (Bezet) 529 ms Calculated P Axis 34 degrees Calculated R Axis -56 degrees Calculated T Axis -154 degrees Diagnosis Sinus rhythm with occasional premature ventricular complexes Possible Left atrial enlargement Left anterior fascicular block Left ventricular hypertrophy ST & T wave abnormality, consider inferior ischemia ST & T wave abnormality, consider anterolateral ischemia Prolonged QT Abnormal ECG When compared with ECG of 27-OCT-2020 07:42, 
premature ventricular complexes are now present T wave inversion now evident in Anterolateral leads QT has lengthened Confirmed by Tyson Abreu MD, --- ([id number]) on 10/28/2020 11:35:23 AM 
  
SODIUM Collection Time: 10/28/20  2:22 PM  
Result Value Ref Range Sodium 125 (L) 136 - 145 mmol/L Telemetry Yes Oxygen NC 4L Assessment and Plan:  
63 y.o. male with PMH COPD, Schizophrenia, HTN, h/o anemia, LVH, QT prolongation, now presenting with complaint of Shortness of breath, hypoxemia. 
  
Acute Hypoxic Respiratory Failure. Echocardiogram done IP shows EF of 20-25%. This, combined with an increasing BNP, is indicative of Congestive heart failure, with reduced ejection fraction. Echo also showed potential mitral valve vegetation. 
 
-Pulm, cards, nephro, and ID consulted, their recs appreciated. -DC Ceftriaxone and azithromycin given CHF picture 
-Decadron until COVID (-) test result 
-combivent PRN 
-Lasix 20mg IV BID 
-Strict I/Os 
-Fluid restriction 1200ml 
  
Hypervolemic hypotonic hyponatremia, Moderate, likely acute on chronic. 
-Moderate for now, slowly normalizing 
-Nephro consulted, appreciate their recs 
-BMP, mag, phos daily 
-Sodium q8h 
  
QT prolongation 
-Daily EKG 
-Careful with QT prolonging meds 
  
Schizophrenia 
-Continue home haloperidol 10mg every day. 
  
HTN 
-hold home meds 
-Hydralazine and metoprolol 
  
Elevated troponins 
-likely secondary to demand ischemia 
-daily ekg  
  
  
Diet DIET REGULAR 
DIET NPO  
DVT Prophylaxis Lovenox GI Prophylaxis Protonix Code status Full Disposition 2-3 MN  
  
Point of Contact Torres Love MD, PGY-1  
[facility] Medicine Intern Pager: [phone] October 28, 2020, 8:44 AM

## 2020-10-28 NOTE — PROGRESS NOTES
[address] [place] [address] [facility] Pulmonary, Critical Care, and Sleep Medicine Name: Darcey Cogan MRN: [id number] : 1960 Hospital: [facility] Date: 10/28/2020 Pulmonary F/U Consult Requesting MD: Mary Mcduffie                                                  Reason for CC Consult: hypoxic resp failure IMPRESSION:  
· Acute Hypoxic resp failure currently on 4 L NC, paO2 in 50's on ABG 
· B/L infiltrates-likely secondary to pulmonary edema · Acute CHF exacerbation- elevated pro-BNP, b/l edema, pulm HTN seen on CT chest, increased abdominal swelling, JVD, LVH on EKG. TTE reveals severely reduced LVEF of 20-25% · Pulm HTN suspected per CT chest -- due to [facility] group 2 disease given severe cardiomyopathy · COVID PUI  
· Nicotine dependence to cigarettes: 2 ppd smoker, 80 pack year history · Chronic hyponatremia- 120's, schizophrenia diagnosis per chart review, ?polydipsia vs ?cirrhosis · Elevated troponin RECOMMENDATIONS:  
Maintain net negative fluid balance as renal function tolerates. Diuresis per Cardiology and Nephrology Regimen of systolic heart failure per cardiology. Holding ACEi for cath. Agree with coreg Given negative procalcitonin and white count and echo results, would advise discontinuing antibiotics from a pulmonary perspective. ABx per ID Although respiratory distress is from CHF exacerbation, would recommend discontinuing Decadron once COVID-19 RT-PCR comes back negative Bronchodilators as needed Supplemental oxygen to maintain SpO2 >88% Please assess for home oxygen need prior to discharge Aggressive pulmonary toileting/bronchial hygiene Frequent incentive spirometry Aspiration precautions including elevating HOB >30deg PT/OT, OOB, ambulate with assistance as tolerated DVT ppx per primary service Smoking cessation counseling Nutrition consult per primary service Advised to follow-up with with regards to right upper quadrant ultrasound Joce Lopez marisol Will follow Subjective/History:  
10/28/20 Patient seen and examined at bedside. Pt seen and examined at bedside. Denies any chest pain, nausea, vomiting, diarrhea. Reports shortness of breath is improving. No sputum or hemoptysis. Int Hx: This patient has been seen and evaluated at the request of Dr. Nicole Barajas for hypox resp failure. Patient is a 63 y.o. male w/ PMH of schizophrenia, tob abuse, and HTN per chart presenting to [facility] for complaints of shortness of breath, hypoxia. He is a pleasant man but history is limited. Patient states his symptoms started 4-5 days ago. He present to ED on 10/21 w/ complaints of cough, SOB, subjective fever. COVID (-) at that time. Today patient c/o chest tightness and SOB, especially with exertion. ROS (+) for loss of appetite, abdominal swelling, orthopnea. He is denying cough and fevers today. Denies HA, sore throat, anosmia, change in taste, n/v/d, abdominal pain, LE edema. States he does not take any medications at home aside from what was prescribed last ED visit. Chart review shows he may have psych meds but unable to see in chart. Denies ETOH abuse but appears tremulous. Smokes 2 PPD since 27 yo. Denies drug abuse. 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 Denies asbestos exposure. Past Medical History:  
Diagnosis Date  Hypertension  Psychiatric diagnosis History reviewed. No pertinent surgical history. Prior to Admission medications Medication Sig Start Date End Date Taking? Authorizing Provider  
lisinopriL (PRINIVIL, ZESTRIL) 5 mg tablet Take  by mouth.  3/13/20  Yes Provider, Historical  
amLODIPine (NORVASC) 10 mg tablet 10 mg. 9/19/20   Provider, Historical  
Vitamin C 500 mg tablet  8/15/20   Provider, Historical  
ferrous sulfate (IRON) 325 mg (65 mg iron) EC tablet  9/19/20   Provider, Historical  
haloperidoL (HALDOL) 10 mg tablet  9/19/20   Provider, Historical  
 omeprazole (PRILOSEC) 20 mg capsule  8/15/20   Provider, Historical  
cefdinir (OMNICEF) 300 mg capsule Take 1 Cap by mouth two (2) times a day for 7 days. 10/21/20 10/28/20  Zafar Alexander DO  
doxycycline (VIBRA-TABS) 100 mg tablet Take 1 Tab by mouth two (2) times a day for 7 days. 10/21/20 10/28/20  Zafar Alexander DO  
albuterol sulfate (PROAIR RESPICLICK) 90 mcg/actuation breath activated inhaler Take 2 Puffs by inhalation every four (4) hours as needed for Wheezing, Shortness of Breath or Cough. 10/21/20   Zafar Alexander DO  
inhalational spacing device 1 Each by Does Not Apply route as needed for Wheezing. 10/21/20   Zafar Alexander, DO  
 
Current Facility-Administered Medications Medication Dose Route Frequency  carvediloL (COREG) tablet 12.5 mg  12.5 mg Oral BID WITH MEALS  dexamethasone (DECADRON) 4 mg/mL injection 6 mg  6 mg IntraVENous Q24H  hydrALAZINE (APRESOLINE) tablet 50 mg  50 mg Oral TID  enoxaparin (LOVENOX) injection 40 mg  40 mg SubCUTAneous DAILY  thiamine (B-1) 200 mg in 0.9% sodium chloride 100 mL IVPB  200 mg IntraVENous DAILY  folic acid (FOLVITE) 1 mg in 0.9% sodium chloride 50 mL ivpb   IntraVENous DAILY  [Held by provider] lisinopriL (PRINIVIL, ZESTRIL) tablet 40 mg  40 mg Oral DAILY  haloperidoL (HALDOL) tablet 10 mg  10 mg Oral DAILY  pantoprazole (PROTONIX) tablet 40 mg  40 mg Oral DAILY No Known Allergies Social History Tobacco Use  Smoking status: Not on file Substance Use Topics  Alcohol use: Not on file History reviewed. No pertinent family history. Review of Systems: A comprehensive review of systems was negative except for that written in the HPI. Objective:  
Vital Signs:   
Visit Vitals /79 (BP 1 Location: Left arm, BP Patient Position: At rest) Pulse 62 Temp 98.3 °F (36.8 °C) Resp 20 Ht 5' 9\" (1.753 m) Wt 88.5 kg (195 lb) SpO2 97% BMI 28.80 kg/m² O2 Device: Nasal cannula O2 Flow Rate (L/min): 3 l/min Temp (24hrs), Av.6 °F (37 °C), Min:97.8 °F (36.6 °C), Max:99.6 °F (37.6 °C) Intake/Output:  
Last shift:      10/28 0701 - 10/28 1900 In: 270 [P.O.:120; I.V.:150] Out: 600 [Urine:600] Last 3 shifts: 10/26 1901 - 10/28 0700 In: 370 [I.V.:370] Out: 4850 [GQHYT:2846] Intake/Output Summary (Last 24 hours) at 10/28/2020 1634 Last data filed at 10/28/2020 1254 Gross per 24 hour Intake 640 ml Output 3750 ml Net -3110 ml Physical Exam: 
 
General:   Alert, cooperative, comfortable, laying in bed, no acute distress, appears stated age, wearing nasal cannula Head:  Normocephalic, without obvious abnormality, atraumatic. Eyes:  Conjunctivae/corneas clear. No ocular drainage, PERRLA, EOMI Nose: Nares normal. Septum midline. No drainage Throat: Lips, mucosa, and tongue normal.  Poor dentition Neck: Supple, symmetrical, trachea midline, positive JVD Back:   Symmetric, no curvature. ROM normal.  
Lungs:    Distant breath sounds bilaterally, some fine rales in bilateral bases, otherwise clear to auscultation no wheezes rhonchi Chest wall:  No tenderness or deformity. Heart:   Regular rate and rhythm, S1/S2, no murmurs/rubs/gallops Abdomen:    Soft, nontender, bowel sounds present, nondistended, no HSM Extremities: Extremities normal, atraumatic, no cyanosis or edema. No clubbing Pulses:  2+ and symmetric in all extremities Skin: Skin color, texture, turgor normal. No rashes or lesions. Normal cap refill. Lymph nodes:  Cervical, supraclavicular, axillary lymph nodes are all negative Neurologic: Grossly nonfocal, able to move all extremities, coordination appears intact Data:  
 
Recent Results (from the past 24 hour(s)) CULTURE, RESPIRATORY/SPUTUM/BRONCH W GRAM STAIN Collection Time: 10/27/20  5:30 PM  
 Specimen: Sputum Result Value Ref Range  Special Requests: NO SPECIAL REQUESTS    
 GRAM STAIN FEW WBCS SEEN    
 GRAM STAIN RARE EPITHELIAL CELLS SEEN    
 GRAM STAIN NO ORGANISMS SEEN Culture result: LIGHT NORMAL RESPIRATORY HAY  . .. SO FAR    
SODIUM, UR, RANDOM Collection Time: 10/27/20  5:30 PM  
Result Value Ref Range Sodium,urine random 53 20 - 110 MMOL/L  
OSMOLALITY, UR Collection Time: 10/27/20  5:30 PM  
Result Value Ref Range Osmolality,urine 269 50 - 1,400 MOSM/kg H2O  
SODIUM Collection Time: 10/27/20  7:20 PM  
Result Value Ref Range Sodium 122 (L) 136 - 145 mmol/L MAGNESIUM Collection Time: 10/28/20  4:50 AM  
Result Value Ref Range Magnesium 2.3 1.6 - 2.6 mg/dL PHOSPHORUS Collection Time: 10/28/20  4:50 AM  
Result Value Ref Range Phosphorus 4.6 2.5 - 4.9 MG/DL  
CBC WITH AUTOMATED DIFF Collection Time: 10/28/20  4:50 AM  
Result Value Ref Range WBC 8.1 4.6 - 13.2 K/uL  
 RBC 3.81 (L) 4.70 - 5.50 M/uL  
 HGB 11.8 (L) 13.0 - 16.0 g/dL HCT 33.0 (L) 36.0 - 48.0 % MCV 86.6 74.0 - 97.0 FL  
 MCH 31.0 24.0 - 34.0 PG  
 MCHC 35.8 31.0 - 37.0 g/dL  
 RDW 13.4 11.6 - 14.5 % PLATELET 459 836 - 318 K/uL MPV 9.8 9.2 - 11.8 FL  
 NEUTROPHILS 87 (H) 40 - 73 % LYMPHOCYTES 9 (L) 21 - 52 % MONOCYTES 4 3 - 10 % EOSINOPHILS 0 0 - 5 % BASOPHILS 0 0 - 2 %  
 ABS. NEUTROPHILS 7.1 1.8 - 8.0 K/UL  
 ABS. LYMPHOCYTES 0.7 (L) 0.9 - 3.6 K/UL  
 ABS. MONOCYTES 0.3 0.05 - 1.2 K/UL  
 ABS. EOSINOPHILS 0.0 0.0 - 0.4 K/UL  
 ABS. BASOPHILS 0.0 0.0 - 0.1 K/UL  
 DF AUTOMATED METABOLIC PANEL, BASIC Collection Time: 10/28/20  4:50 AM  
Result Value Ref Range Sodium 122 (L) 136 - 145 mmol/L Potassium 4.6 3.5 - 5.5 mmol/L Chloride 87 (L) 100 - 111 mmol/L  
 CO2 26 21 - 32 mmol/L Anion gap 9 3.0 - 18 mmol/L Glucose 127 (H) 74 - 99 mg/dL BUN 25 (H) 7.0 - 18 MG/DL Creatinine 1.24 0.6 - 1.3 MG/DL  
 BUN/Creatinine ratio 20 12 - 20 GFR est AA >60 >60 ml/min/1.73m2 GFR est non-AA 60 (L) >60 ml/min/1.73m2  Calcium 8.3 (L) 8.5 - 10.1 MG/DL  
 EKG, 12 LEAD, SUBSEQUENT Collection Time: 10/28/20  6:59 AM  
Result Value Ref Range Ventricular Rate 72 BPM  
 Atrial Rate 72 BPM  
 P-R Interval 144 ms QRS Duration 96 ms  
 Q-T Interval 484 ms QTC Calculation (Bezet) 529 ms Calculated P Axis 34 degrees Calculated R Axis -56 degrees Calculated T Axis -154 degrees Diagnosis Sinus rhythm with occasional premature ventricular complexes Possible Left atrial enlargement Left anterior fascicular block Left ventricular hypertrophy ST & T wave abnormality, consider inferior ischemia ST & T wave abnormality, consider anterolateral ischemia Prolonged QT Abnormal ECG When compared with ECG of 27-OCT-2020 07:42, 
premature ventricular complexes are now present T wave inversion now evident in Anterolateral leads QT has lengthened Confirmed by Olya Singh MD, --- ([id number]) on 10/28/2020 11:35:23 AM 
  
SODIUM Collection Time: 10/28/20  2:22 PM  
Result Value Ref Range Sodium 125 (L) 136 - 145 mmol/L Telemetry:normal sinus rhythm Imaging: 
I have personally reviewed the patients radiographs and have reviewed the reports: CXR Results  (Last 48 hours) None CT Results  (Last 48 hours) None 10/25/20 ECHO ADULT FOLLOW-UP OR LIMITED 10/27/2020 10/27/2020 Narrative · LV: Estimated LVEF is 20 - 25%. Dilated left ventricle. Moderately  
reduced systolic function. · MV: Mitral valve non-specific thickening. Possible small mobile mass on  
anterior leaflet, 5 x 5 mm, cannot exclude vegetation. Signed by: MD Jah Melgar MD/MPH Pulmonary, Critical Care Medicine [facility]

## 2020-10-28 NOTE — PROGRESS NOTES
[facility] - Progress Note Admit Date: 10/25/2020 Assessment:  
 
-Acute systolic heart failure, new diagnosis -New cardiomyopathy with EF 25% by echo 10/28/20 
-Echo 10/28/20, cannot exclude mitral valve mass although given clinical scenario, low suspicion for endocarditis, follow-up blood culttures, ID following 
-Presented due to dyspnea, atypical chest pain. Both improved. -Bilateral infiltrates by chest CTA 10/26/2020, no PE  
-Hyponatremia, Na ~120 this admission, nephrology consulted. Possible polydipsia vs CHF as cause 
-Hx HTN 
-Hx tobacco abuse, reports plan to quit once discharged -Schizophrenia 
  
No primary cardiologist. 
 
Plan: I discussed with patient possible cath tomorrow. He has schizophrenia but seems to be able to understand procedure after describing, he is willing to proceed to exclude CAD. NPO after MN. Will make final decision in AM pending cultures, Cr. 
 
Echo noted to have possible mass on mitral valve, no valve dysfunction. My suspicion is low for endocarditis, discussed with Dr. Balbina Gilbert yesterday, will followup cultures for now. Continue coreg, hold ACEI with planned cath in AM.  Will start maintenance lasix after ischemia workup, compensated today. Subjective: No new complaints. No chest pain, breathing better. Objective:  
  
Patient Vitals for the past 8 hrs: 
 Temp Pulse Resp BP SpO2  
10/28/20 0910 97.8 °F (36.6 °C) 67 20 139/89 100 % 10/28/20 0833     99 % 10/28/20 0342 98.3 °F (36.8 °C) 78 20 (!) 159/72 99 % Patient Vitals for the past 96 hrs: 
 Weight 10/27/20 1623 88.5 kg (195 lb) 10/25/20 2030 88.6 kg (195 lb 6.4 oz) Intake/Output Summary (Last 24 hours) at 10/28/2020 1105 Last data filed at 10/28/2020 2715 Gross per 24 hour Intake 640 ml Output 3950 ml Net -3310 ml Physical Exam: 
General:  alert, cooperative, no distress, appears stated age Neck:  nontender Lungs:  clear to auscultation bilaterally Heart:  regular rate and rhythm, S1, S2 normal, no murmur, click, rub or gallop Abdomen:  abdomen is soft without significant tenderness, masses, organomegaly or guarding Extremities:  extremities normal, atraumatic, no cyanosis or edema Data Review:  
 
Labs: Results:  
   
Chemistry Recent Labs 10/28/20 
0450 10/27/20 
1920 10/27/20 
1100 10/27/20 
1040 10/26/20 
1136 10/25/20 
2020 *  --   --  101*  104* 107* 178* * 122* 121* 120*  121* 120* 120*  
K 4.6  --   --  4.9  4.9 5.5 4.5  
CL 87*  --   --  86*  86* 83* 81* CO2 26  --   --  26  27 28 30 BUN 25*  --   --  25*  26* 23* 18  
CREA 1.24  --   --  1.18  1.24 1.33* 1.29  
CA 8.3*  --   --  8.4*  8.6 9.2 9.2 MG 2.3  --   --  1.9 1.8  --   
PHOS 4.6  --   --  4.7  4.7 5.3*  --   
AGAP 9  --   --  8  8 9 9 BUCR 20  --   --  21*  21* 17 14 AP  --   --   --  72  --  86  
TP  --   --   --  6.8  --  7.9 ALB  --   --   --  3.1*  3.1*  --  3.5 GLOB  --   --   --  3.7  --  4.4* AGRAT  --   --   --  0.8  --  0.8 CBC w/Diff Recent Labs 10/28/20 
6628 10/27/20 
0258 10/25/20 
2020 WBC 8.1 10.9 10.9 RBC 3.81* 3.64* 3.59* HGB 11.8* 11.3* 11.3* HCT 33.0* 31.3* 31.5*  263 276 GRANS 87* 88* 81* LYMPH 9* 5* 10* EOS 0 0 0 Cardiac Enzymes No results found for: CPK, CK, CKMMB, CKMB, RCK3, CKMBT, CKNDX, CKND1, MARLO, TROPT, TROIQ, TAMMY, TROPT, TNIPOC, BNP, BNPP Coagulation No results for input(s): PTP, INR, APTT, INREXT in the last 72 hours. Lipid Panel No results found for: CHOL, CHOLPOCT, CHOLX, CHLST, CHOLV, 332641, HDL, HDLP, LDL, LDLC, DLDLP, 341841, VLDLC, VLDL, TGLX, TRIGL, TRIGP, TGLPOCT, CHHD, CHHDX  
BNP No results found for: BNP, BNPP, XBNPT Liver Enzymes Recent Labs 10/27/20 
1040 TP 6.8 ALB 3.1*  3.1* AP 72 Digoxin Thyroid Studies Lab Results Component Value Date/Time  TSH 0.69 10/27/2020 10:40 AM  
    
 
 Signed By: Alexandria Hanks MD   
 October 28, 2020

## 2020-10-29 NOTE — PROGRESS NOTES
Speech Pathology: Pt currently NPO for cardiac cath. Will complete dysphagia evaluation next business date or as medical condition permits. Thank you for this referral. 
 
Ozzie Benjamin M.S. CCC-SLP/L Speech-Language Pathologist

## 2020-10-29 NOTE — PROGRESS NOTES
Discharge planning: 
 
Patient lives at home with mother, [facility] if needed. Mother to transport. ISRAEL Johnson Case Management [phone]

## 2020-10-29 NOTE — ROUTINE PROCESS
Spoke with night shift resident PFM concerning possible rapid Covid testing; patient scheduled for cath.

## 2020-10-29 NOTE — PROGRESS NOTES
Infectious Disease progress Note Reason: Bilateral pneumonia, suspected COVID-19 pneumonia Current abx Prior abx Ceftriaxone, azithromycin since 10/25 Levofloxacin on 10/25 Lines:  
 
 
Assessment : 
 
63 y.o. male who has a history of COPD, chronic smokersmokes 2 packs/day presents to the emergency room on 10/25/2020 secondary to shortness of breath, hypoxia. Clinical presentation consistent with acute hypoxic respiratory failure secondary to decompensated CHF Low procalcitonin argues against typical bacterial pneumonia-recent outpatient antibiotics can mask the full clinical presentation. Hence will monitor clinically to determine this. Clinical presentation not very consistent with COVID-19 infection. COVID-19 test 10/26 results pending Echocardiogram 10/27-results discussed with cardiologist.  Possible small mobile mass on anterior leaflet of mitral valve 5 x 5 mm concerning for possible vegetation. Clinical presentation is not classic for endocarditis. However prior outpatient antibiotics and current steroids would mask the full clinical presentation. Hence recommend to monitor off antibiotics, and follow-up transesophageal echocardiogram results to definitively determine if patient has mitral valve endocarditis or not Acute on chronic Hyponatremia-nephrology follow-up appreciated Recommendations: 
 
1.  monitor off antibiotics 2.  discontinue Decadron 3.   diuresis per pulmonary/cardiology 4. Await nephrology recommendations for hyponatremia 5. Titrate oxygen as tolerated 6. Recommend transesophageal echocardiogram to evaluate mitral valve vegetation-discussed with Dr. Enzo Palacio. Will perform WADE in a.m. Above plan was discussed in details with patient. Please call me if any further questions or concerns. Will continue to participate in the care of this patient. HPI: 
 
 
Patient states that he feels better. Ruddy Eisenmenger to go home.  Denies any abdominal pain, diarrhea, chest pain. home Medication List  
 Details  
cefdinir (OMNICEF) 300 mg capsule Take 1 Cap by mouth two (2) times a day for 7 days. Qty: 14 Cap, Refills: 0  
  
doxycycline (VIBRA-TABS) 100 mg tablet Take 1 Tab by mouth two (2) times a day for 7 days. Qty: 14 Tab, Refills: 0  
  
albuterol sulfate (PROAIR RESPICLICK) 90 mcg/actuation breath activated inhaler Take 2 Puffs by inhalation every four (4) hours as needed for Wheezing, Shortness of Breath or Cough. Qty: 1 Inhaler, Refills: 0  
  
inhalational spacing device 1 Each by Does Not Apply route as needed for Wheezing. Qty: 1 Device, Refills: 0 Current Facility-Administered Medications Medication Dose Route Frequency  sodium chloride (NS) flush 5-40 mL  5-40 mL IntraVENous Q8H  
 sodium chloride (NS) flush 5-40 mL  5-40 mL IntraVENous PRN  
 ipratropium-albuterol (COMBIVENT RESPIMAT) 20 mcg-100 mcg inhalation spray  2 Puff Inhalation Q6H PRN  
 carvediloL (COREG) tablet 12.5 mg  12.5 mg Oral BID WITH MEALS  dexamethasone (DECADRON) 4 mg/mL injection 6 mg  6 mg IntraVENous Q24H  hydrALAZINE (APRESOLINE) tablet 50 mg  50 mg Oral TID  acetaminophen (TYLENOL) tablet 650 mg  650 mg Oral Q6H PRN Or  
 acetaminophen (TYLENOL) suppository 650 mg  650 mg Rectal Q6H PRN  polyethylene glycol (MIRALAX) packet 17 g  17 g Oral DAILY PRN  
 enoxaparin (LOVENOX) injection 40 mg  40 mg SubCUTAneous DAILY  thiamine (B-1) 200 mg in 0.9% sodium chloride 100 mL IVPB  200 mg IntraVENous DAILY  folic acid (FOLVITE) 1 mg in 0.9% sodium chloride 50 mL ivpb   IntraVENous DAILY  [Held by provider] lisinopriL (PRINIVIL, ZESTRIL) tablet 40 mg  40 mg Oral DAILY  hydrALAZINE (APRESOLINE) 20 mg/mL injection 10 mg  10 mg IntraVENous Q6H PRN  
 haloperidoL (HALDOL) tablet 10 mg  10 mg Oral DAILY  pantoprazole (PROTONIX) tablet 40 mg  40 mg Oral DAILY  trimethobenzamide (TIGAN) injection 200 mg  200 mg IntraMUSCular Q6H PRN Allergies: Patient has no known allergies. Temp (24hrs), Av °F (36.7 °C), Min:97.1 °F (36.2 °C), Max:98.4 °F (36.9 °C) Visit Vitals /88 (BP 1 Location: Left arm, BP Patient Position: At rest) Pulse 77 Temp 97.1 °F (36.2 °C) Resp 24 Ht 5' 9\" (1.753 m) Wt 80 kg (176 lb 6.4 oz) SpO2 99% BMI 26.05 kg/m² ROS: 12 point ROS obtained in details. Pertinent positives as mentioned in HPI,  
otherwise negative Physical Exam: 
 
 
General:  Alert, cooperative, no distress, appears stated age. Head:  Normocephalic, without obvious abnormality, atraumatic. Eyes:  Conjunctivae/corneas clear. Nose: Nares normal. Septum midline. Mucosa dry. No drainage or sinus tenderness. Throat: Lips, mucosa, and tongue normal. Teeth and gums normal.  
Neck: Supple, symmetrical, trachea midline Back:   Symmetric, no curvature. ROM normal.  
Lungs:    No resp distress, equal chest rise. Chest wall:  No tenderness or deformity. Heart:  Regular rate and rhythm, S1, S2 normal, no click, rub or gallop. Abdomen:   Soft,  non-tender. Bowel sounds normal. Umbilical hernia present, small and reducible Extremities: Extremities normal, atraumatic,no  edema Skin: Skin color, texture, turgor normal. No rashes or lesions. Neurologic: Grossly nonfocal  
  
 
 
Labs: Results:  
Chemistry Recent Labs 10/29/20 
0245 10/28/20 
2205 10/28/20 
1422 10/28/20 
0450  10/27/20 
1040 *  --   --  127*  --  101*  104* * 125* 125* 122*   < > 120*  121*  
K 4.9  --   --  4.6  --  4.9  4.9 CL 90*  --   --  87*  --  86*  86* CO2 27  --   --  26  --  26  27 BUN 24*  --   --  25*  --  25*  26* CREA 1.12  --   --  1.24  --  1.18  1.24  
CA 8.6  --   --  8.3*  --  8.4*  8.6 AGAP 5  --   --  9  --  8  8 BUCR 21*  --   --  20  --  21*  21* AP  --   --   --   --   --  72  
 TP  --   --   --   --   --  6.8 ALB  --   --   --   --   --  3.1*  3.1*  
GLOB  --   --   --   --   --  3.7 AGRAT  --   --   --   --   --  0.8  
 < > = values in this interval not displayed. CBC w/Diff Recent Labs 10/29/20 
0245 10/28/20 
6685 10/27/20 
8609 WBC 7.5 8.1 10.9 RBC 3.96* 3.81* 3.64* HGB 12.3* 11.8* 11.3* HCT 34.9* 33.0* 31.3*  
 279 263 GRANS 86* 87* 88* LYMPH 6* 9* 5* EOS 0 0 0 Microbiology Recent Labs 10/27/20 
1730 10/26/20 
1730 CULT LIGHT NORMAL RESPIRATORY HAY  FEW YEAST, (APPARENT CANDIDA ALBICANS)* MRSA NOT PRESENT      Screening of patient nares for MRSA is for surveillance purposes and, if positive, to facilitate isolation considerations in high risk settings. It is not intended for automatic decolonization interventions per se as regimens are not sufficiently effective to warrant routine use. RADIOLOGY: 
 
All available imaging studies/reports in [facility] for this admission were reviewed Dr. Manuela Boxer, Infectious Disease Specialist 
[phone] October 29, 2020 
10:29 AM

## 2020-10-29 NOTE — PROGRESS NOTES
[facility]  -  Progress Note Patient: Vianey Sapp MRN: [id number]  SSN: [id number] YOB: 1960  Age: 63 y.o. Sex: male Admit Date: 10/25/2020 Assessment:  
 
-Acute systolic heart failure, new diagnosis -New cardiomyopathy with EF 25% by echo 10/28/20 
-Echo 10/28/20, cannot exclude mitral valve mass although given clinical scenario, low suspicion for endocarditis, follow-up blood culttures, ID following 
-Presented due to dyspnea, atypical chest pain. Both improved. -Bilateral infiltrates by chest CTA 10/26/2020, no PE  
-Hyponatremia, Na ~120 this admission, nephrology consulted. Possible polydipsia vs CHF as cause 
-Hx HTN 
-Hx tobacco abuse, reports plan to quit once discharged -Schizophrenia 
  
No primary cardiologist. 
 
Plan:  
 
Independently seen and evaluated. Agree with below. Patient with unexplained cardiomyopathy. We will proceed with coronary angiography as outlined below. All questions answered. He agrees with the plan. -Cardiac catheterization discussed, to include details of procedure, risks/benefits, alternatives. He is in agreement with proceeding, has been NPO since midnight. 
-Continued on Coreg, Hydralazine. Lisinopril remains on hold, resume when okay by nephrology team. 
-Will need maintenance diuretics at time of discharge. -Nephrology following, appreciate assistance. -ID following, appreciate assistance. Subjective: No new complaints. Objective:  
  
Patient Vitals for the past 8 hrs: 
 Temp Pulse Resp BP SpO2  
10/29/20 0841  60 20 (!) 134/94 100 % 10/29/20 0834  60 20 (!) 137/90 100 % 10/29/20 0815  66 18 (!) 151/90 100 % 10/29/20 0719 98.4 °F (36.9 °C) 69 20 130/86 97 % 10/29/20 0240 98.1 °F (36.7 °C) 73 20 122/81 98 % Patient Vitals for the past 96 hrs: 
 Weight 10/29/20 0636 80 kg (176 lb 6.4 oz) 10/27/20 1623 88.5 kg (195 lb) 10/25/20 2030 88.6 kg (195 lb 6.4 oz) Intake/Output Summary (Last 24 hours) at 10/29/2020 1005 Last data filed at 10/29/2020 6989 Gross per 24 hour Intake 340 ml Output 1800 ml Net -1460 ml Physical Exam: 
General:  alert, cooperative, no distress, appears stated age Neck:  supple Lungs:  clear to auscultation bilaterally Heart:  Regular rate and rhythm Abdomen:  abdomen is soft without significant tenderness, masses, organomegaly or guarding Extremities:  Atraumatic, no edema Data Review:  
 
Labs: Results:  
   
Chemistry Recent Labs 10/29/20 
0245 10/28/20 
2205 10/28/20 
1422 10/28/20 
0450  10/27/20 
1040 *  --   --  127*  --  101*  104* * 125* 125* 122*   < > 120*  121*  
K 4.9  --   --  4.6  --  4.9  4.9 CL 90*  --   --  87*  --  86*  86* CO2 27  --   --  26  --  26  27 BUN 24*  --   --  25*  --  25*  26* CREA 1.12  --   --  1.24  --  1.18  1.24  
CA 8.6  --   --  8.3*  --  8.4*  8.6 MG 2.6  --   --  2.3  --  1.9 PHOS 3.7  --   --  4.6  --  4.7  4.7 AGAP 5  --   --  9  --  8  8 BUCR 21*  --   --  20  --  21*  21* AP  --   --   --   --   --  72  
TP  --   --   --   --   --  6.8 ALB  --   --   --   --   --  3.1*  3.1*  
GLOB  --   --   --   --   --  3.7 AGRAT  --   --   --   --   --  0.8  
 < > = values in this interval not displayed. CBC w/Diff Recent Labs 10/29/20 
0245 10/28/20 
8967 10/27/20 
9003 WBC 7.5 8.1 10.9 RBC 3.96* 3.81* 3.64* HGB 12.3* 11.8* 11.3* HCT 34.9* 33.0* 31.3*  
 279 263 GRANS 86* 87* 88* LYMPH 6* 9* 5* EOS 0 0 0 Liver Enzymes Recent Labs 10/27/20 
1040 TP 6.8 ALB 3.1*  3.1* AP 72 Thyroid Studies Lab Results Component Value Date/Time  TSH 0.69 10/27/2020 10:40 AM

## 2020-10-29 NOTE — PROGRESS NOTES
Received report on pt.from off going RN. Pt being transported to cath lab at report time. Denies c/o pain or SOB at this time. No acute distress noted. Ailyn Kim 1330 TRANSFER - IN REPORT: 
 
Verbal report received from [facility] RN(name) on Tariq Jewell  being received from cath holding(unit) for routine progression of care Report consisted of patients Situation, Background, Assessment and  
Recommendations(SBAR). Information from the following report(s) SBAR was reviewed with the receiving nurse. Opportunity for questions and clarification was provided. 1430 returned to room from cath lab. Dressing to rt wrist dry and intact. Alert and oriented . Denies pain. Bed alarm on. Call bell at side. Reminded pt not to use rt hand/wrist to push self up off of bed or put pressure on it. Moves hand and fingers well. Good color and sensation present. 1600 rt wrist dressing remains dry and intact. No bleeding or hematoma. Assessment unchanged from previous assessment. 1700 sitting up in bed eating. Denies distress. No bleeding/hematoma at rt wrist cath site. Dressing dry and intact. 1950 Bedside and Verbal shift change report given to Janae Brice (oncoming nurse) by Jarad Chi RN (offgoing nurse). Report given with SBAR, Clara and MAR.

## 2020-10-29 NOTE — PROGRESS NOTES
TRANSFER - OUT REPORT: 
 
Verbal report given to TIN Hidalgo(name) on Jero Carnes  being transferred to (unit) for routine progression of care Report consisted of patients Situation, Background, Assessment and  
Recommendations(SBAR). Information from the following report(s) SBAR, Procedure Summary, MAR and Recent Results was reviewed with the receiving nurse. Lines:  
Peripheral IV 10/26/20 Right;Upper Arm (Active) Site Assessment Clean, dry, & intact 10/28/20 2009 Phlebitis Assessment 0 10/28/20 2009 Infiltration Assessment 0 10/28/20 2009 Dressing Status Clean, dry, & intact 10/28/20 2009 Dressing Type Tape;Transparent 10/28/20 2009 Hub Color/Line Status Pink;Flushed 10/28/20 2009 Action Taken Open ports on tubing capped 10/28/20 1014 Alcohol Cap Used Yes 10/28/20 2009 Peripheral IV 10/28/20 Anterior;Left;Proximal Forearm (Active) Opportunity for questions and clarification was provided. Patient transported with: 
 [employer]

## 2020-10-29 NOTE — ROUTINE PROCESS
TRANSFER - IN REPORT: 
 
Verbal report received from Dayana Matamoros RN(name) on Lemuel Batista  being received from [address] [place] [address](unit) for ordered procedure Report consisted of patients Situation, Background, Assessment and  
Recommendations(SBAR). Information from the following report(s) SBAR, ED Summary, Intake/Output, MAR, Recent Results, Pre Procedure Checklist, Procedure Verification, Quality Measures and Dual Neuro Assessment was reviewed with the receiving nurse. Opportunity for questions and clarification was provided. Assessment completed upon patients arrival to unit and care assumed.

## 2020-10-29 NOTE — PROGRESS NOTES
1142 TRANSFER - OUT REPORT: 
 
Verbal report given to Sanjana Abrams (name) on Catarino Nix  being transferred to Cath Lab (unit) for routine post - op Report consisted of patients Situation, Background, Assessment and  
Recommendations(SBAR). Information from the following report(s) SBAR, Kardex, Procedure Summary and MAR was reviewed with the receiving nurse. Lines:  
Peripheral IV 10/26/20 Right;Upper Arm (Active) Site Assessment Clean, dry, & intact 10/28/20 2009 Phlebitis Assessment 0 10/28/20 2009 Infiltration Assessment 0 10/28/20 2009 Dressing Status Clean, dry, & intact 10/28/20 2009 Dressing Type Tape;Transparent 10/28/20 2009 Hub Color/Line Status Pink;Flushed 10/28/20 2009 Action Taken Open ports on tubing capped 10/28/20 1014 Alcohol Cap Used Yes 10/28/20 2009 Peripheral IV 10/28/20 Anterior;Left;Proximal Forearm (Active) Opportunity for questions and clarification was provided. Patient transported with: 
 [employer]

## 2020-10-29 NOTE — PROGRESS NOTES
TRANSFER - OUT REPORT: 
 
Verbal report given to ROD Schilling(name) on [facility]  being transferred to Cath Lab(unit) for ordered procedure Report consisted of patients Situation, Background, Assessment and  
Recommendations(SBAR). Information from the following report(s) SBAR, MAR, Accordion, Recent Results, Pre Procedure Checklist, Procedure Verification, Quality Measures and Dual Neuro Assessment was reviewed with the receiving nurse. Lines:  
Peripheral IV 10/26/20 Right;Upper Arm (Active) Site Assessment Clean, dry, & intact 10/28/20 2009 Phlebitis Assessment 0 10/28/20 2009 Infiltration Assessment 0 10/28/20 2009 Dressing Status Clean, dry, & intact 10/28/20 2009 Dressing Type Tape;Transparent 10/28/20 2009 Hub Color/Line Status Pink;Flushed 10/28/20 2009 Action Taken Open ports on tubing capped 10/28/20 1014 Alcohol Cap Used Yes 10/28/20 2009 Peripheral IV 10/28/20 Anterior;Left;Proximal Forearm (Active) Opportunity for questions and clarification was provided. Patient transported with: 
 [employer]

## 2020-10-29 NOTE — PROGRESS NOTES
In Patient Progress note Admit Date: 10/25/2020 Impression: #1 hypotonic hyponatremia, likely acute on chronic. Etiology  ,  
CHF v/s Polydipsia v/s SIADH , patients urine osm and urine sodium more indicative of Polydipsia /SIADH . Improving #2 mild YAIMA, improved #3 acute hypoxic respiratory failure #4 CT with bilateral infiltrates pneumonia vers 
 atypical edema #5 dilated nonischemic cardiomyopathy #6 CT indicative of pulmonary hypertension #6 history of smoker #7 uncontrolled hypertension #8 diagnosis of schizophrenia, concern for question polydipsia #9 elevated troponins #10 normal liver appearance on RUQ US, LFT wnl  
#11 concern for mitral valve vegetation on TTE , WADE scheduled for tomorrow Plan: 
  
#1 fluid restrict 800 ml #2 follow sodium daily #3 diuretics held for cath today , restart diuretics tomorrow #4 renal panel in AM 
  
  
Please call with questions, 
  
Hermila Singh MD FASN Cell [phone] Pager: [phone] Subjective:  
 
Denies SOB/CP Current Facility-Administered Medications:  
  sodium chloride (NS) flush 5-40 mL, 5-40 mL, IntraVENous, Q8H, Gwyn Clement MD, 10 mL at 10/29/20 1704   sodium chloride (NS) flush 5-40 mL, 5-40 mL, IntraVENous, PRN, Gwyn Quevedo MD 
  ipratropium-albuterol (COMBIVENT RESPIMAT) 20 mcg-100 mcg inhalation spray, 2 Puff, Inhalation, Q6H PRN, Jaxon Lynne MD 
  carvediloL (COREG) tablet 12.5 mg, 12.5 mg, Oral, BID WITH MEALS, [place], [place], 12.5 mg at 10/29/20 1706   hydrALAZINE (APRESOLINE) tablet 50 mg, 50 mg, Oral, TID, Mir Taylor MD, 50 mg at 10/29/20 1706   acetaminophen (TYLENOL) tablet 650 mg, 650 mg, Oral, Q6H PRN **OR** acetaminophen (TYLENOL) suppository 650 mg, 650 mg, Rectal, Q6H PRN, Dora Rosario MD 
  polyethylene glycol (MIRALAX) packet 17 g, 17 g, Oral, DAILY PRN, Dora Rosario MD 
  enoxaparin (LOVENOX) injection 40 mg, 40 mg, SubCUTAneous, DAILY, Mary Metcalf MD, 40 mg at 10/29/20 1436   thiamine (B-1) 200 mg in 0.9% sodium chloride 100 mL IVPB, 200 mg, IntraVENous, DAILY, Mary Beth Whitmore PA-C, 200 mg at 10/29/20 1450   folic acid (FOLVITE) 1 mg in 0.9% sodium chloride 50 mL ivpb, , IntraVENous, DAILY, Mary Beth Whitmore PA-C 
  [Held by provider] lisinopriL (PRINIVIL, ZESTRIL) tablet 40 mg, 40 mg, Oral, DAILY, Mary Metcalf MD, 40 mg at 10/26/20 1454   hydrALAZINE (APRESOLINE) 20 mg/mL injection 10 mg, 10 mg, IntraVENous, Q6H PRN, Mary Metcalf MD, 10 mg at 10/27/20 0044 
  haloperidoL (HALDOL) tablet 10 mg, 10 mg, Oral, DAILY, Esther Lynne MD, 10 mg at 10/29/20 1437   pantoprazole (PROTONIX) tablet 40 mg, 40 mg, Oral, DAILY, Esther Lynne MD, 40 mg at 10/29/20 1437   trimethobenzamide (TIGAN) injection 200 mg, 200 mg, IntraMUSCular, Q6H PRN, Sam Davis DO 
 
 
 
Objective:  
 
Visit Vitals /88 (BP 1 Location: Left arm, BP Patient Position: At rest) Pulse 77 Temp 97.1 °F (36.2 °C) Resp 24 Ht 5' 9\" (1.753 m) Wt 80 kg (176 lb 6.4 oz) SpO2 99% BMI 26.05 kg/m² Intake/Output Summary (Last 24 hours) at 10/29/2020 1757 Last data filed at 10/29/2020 7237 Gross per 24 hour Intake 220 ml Output 1200 ml Net -980 ml Physical Exam:  
 
Patient is in no apparent distress. HEENT: mmm Lungs: good air entry, clear to auscultation bilaterally. Cardiovascular system: S1, S2, regular rate and rhythm. Abdomen: soft, non tender, non distended. BS+ Extremities: no clubbing, cyanosis or edema. Neurologic: Alert, oriented X three. Data Review: 
 
Recent Labs 10/29/20 
0245 WBC 7.5  
RBC 3.96* HCT 34.9*  
MCV 88.1 MCH 31.1 MCHC 35.2 RDW 13.5 Recent Labs 10/29/20 
1602 10/29/20 
0245 10/28/20 
2205 10/28/20 
1422 10/28/20 
0450 10/27/20 
1920 10/27/20 
1100 10/27/20 
1040 BUN  --  24*  --   --  25*  --   --  25*  26* CREA  --  1.12  --   --  1.24  --   --  1.18  1.24  
CA  --  8.6  --   --  8.3*  --   --  8.4*  8.6 ALB  --   --   --   --   --   --   --  3.1*  3.1* K  --  4.9  --   --  4.6  --   --  4.9  4.9 * 122* 125* 125* 122* 122* 121* 120*  121* CL  --  90*  --   --  87*  --   --  86*  86* CO2  --  27  --   --  26  --   --  26  27 PHOS  --  3.7  --   --  4.6  --   --  4.7  4.7 GLU  --  117*  --   --  127*  --   --  101*  104* Brittney Tony MD

## 2020-10-29 NOTE — PROGRESS NOTES
DSTAT removed per hospital policy. No bleeding, no hematoma noted at site. Hemostatic dressing applied with wrist splint. Patient tolerated. Instructed on importance of keeping wrist in neutral position, patient verbalized understanding.

## 2020-10-29 NOTE — PROGRESS NOTES
[address] [facility] Progress Note Patient: Attila Kruse MRN: [id number] SSN: [id number]  YOB: 1960 Age: 63 y.o. Sex: male Admit Date: 10/25/2020 LOS: 3 days Chief Complaint Patient presents with  Shortness of Breath Subjective:  
 
Patient was seen at bedside, no acute events overnight. Patient was resting comfortably with no complaints. He denies SOB, dyspnea, or cough and has not required supplemental oxygen. He has been NPO overnight for planned cardiac cath today. Review of Systems Constitutional: Negative for diaphoresis and malaise/fatigue. Respiratory: Negative for cough, shortness of breath and wheezing. Cardiovascular: Negative for chest pain, palpitations and leg swelling. Gastrointestinal: Negative for abdominal pain. Skin: Negative for itching. Neurological: Negative for focal weakness and headaches. Objective:  
 
Visit Vitals /86 (BP 1 Location: Left arm, BP Patient Position: At rest) Pulse 69 Temp 98.4 °F (36.9 °C) Resp 20 Ht 5' 9\" (1.753 m) Wt 80 kg (176 lb 6.4 oz) SpO2 97% BMI 26.05 kg/m² Physical Exam:  
General:  Alert and Responsive and in No acute distress. HEENT: sclera anicteric. Moist mucous membranes. No other gross abnormalities appreciated. CV:  RRR w/o MRGs. No visible pulsations or thrills. No JVD RESP:  Unlabored breathing. Lungs clear to auscultation. No wheeze, rales, or rhonchi. Equal expansion bilaterally. ABD:  Soft, nontender, nondistended. No hepatosplenomegaly. No suprapubic tenderness. MS:  No atrophy. 5/5 muscle strength b/l upper and lower extremities. Ext:  No edema. 2+ radial and dp pulses bilaterally. Skin:  No rashes, lesions, or ulcers. Good turgor. Intake and Output: 
Current Shift: No intake/output data recorded. Last three shifts: 10/27 1901 - 10/29 0700 In: 390 [P.O.:240; I.V.:150] Out: 3950 [XOBIT:[id number]] Lab/Data Review: Recent Results (from the past 12 hour(s)) SODIUM Collection Time: 10/28/20 10:05 PM  
Result Value Ref Range Sodium 125 (L) 136 - 145 mmol/L MAGNESIUM Collection Time: 10/29/20  2:45 AM  
Result Value Ref Range Magnesium 2.6 1.6 - 2.6 mg/dL PHOSPHORUS Collection Time: 10/29/20  2:45 AM  
Result Value Ref Range Phosphorus 3.7 2.5 - 4.9 MG/DL  
CBC WITH AUTOMATED DIFF Collection Time: 10/29/20  2:45 AM  
Result Value Ref Range WBC 7.5 4.6 - 13.2 K/uL  
 RBC 3.96 (L) 4.70 - 5.50 M/uL  
 HGB 12.3 (L) 13.0 - 16.0 g/dL HCT 34.9 (L) 36.0 - 48.0 % MCV 88.1 74.0 - 97.0 FL  
 MCH 31.1 24.0 - 34.0 PG  
 MCHC 35.2 31.0 - 37.0 g/dL  
 RDW 13.5 11.6 - 14.5 % PLATELET 506 816 - 645 K/uL MPV 9.8 9.2 - 11.8 FL  
 NEUTROPHILS 86 (H) 40 - 73 % LYMPHOCYTES 6 (L) 21 - 52 % MONOCYTES 8 3 - 10 % EOSINOPHILS 0 0 - 5 % BASOPHILS 0 0 - 2 %  
 ABS. NEUTROPHILS 6.5 1.8 - 8.0 K/UL  
 ABS. LYMPHOCYTES 0.4 (L) 0.9 - 3.6 K/UL  
 ABS. MONOCYTES 0.6 0.05 - 1.2 K/UL  
 ABS. EOSINOPHILS 0.0 0.0 - 0.4 K/UL  
 ABS. BASOPHILS 0.0 0.0 - 0.1 K/UL  
 DF AUTOMATED METABOLIC PANEL, BASIC Collection Time: 10/29/20  2:45 AM  
Result Value Ref Range Sodium 122 (L) 136 - 145 mmol/L Potassium 4.9 3.5 - 5.5 mmol/L Chloride 90 (L) 100 - 111 mmol/L  
 CO2 27 21 - 32 mmol/L Anion gap 5 3.0 - 18 mmol/L Glucose 117 (H) 74 - 99 mg/dL BUN 24 (H) 7.0 - 18 MG/DL Creatinine 1.12 0.6 - 1.3 MG/DL  
 BUN/Creatinine ratio 21 (H) 12 - 20 GFR est AA >60 >60 ml/min/1.73m2 GFR est non-AA >60 >60 ml/min/1.73m2 Calcium 8.6 8.5 - 10.1 MG/DL  
EKG, 12 LEAD, SUBSEQUENT Collection Time: 10/29/20  6:53 AM  
Result Value Ref Range Ventricular Rate 64 BPM  
 Atrial Rate 64 BPM  
 P-R Interval 148 ms QRS Duration 98 ms Q-T Interval 486 ms QTC Calculation (Bezet) 501 ms Calculated P Axis 33 degrees Calculated R Axis -56 degrees Calculated T Axis -168 degrees Diagnosis Normal sinus rhythm Possible Left atrial enlargement Left anterior fascicular block Left ventricular hypertrophy ST & T wave abnormality, consider anterolateral ischemia Prolonged QT Abnormal ECG When compared with ECG of 28-OCT-2020 06:59, 
premature ventricular complexes are no longer present SARS-COV-2 Collection Time: 10/29/20  7:32 AM  
Result Value Ref Range Specimen source Nasopharyngeal    
 COVID-19 rapid test PENDING Specimen type NP Swab Telemetry Yes Oxygen NC 4L Assessment and Plan:  
63 y.o. male with PMH COPD, Schizophrenia, HTN, h/o anemia, LVH, QT prolongation, now presenting with complaint of Shortness of breath, hypoxemia. 
  
Acute Hypoxic Respiratory Failure. Echocardiogram done IP shows EF of 20-25%. This, combined with an increasing BNP, is indicative of Congestive heart failure, with reduced ejection fraction. Echo also showed potential mitral valve vegetation. 
-Pulm, cards, nephro, and ID consulted, their recs appreciated. -DC Ceftriaxone and azithromycin given CHF picture 
-Decadron until COVID (-) test result 
-combivent PRN 
-Lasix 20mg IV BID 
-Strict I/Os 
-Fluid restriction 1200ml - Cardiac cath procedure planned for today 
  
Hypervolemic hypotonic hyponatremia, Moderate, likely acute on chronic. 
-Moderate for now, slowly normalizing 
-Nephro consulted, appreciate their recs 
-BMP, mag, phos daily 
-Sodium q8h 
  
QT prolongation 
-Daily EKG 
-Careful with QT prolonging meds 
  
Schizophrenia 
-Continue home haloperidol 10mg every day. 
  
HTN 
-hold home meds 
-Hydralazine and metoprolol 
  
Elevated troponins 
-likely secondary to demand ischemia 
-daily ekg  
  
  
Diet DIET NPO  
DVT Prophylaxis Lovenox GI Prophylaxis Protonix Code status Full Disposition 2-3 MN  
  
Point of Contact N/a Ricardo Guo MD, PGY-1  
[facility] [place] Medicine Intern Pager: [phone] October 29, 2020, 8:44 AM

## 2020-10-29 NOTE — PROGRESS NOTES
[address] [facility] Brief Progress Note, Post procedure check SUBJECTIVE Pt seen at bedside. Patient feeling well and without any pains at the moment. He was inquiring as to when he was likely to be discharged and if he would be getting medication that would help and prevent the shortness of breath from occurring again. Pt denies headaches, fevers/chills, CP/SOB, abdominal pain, N/V, dysuria/hesitancy, and diarrhea/constipation. OBJECTIVE Visit Vitals /88 (BP 1 Location: Left arm, BP Patient Position: At rest) Pulse 77 Temp 97.1 °F (36.2 °C) Resp 24 Ht 5' 9\" (1.753 m) Wt 80 kg (176 lb 6.4 oz) SpO2 99% BMI 26.05 kg/m² Recent Results (from the past 12 hour(s)) EKG, 12 LEAD, SUBSEQUENT Collection Time: 10/29/20  6:53 AM  
Result Value Ref Range Ventricular Rate 64 BPM  
 Atrial Rate 64 BPM  
 P-R Interval 148 ms QRS Duration 98 ms Q-T Interval 486 ms QTC Calculation (Bezet) 501 ms Calculated P Axis 33 degrees Calculated R Axis -56 degrees Calculated T Axis -168 degrees Diagnosis Normal sinus rhythm Possible Left atrial enlargement Left anterior fascicular block Left ventricular hypertrophy ST & T wave abnormality, consider anterolateral ischemia Prolonged QT Abnormal ECG When compared with ECG of 28-OCT-2020 06:59, 
premature ventricular complexes are no longer present Confirmed by Harper Mcfarland M.D., [address] [place] [address] ([id number]) on 10/29/2020 9:07:13 AM 
  
SARS-COV-2 Collection Time: 10/29/20  7:32 AM  
Result Value Ref Range Specimen source Nasopharyngeal    
 COVID-19 rapid test Not detected NOTD Specimen type NP Swab Physical examination Consitutional: NAD, AAO Cardiovascular: RRR Respiratory: CTA b/l; good respiratory effort Abdominal: Abdomen soft, NT/ND Extremities: No edema, no cyanosis ASSESSMENT/PLAN 
VSS. No change in management at this time. Cristopher Justin MD, PGY-1  
[facility] Medicine Intern Pager: [phone] October 29, 2020, 4:22 PM

## 2020-10-29 NOTE — ROUTINE PROCESS
Verbal shift change report given to  [facility] RN (oncoming nurse) by Jean Marie Matthews (offgoing nurse). Report included the following information SBAR, Kardex, MAR, Recent Results and Cardiac Rhythm SR. Patient awake and NPO, call light, urinal and bed alarm on. 
 
3204 Cath lab call for report, patient

## 2020-10-30 NOTE — PROGRESS NOTES
Problem: Dysphagia (Adult) Goal: *Acute Goals and Plan of Care (Insert Text) Outcome: Resolved/Met SPEECH LANGUAGE PATHOLOGY BEDSIDE SWALLOW EVALUATION AND DISCHARGE Patient: Vianey Sapp [de-identified]63 y.o. male) Date: 10/30/2020 Primary Diagnosis: Pneumonia due to COVID-19 virus [U07.1, J12.89] Procedure(s) (LRB): LEFT HEART CATH (Right) Left Ventriculography (Right) Coronary Angiography (Right) 1 Day Post-Op Precautions: poor safety awareness. PLOF: per H&P 
 
ASSESSMENT : 
Clinical beside swallow eval completed per MD orders. Pt A&Ox self and place. Functional communication. Intelligibility >90%. OM examination revealed oral motor structures functional for mastication and deglutition. Presented with thin liquid, puree, and solid trials. Exhibited adequate bolus cohesion, manipulation and A-P transit. Further exhibited efficient swallow timing/reflex and hyolaryngeal excursion. Pt able to manipulate and clear with 0 clinical s/s aspiration and/or oropharyngeal dysphagia. Pt safe for Regular  solid, thin liquid diet. 0 formal ST needs for dysphagia indicated at this time. SLP educated pt on role of speech therapist in current setting with re: speech/swallow; verbalized comprehension. SLP available for re-evaluation if indicated by MD. Results d/w RN, Raysa Helms and Dr Fide Garsia (who was at bedside) Thank you for this referral.  
Jd Oliver, SLP 
MA, CCC-SLP Speech-Language Pathologist 
  
 
PLAN : 
Recommendations and Planned Interventions: 
No formal ST needs ID'd for dysphagia. Eval only. Discharge Recommendations: None SUBJECTIVE:  
Patient stated I'm going home todday! . OBJECTIVE:  
 
Past Medical History:  
Diagnosis Date Hypertension Psychiatric diagnosis History reviewed. No pertinent surgical history. Home Situation:  
Home Situation Home Environment: Private residence One/Two Story Residence: Two story Living Alone: No 
 Support Systems: Parent, Friends \ neighbors Patient Expects to be Discharged to[de-identified] Private residence Current DME Used/Available at Home: None Diet prior to admission: Regular with thin liquids Current Diet:  Regular with thin liquids Cognitive and Communication Status: 
Neurologic State: Agitated, Alert Orientation Level: Disoriented to situation, Oriented to person, Oriented to place Cognition: Decreased attention/concentration, Impaired decision making, Poor safety awareness Oral Assessment: 
Oral Assessment Labial: No impairment Dentition: Limited;Natural 
Oral Hygiene: fair Lingual: No impairment P.O. Trials: 
Patient Position: > 45 HOB Vocal quality prior to P.O.: No impairment Consistency Presented: Puree; Solid; Thin liquid How Presented: Self-fed/presented;Spoon;Straw;Successive swallows Bolus Acceptance: No impairment Bolus Formation/Control: No impairment Propulsion: No impairment Oral Residue: None Initiation of Swallow: No impairment Laryngeal Elevation: Functional 
Aspiration Signs/Symptoms: None Pharyngeal Phase Characteristics: No impairment, issues, or problems Effective Modifications: Alternate liquids/solids Cues for Modifications: Minimal 
  
 
Oral Phase Severity: No impairment Pharyngeal Phase Severity : No impairment PAIN: 
Pain level pre-treatment: 0/10 Pain level post-treatment: 0/10 Pain Intervention(s): Medication (see MAR); Rest, Ice, Repositioning Response to intervention: Nurse notified, See doc flow After evaluation:  
[]            Patient left in no apparent distress sitting up in chair 
[x]            Patient left in no apparent distress in bed 
[x]            Call bell left within reach [x]            Nursing notified []            Family present 
[]            Caregiver present 
[]            Bed alarm activated COMMUNICATION/EDUCATION:  
[x]            Aspiration precautions; swallow safety; compensatory techniques. [x]            Patient/family have participated as able in goal setting and plan of care. []            Patient/family agree to work toward stated goals and plan of care. []            Patient understands intent and goals of therapy; neutral about participation. []            Patient unable to participate in goal setting/plan of care; educ ongoing with interdisciplinary staff [x]         Posted safety precautions in patient's room. Thank you for this referral. 
AYALA Trimble MA, CCC-SLP Speech-Language Pathologist 
 
Time Calculation: 9 mins

## 2020-10-30 NOTE — DISCHARGE SUMMARY
[facility] Discharge Summary Patient: Alhaji Bailey MRN: [id number]  CSN: [id number] YOB: 1960  Age: 63 y.o. Sex: male Admission Date: 10/25/2020 Discharge Date: 10/30/20 Attending: David Zafar MD PCP: Guillaume Chakraborty MD  
 
=================================================================== Reason for Admission: Pneumonia due to COVID-19 virus [U07.1, J12.89] Discharge Diagnoses:  
Acute Hypoxic Respiratory Failure Acute CHF Hypovolemic Hypotonic Hyponatremia, moderate Important notes to PCP/ follow-up studies and evaluations Follow up ECHO, to be done outpatient Follow up sodium status outpatient. Follow up new medication additions and use/effect on patient's heart failure Pending labs and studies: 
ECHO Operative Procedures:  
Heart Cath Discharge Medications:    
Discharge Medication List as of 10/30/2020  5:54 PM  
  
START taking these medications Details  
furosemide (LASIX) 20 mg tablet Take 1 Tab by mouth daily. , Print, Disp-30 Tab,R-0  
  
carvediloL (COREG) 12.5 mg tablet Take 1 Tab by mouth two (2) times daily (with meals). , Print, Disp-60 Tab,R-0  
  
hydrALAZINE (APRESOLINE) 50 mg tablet Take 1 Tab by mouth three (3) times daily for 30 days. , Print, Disp-90 Tab,R-0 CONTINUE these medications which have NOT CHANGED Details Vitamin C 500 mg tablet Historical Med, SALVADOR  
  
ferrous sulfate (IRON) 325 mg (65 mg iron) EC tablet Historical Med  
  
haloperidoL (HALDOL) 10 mg tablet Historical Med  
  
omeprazole (PRILOSEC) 20 mg capsule Historical Med  
  
albuterol sulfate (PROAIR RESPICLICK) 90 mcg/actuation breath activated inhaler Take 2 Puffs by inhalation every four (4) hours as needed for Wheezing, Shortness of Breath or Cough., Normal, Disp-1 Inhaler,R-0  
  
inhalational spacing device 1 Each by Does Not Apply route as needed for Wheezing., Normal, Disp-1 Device,R-0  
  
  
 STOP taking these medications  
  
 amLODIPine (NORVASC) 10 mg tablet Comments:  
Reason for Stopping:   
   
 lisinopriL (PRINIVIL, ZESTRIL) 5 mg tablet Comments:  
Reason for Stopping:   
   
 magnesium oxide (MAG-OX) 400 mg tablet Comments:  
Reason for Stopping:   
   
 predniSONE (DELTASONE) 50 mg tablet Comments:  
Reason for Stopping:   
   
 potassium chloride SR (KLOR-CON 10) 10 mEq tablet Comments:  
Reason for Stopping:   
   
 cefdinir (OMNICEF) 300 mg capsule Comments:  
Reason for Stopping:   
   
 doxycycline (VIBRA-TABS) 100 mg tablet Comments:  
Reason for Stopping:   
   
  
 
 
Disposition: Home Consultants:   
Cardiology, Psychiatry, Nephrology, ID, Pulm Brief Hospital Course (including pertinent history and physical findings) Amparo Abreu is a 63 y.o. male with PMH COPD, Schizophrenia, HTN, h/o anemia, LVH, QT prolongation, presenting with complaint of Shortness of breath, hypoxemia. 
  
Patient initially seen in ED 10/21 and diagnosed as having pneumonia. He was prescribed antibiotics at this time and discharged. Patient filled the prescriptions, although it is somewhat unclear if he started to take them. His shortness of breath progressed until day of admission where he called EMS due to this shortness of breath. He was found to be hypoxemic and brought to the ED. 
  
In the ED he was Hypoxemic and placed on Oxygen and given duonebs. He was started on a broad workup, including imaging and labs. His imaging showed a somewhat mixed picture, with a CXR concerning for RLL pneumonia, and a CTA chest with ground-glass opacities in b/l UL, concerning for multifocal pneumonia, covid, or atypical edema. He was also coincidentally found to be hyponatremic, and with a BNP of >3000. With this mixed picture, he was started on broad-spectrum IV antibiotics, ID and pulm were consulted, and he was placed on precautions until a repeat COVID test resulted.  He was initially admitted to the hospitalist service. During the first day of admission he was found to have actually been seeing [facility] outpatient, and so a transfer to our team was arranged. A broad workup of his SOB eventually showed an acute heart failure with reduced ejection fraction. Cardiology arranged for a cath to assess if there was CAD cause for this. He was found to have no ischemic cause evidenced during his procedure. He was started on medical management for his acute heart failure, including fluid restriction and diuretics, and his breathing improved throughout his admission. He was found to also be hyponatremic at admission, and was worked up for this. The final etiology of the hyponatremia was not fully clarified, although there is likely some aspect of polydipsia present. He was slowly repleted using IVF, which was stopped and changed to diuretics and fluid restriction, which continued until discharge. His sodium improved a moderate amount during admission. 
  
 
CURRENT ADMISSION IMAGING RESULTS Cta Chest W Or W Wo Cont Result Date: 10/26/2020 IMPRESSION: 1. Study is limited by respiratory motion, but no definite pulmonary embolism. 2. Enlarged main pulmonary artery suggests pulmonary hypertension. 3. Small right and trace left pleural effusions. Mild overlying consolidation on the right may represent atelectasis or infiltrate. Patchy groundglass consolidations in the bilateral upper lobes may represent multifocal infection versus atypical edema. 4. Cholelithiasis. 5. Ascending aorta measures up to 4.5 cm. Descending aorta measures up to 4.0 cm. Thank you for enabling us to participate in the care of this patient. [address] Result Date: 10/29/2020 IMPRESSION: Cholelithiasis without secondary sonographic findings of acute cholecystitis. No sonographic findings of cirrhosis. Xr Chest [facility] Result Date: 10/29/2020 IMPRESSION: Unchanged cardiomegaly, without findings of decompensation. Improved lung aeration. Xr Chest [facility] Result Date: 10/25/2020 IMPRESSION: Opacities right lower lobe mildly worsened concerning for pneumonia. Possible underlying fibrosis. Edema less likely without vascular congestion. Cardiology Procedures/Testing: MODALITY RESULTS  
EKG Results for orders placed or performed during the hospital encounter of 10/25/20 EKG, 12 LEAD, INITIAL Result Value Ref Range Ventricular Rate 97 BPM  
 Atrial Rate 97 BPM  
 P-R Interval 150 ms QRS Duration 102 ms Q-T Interval 388 ms QTC Calculation (Bezet) 492 ms Calculated P Axis 46 degrees Calculated R Axis -48 degrees Calculated T Axis 86 degrees Diagnosis Normal sinus rhythm Possible Left atrial enlargement Left anterior fascicular block Left ventricular hypertrophy Septal infarct (cited on or before 21-OCT-2020) Abnormal ECG When compared with ECG of 21-OCT-2020 15:45, 
ST elevation now present in Anterior leads T wave inversion no longer evident in Inferior leads T wave inversion less evident in Anterolateral leads QT has shortened Confirmed by Alexander Mckinnon ([id number]) on 10/26/2020 6:55:00 AM 
  
   
ECHO 10/25/20 ECHO ADULT FOLLOW-UP OR LIMITED 10/27/2020 10/27/2020 Narrative · LV: Estimated LVEF is 20 - 25%. Dilated left ventricle. Moderately  
reduced systolic function. · MV: Mitral valve non-specific thickening. Possible small mobile mass on  
anterior leaflet, 5 x 5 mm, cannot exclude vegetation. Signed by: Cristy Watson MD  
  
Nuclear Medicine No results found for this or any previous visit. IR No results found for this or any previous visit. CATH 10/25/20 CARDIAC PROCEDURE 10/29/2020 10/29/2020 Narrative · Dilated cardiomyopathy with EF 30%. Global hypokinesis. · Codominant epicardial coronary circulation, widely patent. · No further coronary evaluation is needed. He has nonischemic dilated  
cardiomyopathy. Signed by: Kendall Harvey MD  
  
 
Special Testing/Procedures: MODALITY RESULTS MICRO All Micro Results Procedure Component Value Units Date/Time CULTURE, BLOOD [[id number]] Collected:  10/25/20 2250 Order Status:  Completed Specimen:  Blood Updated:  11/01/20 0512 Special Requests: NO SPECIAL REQUESTS Culture result: NO GROWTH 6 DAYS     
 CULTURE, BLOOD [[id number]] Collected:  10/25/20 2020 Order Status:  Completed Specimen:  Blood Updated:  10/31/20 2409 Special Requests: NO SPECIAL REQUESTS Culture result: NO GROWTH 6 DAYS     
 CULTURE, RESPIRATORY/SPUTUM/BRONCH Lennice Nissen STAIN [[id number]]  (Abnormal) Collected:  10/27/20 1730 Order Status:  Completed Specimen:  Sputum Updated:  10/29/20 1103 Special Requests: NO SPECIAL REQUESTS     
  GRAM STAIN FEW WBCS SEEN     
      
  RARE EPITHELIAL CELLS SEEN  
     
   NO ORGANISMS SEEN Culture result:    
  LIGHT NORMAL RESPIRATORY HAY  
     
      
  FEW YEAST, (APPARENT CANDIDA ALBICANS) CULTURE, MRSA [[id number]] Collected:  10/26/20 1730 Order Status:  Completed Specimen:  Nasal from Nares Updated:  10/28/20 1846 Special Requests: NO SPECIAL REQUESTS Culture result: MRSA NOT PRESENT Screening of patient nares for MRSA is for surveillance purposes and, if positive, to facilitate isolation considerations in high risk settings. It is not intended for automatic decolonization interventions per se as regimens are not sufficiently effective to warrant routine use. RESPIRATORY PANEL,PCR,NASOPHARYNGEAL [[id number]] Collected:  10/26/20 8256 Order Status:  Completed Specimen:  Nasopharyngeal Updated:  10/26/20 1714 Adenovirus Not detected Coronavirus 229E Not detected Coronavirus HKU1 Not detected Coronavirus CVNL63 Not detected Coronavirus OC43 Not detected Metapneumovirus Not detected Rhinovirus and Enterovirus Not detected Influenza A Not detected Influenza A, subtype H1 Not detected Influenza A, subtype H3 Not detected INFLUENZA A H1N1 PCR Not detected Influenza B Not detected Parainfluenza 1 Not detected Parainfluenza 2 Not detected Parainfluenza 3 Not detected Parainfluenza virus 4 Not detected RSV by PCR Not detected B. parapertussis, PCR Not detected Bordetella pertussis - PCR Not detected Chlamydophila pneumoniae DNA, QL, PCR Not detected Mycoplasma pneumoniae DNA, QL, PCR Not detected LEGIONELLA PNEUMOPHILA AG, URINE [[id number]] Collected:  10/26/20 1000 Order Status:  Completed Specimen:  Urine, random Updated:  10/26/20 1524 Legionella Ag, urine Negative STREP PNEUMO AG, URINE [[id number]] Collected:  10/26/20 1000 Order Status:  Completed Specimen:  Urine, random Updated:  10/26/20 1149 Strep pneumo Ag, urine Negative ABG Lab Results Component Value Date/Time pH (POC) 7.42 10/26/2020 12:00 AM  
 pCO2 (POC) 35.6 10/26/2020 12:00 AM  
 pO2 (POC) 55 (L) 10/26/2020 12:00 AM  
 HCO3 (POC) 23.2 10/26/2020 12:00 AM  
 FIO2 (POC) 24 10/26/2020 12:00 AM  
  
 
Laboratory Results: 
LABORATORY RESULTS  
HEMATOLOGY Lab Results Component Value Date/Time WBC 7.5 10/30/2020 12:24 AM  
 HGB 12.2 (L) 10/30/2020 12:24 AM  
 HCT 33.7 (L) 10/30/2020 12:24 AM  
 PLATELET 689 99/20/6238 12:24 AM  
 MCV 87.3 10/30/2020 12:24 AM  
   
CHEMISTRIES Lab Results Component Value Date/Time  Sodium 128 (L) 10/30/2020 10:15 AM  
 Potassium 4.6 10/30/2020 12:24 AM  
 Chloride 94 (L) 10/30/2020 12:24 AM  
 CO2 27 10/30/2020 12:24 AM  
 Anion gap 5 10/30/2020 12:24 AM  
 Glucose 98 10/30/2020 12:24 AM  
 BUN 28 (H) 10/30/2020 12:24 AM  
 Creatinine 1.13 10/30/2020 12:24 AM  
 BUN/Creatinine ratio 25 (H) 10/30/2020 12:24 AM  
 GFR est AA >60 10/30/2020 12:24 AM  
 GFR est non-AA >60 10/30/2020 12:24 AM  
 Calcium 8.3 (L) 10/30/2020 12:24 AM  
  
HEPATIC FUNCTION Lab Results Component Value Date/Time Albumin 3.1 (L) 10/27/2020 10:40 AM  
 Albumin 3.1 (L) 10/27/2020 10:40 AM  
 Bilirubin, total 0.4 10/27/2020 10:40 AM  
 Protein, total 6.8 10/27/2020 10:40 AM  
 Globulin 3.7 10/27/2020 10:40 AM  
 A-G Ratio 0.8 10/27/2020 10:40 AM  
 ALT (SGPT) 25 10/27/2020 10:40 AM  
 Alk. phosphatase 72 10/27/2020 10:40 AM  
   
LACTIC ACID No results found for: LAC CARDIAC PANEL Lab Results Component Value Date/Time  (H) 10/26/2020 11:36 AM  
 CK - MB 15.7 (H) 10/26/2020 11:36 AM  
 CK-MB Index 4.0 10/26/2020 11:36 AM  
 Troponin-I, QT 0.06 (H) 10/26/2020 11:36 AM  
  
NT-proBNP Lab Results Component Value Date/Time NT pro-BNP 5,093 (H) 10/27/2020 10:40 AM  
 NT pro-BNP 3,340 (H) 10/25/2020 08:20 PM  
 NT pro- 10/21/2020 10:23 AM  
  
THYROID Lab Results Component Value Date/Time TSH 0.69 10/27/2020 10:40 AM  
 T4, Free 1.2 10/27/2020 10:40 AM  
  
LIPID PANEL No results found for: CHOL, CHOLPOCT, CHOLX, CHLST, CHOLV, HDL, HDLPOC, HDLP, LDL, LDLCPOC, LDLC, DLDLP, VLDLC, VLDL, TGLX, TRIGL, TRIGP, TGLPOCT, CHHD, CHHDX RISK CALCULATORS: 
SCORE RESULT  
ASCVD The ASCVD Risk score (Eugene Porras, et al., 2013) failed to calculate for the following reasons: 
  ASCVD risk score not calculated [id number] HAS-BLED READMISSION RISK SCORE Low Risk   
      
 4 Total Score   
  
 4 Pt. Coverage (Medicare=5 , Medicaid, or Self-Pay=4) Criteria that do not apply:  
 Has Seen PCP in Last 6 Months (Yes=3, No=0) . Living with Significant Other. Assisted Living. LTAC. SNF. or  
Rehab Patient Length of Stay (>5 days = 3) IP Visits Last 12 Months (1-3=4, 4=9, >4=11) Charlson Comorbidity Score (Age + Comorbid Conditions) Functional status and cognitive function:   
Ambulates freely Status: alert, cooperative, no distress, appears stated age Condition: STABLE Disposition: Home Diet: General Diet Code status and advanced care plan: Full Point of Contact None given, Likely mother Patient Education:  Patient was educated on the following topics prior to discharge:Heart failure, hyponatremia Follow-up:  
Follow-up Information Follow up With Specialties Details Why Contact Info [facility]  On 11/13/2020 Dr. Zahida Navarro, rescheduled for 11:20am [address] [facility] 
[phone]  
  
 
 
================================================================= 
Luz Marina Snowden MD PGY-1  
[address] [facility] Pager: [phone] October 30, 2020, 3:08 PM

## 2020-10-30 NOTE — PROGRESS NOTES
[facility] Pulmonary, Critical Care, and Sleep Medicine Name: Bill Gaspar MRN: [id number] : 1960 Hospital: [facility] Date: 10/29/2020 Pulmonary F/U Consult Requesting MD: Marita Hines                                                  Reason for CC Consult: hypoxic resp failure IMPRESSION:  
· Acute Hypoxic resp failure currently on 4 L NC, paO2 in 50's on ABG 
· B/L infiltrates-likely secondary to pulmonary edema · Acute CHF exacerbation- elevated pro-BNP, b/l edema, pulm HTN seen on CT chest, increased abdominal swelling, JVD, LVH on EKG. TTE reveals severely reduced LVEF of 20-25% · Pulm HTN suspected per CT chest -- due to [facility] group 2 disease given severe cardiomyopathy · Nicotine dependence to cigarettes: 2 ppd smoker, 80 pack year history · Chronic hyponatremia- 120's, schizophrenia diagnosis per chart review, ?polydipsia vs ?cirrhosis ---RUQ US negative for cirrhosis · Elevated troponin RECOMMENDATIONS:  
Maintain net negative fluid balance as renal function tolerates. Diuresis per Cardiology and Nephrology Regimen of systolic heart failure per cardiology. Holding ACEi for cath. Agree with coreg ABx per ID Bronchodilators as needed Supplemental oxygen to maintain SpO2 >88% Please assess for home oxygen need prior to discharge Aggressive pulmonary toileting/bronchial hygiene Frequent incentive spirometry Aspiration precautions including elevating HOB >30deg PT/OT, OOB, ambulate with assistance as tolerated DVT ppx per primary service Smoking cessation counseling PFTs as outpatient Nutrition consult per primary service F/U in [facility] in 6-8 weeks We will sign off at this time Subjective/History:  
10/29/20 Patient seen and examined at bedside. Pt seen and examined at bedside. Denies any chest pain, nausea, vomiting, diarrhea.   Reports shortness of breath is improving. No sputum or hemoptysis. Patient had a cardiac cath today, shows nonischemic cardiomyopathy with patent coronaries. Int Hx: This patient has been seen and evaluated at the request of Dr. Carina Snowden for hypox resp failure. Patient is a 63 y.o. male w/ PMH of schizophrenia, tob abuse, and HTN per chart presenting to [facility] for complaints of shortness of breath, hypoxia. He is a pleasant man but history is limited. Patient states his symptoms started 4-5 days ago. He present to ED on 10/21 w/ complaints of cough, SOB, subjective fever. COVID (-) at that time. Today patient c/o chest tightness and SOB, especially with exertion. ROS (+) for loss of appetite, abdominal swelling, orthopnea. He is denying cough and fevers today. Denies HA, sore throat, anosmia, change in taste, n/v/d, abdominal pain, LE edema. States he does not take any medications at home aside from what was prescribed last ED visit. Chart review shows he may have psych meds but unable to see in chart. Denies ETOH abuse but appears tremulous. Smokes 2 PPD since 25 yo. Denies drug abuse. 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 Denies asbestos exposure. Past Medical History:  
Diagnosis Date  Hypertension  Psychiatric diagnosis History reviewed. No pertinent surgical history. Prior to Admission medications Medication Sig Start Date End Date Taking? Authorizing Provider  
lisinopriL (PRINIVIL, ZESTRIL) 5 mg tablet Take  by mouth.  3/13/20  Yes Provider, Historical  
amLODIPine (NORVASC) 10 mg tablet 10 mg. 9/19/20   Provider, Historical  
Vitamin C 500 mg tablet  8/15/20   Provider, Historical  
ferrous sulfate (IRON) 325 mg (65 mg iron) EC tablet  9/19/20   Provider, Historical  
haloperidoL (HALDOL) 10 mg tablet  9/19/20   Provider, Historical  
omeprazole (PRILOSEC) 20 mg capsule  8/15/20   Provider, Historical  
 cefdinir (OMNICEF) 300 mg capsule Take 1 Cap by mouth two (2) times a day for 7 days. 10/21/20 10/28/20  Ness Brand, DO  
doxycycline (VIBRA-TABS) 100 mg tablet Take 1 Tab by mouth two (2) times a day for 7 days. 10/21/20 10/28/20  Ness Brand, DO  
albuterol sulfate (PROAIR RESPICLICK) 90 mcg/actuation breath activated inhaler Take 2 Puffs by inhalation every four (4) hours as needed for Wheezing, Shortness of Breath or Cough. 10/21/20   Ness Brand, DO  
inhalational spacing device 1 Each by Does Not Apply route as needed for Wheezing. 10/21/20   Ness Brand, DO  
 
Current Facility-Administered Medications Medication Dose Route Frequency  sodium chloride (NS) flush 5-40 mL  5-40 mL IntraVENous Q8H  
 carvediloL (COREG) tablet 12.5 mg  12.5 mg Oral BID WITH MEALS  
 hydrALAZINE (APRESOLINE) tablet 50 mg  50 mg Oral TID  enoxaparin (LOVENOX) injection 40 mg  40 mg SubCUTAneous DAILY  thiamine (B-1) 200 mg in 0.9% sodium chloride 100 mL IVPB  200 mg IntraVENous DAILY  folic acid (FOLVITE) 1 mg in 0.9% sodium chloride 50 mL ivpb   IntraVENous DAILY  [Held by provider] lisinopriL (PRINIVIL, ZESTRIL) tablet 40 mg  40 mg Oral DAILY  haloperidoL (HALDOL) tablet 10 mg  10 mg Oral DAILY  pantoprazole (PROTONIX) tablet 40 mg  40 mg Oral DAILY No Known Allergies Social History Tobacco Use  Smoking status: Not on file Substance Use Topics  Alcohol use: Not on file History reviewed. No pertinent family history. Review of Systems: A comprehensive review of systems was negative except for that written in the HPI. Objective:  
Vital Signs:   
Visit Vitals /75 (BP 1 Location: Left arm, BP Patient Position: At rest) Pulse 75 Temp 98.2 °F (36.8 °C) Resp 20 Ht 5' 9\" (1.753 m) Wt 80 kg (176 lb 6.4 oz) SpO2 96% BMI 26.05 kg/m² O2 Device: Room air O2 Flow Rate (L/min): 3 l/min Temp (24hrs), Av °F (36.7 °C), Min:97.1 °F (36.2 °C), Max:98.4 °F (36.9 °C) Intake/Output:  
Last shift:      No intake/output data recorded. Last 3 shifts: 10/28 0701 - 10/29 1900 In: 56 [P.O.:460; I.V.:150] Out: 2550 [Urine:2550] Intake/Output Summary (Last 24 hours) at 10/29/2020 2323 Last data filed at 10/29/2020 3373 Gross per 24 hour Intake 100 ml Output 1950 ml Net -1850 ml Physical Exam: 
 
General:   Sleeping, uncooperative, no acute distress, appears stated age. Head:  Normocephalic, without obvious abnormality, atraumatic. Eyes:  Conjunctivae/corneas clear. No ocular drainage Nose: Nares normal. Septum midline. No drainage Throat: Lips, mucosa, and tongue normal.  Poor dentition Neck: Supple, symmetrical, trachea midline, positive JVD Back:   Symmetric, no curvature. ROM normal.  
Lungs:    Patient declined this portion of examination Chest wall:  No tenderness or deformity. Heart:   Patient declined this portion of examination Abdomen:    Patient declined this portion of examination Extremities: Extremities normal, atraumatic, no cyanosis or edema. Pulses:  Patient declined this portion of examination Skin: Skin color, texture, turgor normal. No rashes or lesions. Normal cap refill. Lymph nodes:  Patient declined this portion of examination Neurologic: Grossly nonfocal, able to move all extremities, coordination appears intact Data:  
 
Recent Results (from the past 24 hour(s)) MAGNESIUM Collection Time: 10/29/20  2:45 AM  
Result Value Ref Range Magnesium 2.6 1.6 - 2.6 mg/dL PHOSPHORUS Collection Time: 10/29/20  2:45 AM  
Result Value Ref Range Phosphorus 3.7 2.5 - 4.9 MG/DL  
CBC WITH AUTOMATED DIFF Collection Time: 10/29/20  2:45 AM  
Result Value Ref Range WBC 7.5 4.6 - 13.2 K/uL  
 RBC 3.96 (L) 4.70 - 5.50 M/uL  
 HGB 12.3 (L) 13.0 - 16.0 g/dL HCT 34.9 (L) 36.0 - 48.0 %  MCV 88.1 74.0 - 97.0 FL  
 MCH 31.1 24.0 - 34.0 PG  
 MCHC 35.2 31.0 - 37.0 g/dL  
 RDW 13.5 11.6 - 14.5 % PLATELET 338 607 - 550 K/uL MPV 9.8 9.2 - 11.8 FL  
 NEUTROPHILS 86 (H) 40 - 73 % LYMPHOCYTES 6 (L) 21 - 52 % MONOCYTES 8 3 - 10 % EOSINOPHILS 0 0 - 5 % BASOPHILS 0 0 - 2 %  
 ABS. NEUTROPHILS 6.5 1.8 - 8.0 K/UL  
 ABS. LYMPHOCYTES 0.4 (L) 0.9 - 3.6 K/UL  
 ABS. MONOCYTES 0.6 0.05 - 1.2 K/UL  
 ABS. EOSINOPHILS 0.0 0.0 - 0.4 K/UL  
 ABS. BASOPHILS 0.0 0.0 - 0.1 K/UL  
 DF AUTOMATED METABOLIC PANEL, BASIC Collection Time: 10/29/20  2:45 AM  
Result Value Ref Range Sodium 122 (L) 136 - 145 mmol/L Potassium 4.9 3.5 - 5.5 mmol/L Chloride 90 (L) 100 - 111 mmol/L  
 CO2 27 21 - 32 mmol/L Anion gap 5 3.0 - 18 mmol/L Glucose 117 (H) 74 - 99 mg/dL BUN 24 (H) 7.0 - 18 MG/DL Creatinine 1.12 0.6 - 1.3 MG/DL  
 BUN/Creatinine ratio 21 (H) 12 - 20 GFR est AA >60 >60 ml/min/1.73m2 GFR est non-AA >60 >60 ml/min/1.73m2 Calcium 8.6 8.5 - 10.1 MG/DL  
EKG, 12 LEAD, SUBSEQUENT Collection Time: 10/29/20  6:53 AM  
Result Value Ref Range Ventricular Rate 64 BPM  
 Atrial Rate 64 BPM  
 P-R Interval 148 ms QRS Duration 98 ms Q-T Interval 486 ms QTC Calculation (Bezet) 501 ms Calculated P Axis 33 degrees Calculated R Axis -56 degrees Calculated T Axis -168 degrees Diagnosis Normal sinus rhythm Possible Left atrial enlargement Left anterior fascicular block Left ventricular hypertrophy ST & T wave abnormality, consider anterolateral ischemia Prolonged QT Abnormal ECG When compared with ECG of 28-OCT-2020 06:59, 
premature ventricular complexes are no longer present Confirmed by Andrae Tang M.D., [address] [place] [address] ([id number]) on 10/29/2020 9:07:13 AM 
  
SARS-COV-2 Collection Time: 10/29/20  7:32 AM  
Result Value Ref Range Specimen source Nasopharyngeal    
 COVID-19 rapid test Not detected NOTD Specimen type NP Swab SODIUM  Collection Time: 10/29/20  4:02 PM  
 Result Value Ref Range Sodium 126 (L) 136 - 145 mmol/L Telemetry:normal sinus rhythm Imaging: 
I have personally reviewed the patients radiographs and have reviewed the reports: CXR Results  (Last 48 hours) 10/29/20 0612  XR CHEST PORT Final result Impression:  IMPRESSION:   
   
Unchanged cardiomegaly, without findings of decompensation. Improved lung aeration. Narrative:  INDICATION: Shortness of breath TECHNIQUE: Portable chest radiograph COMPARISON: CTA chest October 26, 2020, chest radiograph October 25, 2020 FINDINGS:  
   
Improved lung aeration when compared to prior exams. No focal lung  
consolidation. Unchanged cardiomegaly. No findings of decompensation. No pleural effusion or pneumothorax. CT Results  (Last 48 hours) None 10/25/20 ECHO ADULT FOLLOW-UP OR LIMITED 10/27/2020 10/27/2020 Narrative · LV: Estimated LVEF is 20 - 25%. Dilated left ventricle. Moderately  
reduced systolic function. · MV: Mitral valve non-specific thickening. Possible small mobile mass on  
anterior leaflet, 5 x 5 mm, cannot exclude vegetation. Signed by: Chris Pickard MD  
 
Results for orders placed or performed during the hospital encounter of 10/25/20 EKG, 12 LEAD, INITIAL Result Value Ref Range Ventricular Rate 97 BPM  
 Atrial Rate 97 BPM  
 P-R Interval 150 ms QRS Duration 102 ms Q-T Interval 388 ms QTC Calculation (Bezet) 492 ms Calculated P Axis 46 degrees Calculated R Axis -48 degrees Calculated T Axis 86 degrees Diagnosis Normal sinus rhythm Possible Left atrial enlargement Left anterior fascicular block Left ventricular hypertrophy Septal infarct (cited on or before 21-OCT-2020) Abnormal ECG When compared with ECG of 21-OCT-2020 15:45, 
ST elevation now present in Anterior leads T wave inversion no longer evident in Inferior leads T wave inversion less evident in Anterolateral leads QT has shortened Confirmed by Deborah Hernandez ([id number]) on 10/26/2020 6:55:00 AM 
  
 
 
  
 
  
 
 
 
Julian Ramires MD/MPH Pulmonary, Critical Care Medicine [address] [facility]

## 2020-10-30 NOTE — PROGRESS NOTES
Chart reviewed. No discharge needs identified at this time. Case Management will continue to follow as assist with discharge needs when appropriate. DEION Noyola RN Care Management Pager: [phone]

## 2020-10-30 NOTE — PROGRESS NOTES
*ATTENTION:  This note has been created by a medical student for educational purposes only. Please do not refer to the content of this note for clinical decision-making, billing, or other purposes. Please see attending physicians note to obtain clinical information on this patient. * Medical Student Progress Note [address] **Medical Student Note for Educational Purposes Only** Patient: Jacqueline Bull MRN: [id number]  CSN: [id number] YOB: 1960  Age: 63 y.o. Sex: male DOA: 10/25/2020 LOS:  LOS: 4 days Assessment/Plan Jacqueline Bull is an 63 y.o. male with dilated cardiomyopathy, schizophrenia, heart failure, HTN, and acute respiratory distress who is admitted for worsening heart failure and acute respiratory exacerbation. 1. Hyponatremia: Considered hypotonic and possibly due to SIADH or polydipsia due to low urine osmolality on urine chemistry. Monitor haldol dosage. 2. Respiratory Distress/Pulmonary HTN: Patient is sitting comfortably today without oxygen supplementation. Counseling on smoking cessation and need for medication compliance before discharge is necessary. CTA scan done on 10/26/2020 which showed no abnormalities. Maintain net negative fluid balance and diuresis. Continue PRN albuterol. Continue ICS while inpatient. Monitor SpO2 and keep >88%. Continue ipatropium albuterol long acting inhalers. Echocardiogram shows EF of 20-25%. Increased BNP and ECG are indicative of Congestive heart failure w/ reduced ejection fraction. Echo showed probable mitral valve vegetation. 3. Systolic CHF exacerbation: Continue ACEi. WADE to better assess valvular function. 4. QT prolongation: Continue daily EKGs and careful with certain QT prolonging meds. 5. Elevated troponins: Considered secondary to demand ischemia Diet:  NPO 
DVT Prophylaxis: Lovenox 40mg Subjective: Pt states that he doing better today. Pt mentions wanting to go home as he is no longer having difficulty breathing, chest pain, and no longer needs oxygen supplementation. Patient has no nausea or vomiting, no syncopal episodes, and no chest pain. Patient states that he does not want to get an ultrasound of the heart as he is focused on getting home today Objective:  
  
Patient Vitals for the past 24 hrs: 
 Temp Pulse Resp BP SpO2  
10/30/20 0738 97.6 °F (36.4 °C) 63 18 (!) 152/87 96 % 10/30/20 0346 98.4 °F (36.9 °C) 70 18 112/82 96 % 10/29/20 2356 97 °F (36.1 °C) 64 18 102/65 99 % 10/29/20 1834 98.2 °F (36.8 °C) 75 20 112/75 96 % 10/29/20 1433 97.1 °F (36.2 °C) 77 24 133/88 99 % 10/29/20 1350  78 20 (!) 187/88 100 % 10/29/20 1300  64 16 (!) 156/99 95 % 10/29/20 1246  60 24 (!) 153/95 93 % 10/29/20 1231  60 26 134/88 94 % 10/29/20 1216  62 22 (!) 139/94 91 % 10/29/20 1201  64 20 122/82 93 % 10/29/20 1146  70 20 137/81 95 % 10/29/20 1056  60 26 (!) 153/97 100 % 10/29/20 1041  64 22 (!) 153/99 100 % 10/29/20 1026  62 20 (!) 151/93 100 % 10/29/20 1011  62 20 (!) 136/95 100 % 10/29/20 0956  62 16 (!) 150/99 100 % 10/29/20 0941  68 18 (!) 140/95 95 % 10/29/20 0926  62 18 (!) 152/92 100 % 10/29/20 0911  64 20 (!) 149/87 100 % 10/29/20 0856  62 18 (!) 141/92 100 % 10/29/20 0841  60 20 (!) 134/94 100 % 10/29/20 0834  60 20 (!) 137/90 100 % Intake/Output Summary (Last 24 hours) at 10/30/2020 4096 Last data filed at 10/29/2020 5264 Gross per 24 hour Intake  Output 750 ml Net -750 ml Physical Exam:  
General:  Alert and Responsive and in No acute distress. CV:  Normal heart rate with regular rhythm. No murmurs, rubs, or gallops. RESP:  Unlabored breathing. Lungs clear to auscultation. No wheezes, rales, or rhonchi. Equal expansion bilaterally. ABD:  Soft, nontender, nondistended. No hepatosplenomegaly.   No suprapubic tenderness. No CVA tenderness. Skin:  No rashes, lesions, or ulcers. Ventilator Settings: 
  
  
  
  
 
 
Lab/Data Reviewed: All lab results for the last 24 hours reviewed. Scheduled Medications Reviewed: 
Current Facility-Administered Medications Medication Dose Route Frequency  sodium chloride (NS) flush 5-40 mL  5-40 mL IntraVENous Q8H  
 carvediloL (COREG) tablet 12.5 mg  12.5 mg Oral BID WITH MEALS  
 hydrALAZINE (APRESOLINE) tablet 50 mg  50 mg Oral TID  enoxaparin (LOVENOX) injection 40 mg  40 mg SubCUTAneous DAILY  thiamine (B-1) 200 mg in 0.9% sodium chloride 100 mL IVPB  200 mg IntraVENous DAILY  folic acid (FOLVITE) 1 mg in 0.9% sodium chloride 50 mL ivpb   IntraVENous DAILY  [Held by provider] lisinopriL (PRINIVIL, ZESTRIL) tablet 40 mg  40 mg Oral DAILY  haloperidoL (HALDOL) tablet 10 mg  10 mg Oral DAILY  pantoprazole (PROTONIX) tablet 40 mg  40 mg Oral DAILY Imaging, microbiology, and EKG/Telemetry: 
 
Imaging: MODALITY IMPRESSION  
XR Results from Hospital Encounter encounter on 10/25/20 XR CHEST PORT Narrative INDICATION: Shortness of breath TECHNIQUE: Portable chest radiograph COMPARISON: CTA chest October 26, 2020, chest radiograph October 25, 2020 FINDINGS: 
 
Improved lung aeration when compared to prior exams. No focal lung 
consolidation. Unchanged cardiomegaly. No findings of decompensation. No pleural effusion or pneumothorax. Impression IMPRESSION:  
 
Unchanged cardiomegaly, without findings of decompensation. Improved lung aeration. CT Results from Hospital Encounter encounter on 10/25/20 CTA CHEST W OR W WO CONT Narrative EXAM: CTA CHEST PULMONARY EMBOLISM CLINICAL INDICATION/HISTORY: 
 
COMPARISON: r/o PE . Shortness of breath and has progressively worsened over the 
past few days. TECHNIQUE: CTA of the chest was performed using timing optimized for pulmonary embolism technique, with 100 cc of Isovue-370 IV contrast.  To maximize 
sensitivity the sagittal and coronal reconstructions were created using a 3D 
multislice MIP (maximal intensity projection) methodology. CT scans at this facility are performed using dose optimization technique as 
appropriate to the performed exam, to include automated exposure control, 
adjustment of the mA and/or kV according to patient size (including appropriate 
matching for site specific examinations), or use of iterative reconstruction 
technique. FINDINGS: 
Pulmonary arteries (includes assessment of MIP images): Study is limited by 
respiratory motion. Within this limitation, no filling defects are seen in the 
main, lobar, or visualized segmental arteries. Aorta and other cardiovascular structures: Study is not timed for the detection 
of aortic dissection. Ascending aorta measures up to 4.5 cm. Descending aorta 
measures up to 4.0 cm. Heart is enlarged. Heart Strain assessment: 
-  RV/LV ratio (normal <0.9): Normal 
-  Dysfunction or bowing of interventricular septum: None -  Main pulmonary artery enlargement (>30mm): 3.7 cm -  There is visualization of contrast reflux from the right heart into the 
IVC/hepatic veins. Lung/Airway:  Small right pleural effusion with mild overlying consolidation. Trace left pleural effusion. Patchy groundglass consolidations at the bilateral 
apices. Small amount of secretions in the trachea Lymph nodes:  No lymphadenopathy. Chest wall and lower neck: Negative. Upper abdominal structures: Bilateral renal cysts. Additional subcentimeter 
hypoattenuating lesions bilaterally are too small to characterize, but likely 
cysts. Cholelithiasis. Bones: Negative. Impression IMPRESSION: 
 
1. Study is limited by respiratory motion, but no definite pulmonary embolism. 2. Enlarged main pulmonary artery suggests pulmonary hypertension. 3. Small right and trace left pleural effusions. Mild overlying consolidation on 
the right may represent atelectasis or infiltrate. Patchy groundglass 
consolidations in the bilateral upper lobes may represent multifocal infection 
versus atypical edema. 4. Cholelithiasis. 5. Ascending aorta measures up to 4.5 cm. Descending aorta measures up to 4.0 
cm. Thank you for enabling us to participate in the care of this patient. MRI No results found for this or any previous visit. ULTRASOUND Results from Hospital Encounter encounter on 10/25/20 US ABD LTD Impression IMPRESSION: 
Cholelithiasis without secondary sonographic findings of acute cholecystitis. No sonographic findings of cirrhosis. [place] [facility] [place] US ABD LTD Impression IMPRESSION: 
 
Cholelithiasis; normal biliary ducts. The liver and right kidney are normal 
except for a probable mid renal peripelvic cyst.  The visualized portion head 
and body of the pancreas is unremarkable. Cardiology Procedures/Testing: MODALITY RESULTS  
EKG Results for orders placed or performed during the hospital encounter of 10/25/20 EKG, 12 LEAD, INITIAL Result Value Ref Range Ventricular Rate 97 BPM  
 Atrial Rate 97 BPM  
 P-R Interval 150 ms QRS Duration 102 ms Q-T Interval 388 ms QTC Calculation (Bezet) 492 ms Calculated P Axis 46 degrees Calculated R Axis -48 degrees Calculated T Axis 86 degrees Diagnosis Normal sinus rhythm Possible Left atrial enlargement Left anterior fascicular block Left ventricular hypertrophy Septal infarct (cited on or before 21-OCT-2020) Abnormal ECG When compared with ECG of 21-OCT-2020 15:45, 
ST elevation now present in Anterior leads T wave inversion no longer evident in Inferior leads T wave inversion less evident in Anterolateral leads QT has shortened Confirmed by Odilon Jolly ([id number]) on 10/26/2020 6:55:00 AM 
 ECHO 10/25/20 ECHO ADULT FOLLOW-UP OR LIMITED 10/27/2020 10/27/2020 Narrative · LV: Estimated LVEF is 20 - 25%. Dilated left ventricle. Moderately  
reduced systolic function. · MV: Mitral valve non-specific thickening. Possible small mobile mass on  
anterior leaflet, 5 x 5 mm, cannot exclude vegetation. Signed by: Blanca Barry MD  
  
 
Special Testing/Procedures: MODALITY RESULTS MICRO All Micro Results Procedure Component Value Units Date/Time CULTURE, BLOOD [[id number]] Collected:  10/25/20 2350 Order Status:  Completed Specimen:  Blood Updated:  10/30/20 0730 Special Requests: NO SPECIAL REQUESTS Culture result: NO GROWTH 4 DAYS     
 CULTURE, BLOOD [[id number]] Collected:  10/25/20 2020 Order Status:  Completed Specimen:  Blood Updated:  10/30/20 0730 Special Requests: NO SPECIAL REQUESTS Culture result: NO GROWTH 5 DAYS     
 CULTURE, RESPIRATORY/SPUTUM/BRONCH Rozanna Mortimer STAIN [[id number]]  (Abnormal) Collected:  10/27/20 1730 Order Status:  Completed Specimen:  Sputum Updated:  10/29/20 1103 Special Requests: NO SPECIAL REQUESTS     
  GRAM STAIN FEW WBCS SEEN     
      
  RARE EPITHELIAL CELLS SEEN  
     
   NO ORGANISMS SEEN Culture result:    
  LIGHT NORMAL RESPIRATORY HAY  
     
      
  FEW YEAST, (APPARENT CANDIDA ALBICANS) CULTURE, MRSA [[id number]] Collected:  10/26/20 1730 Order Status:  Completed Specimen:  Nasal from Nares Updated:  10/28/20 1846 Special Requests: NO SPECIAL REQUESTS Culture result: MRSA NOT PRESENT Screening of patient nares for MRSA is for surveillance purposes and, if positive, to facilitate isolation considerations in high risk settings. It is not intended for automatic decolonization interventions per se as regimens are not sufficiently effective to warrant routine use. RESPIRATORY PANEL,PCR,NASOPHARYNGEAL [[id number]] Collected:  10/26/20 9495 Order Status:  Completed Specimen:  Nasopharyngeal Updated:  10/26/20 1714 Adenovirus Not detected Coronavirus 229E Not detected Coronavirus HKU1 Not detected Coronavirus CVNL63 Not detected Coronavirus OC43 Not detected Metapneumovirus Not detected Rhinovirus and Enterovirus Not detected Influenza A Not detected Influenza A, subtype H1 Not detected Influenza A, subtype H3 Not detected INFLUENZA A H1N1 PCR Not detected Influenza B Not detected Parainfluenza 1 Not detected Parainfluenza 2 Not detected Parainfluenza 3 Not detected Parainfluenza virus 4 Not detected RSV by PCR Not detected B. parapertussis, PCR Not detected Bordetella pertussis - PCR Not detected Chlamydophila pneumoniae DNA, QL, PCR Not detected Mycoplasma pneumoniae DNA, QL, PCR Not detected LEGIONELLA PNEUMOPHILA AG, URINE [[id number]] Collected:  10/26/20 1000 Order Status:  Completed Specimen:  Urine, random Updated:  10/26/20 1524 Legionella Ag, urine Negative STREP PNEUMO AG, URINE [[id number]] Collected:  10/26/20 1000 Order Status:  Completed Specimen:  Urine, random Updated:  10/26/20 1149 Strep pneumo Ag, urine Negative UA No results found for this or any previous visit. ASHLEY Solomon, MS-III [facility]

## 2020-10-30 NOTE — PROGRESS NOTES
In Patient Progress note Admit Date: 10/25/2020 Impression: #1 hypotonic hyponatremia, likely acute on chronic, suspect #2 mild YAIMA, improved #3 acute hypoxic respiratory failure #4 CT with bilateral infiltrates pneumonia vers 
 atypical edema #5 dilated nonischemic cardiomyopathy #6 CT indicative of pulmonary hypertension #6 history of smoker #7 uncontrolled hypertension #8 diagnosis of schizophrenia, concern for question polydipsia #9 elevated troponins #10 normal liver appearance on RUQ US, LFT wnl  
#11 concern for mitral valve vegetation on TTE , WADE refused Plan: 
  
#1 continue fluid restrict 800 ml #2 follow sodium daily #3 restart lasix 20 mg daily #4 renal panel in AM  
  
Please call with questions, 
  
Beverly Edwards MD FASN Cell [phone] Pager: [phone] Subjective:  
 
Denies SOB/CP Current Facility-Administered Medications:  
  sodium chloride (NS) flush 5-40 mL, 5-40 mL, IntraVENous, Q8H, Gwyn Clement MD, 10 mL at 10/30/20 6797   sodium chloride (NS) flush 5-40 mL, 5-40 mL, IntraVENous, PRN, Gwyn Arciniega MD 
  ipratropium-albuterol (COMBIVENT RESPIMAT) 20 mcg-100 mcg inhalation spray, 2 Puff, Inhalation, Q6H PRN, Cruz Lynne MD 
  carvediloL (COREG) tablet 12.5 mg, 12.5 mg, Oral, BID WITH MEALS, Lonnie CONTE, [place], 12.5 mg at 10/30/20 9396   hydrALAZINE (APRESOLINE) tablet 50 mg, 50 mg, Oral, TID, Mir Taylor MD, 50 mg at 10/30/20 8364   acetaminophen (TYLENOL) tablet 650 mg, 650 mg, Oral, Q6H PRN **OR** acetaminophen (TYLENOL) suppository 650 mg, 650 mg, Rectal, Q6H PRN, Marisa Mccormack MD 
  polyethylene glycol (MIRALAX) packet 17 g, 17 g, Oral, DAILY PRN, Marisa Mccormack MD 
  enoxaparin (LOVENOX) injection 40 mg, 40 mg, SubCUTAneous, DAILY, Marisa Mccormack MD, 40 mg at 10/29/20 9756   thiamine (B-1) 200 mg in 0.9% sodium chloride 100 mL IVPB, 200 mg, IntraVENous, DAILY, Mary Beth Whitmore PA-C, 200 mg at 10/29/20 1450   folic acid (FOLVITE) 1 mg in 0.9% sodium chloride 50 mL ivpb, , IntraVENous, DAILY, Mary Beth Whitmore PA-C 
  [Held by provider] lisinopriL (PRINIVIL, ZESTRIL) tablet 40 mg, 40 mg, Oral, DAILY, Ann Marie Starks MD, 40 mg at 10/26/20 1454   hydrALAZINE (APRESOLINE) 20 mg/mL injection 10 mg, 10 mg, IntraVENous, Q6H PRN, Ann Marie Starks MD, 10 mg at 10/27/20 0044 
  haloperidoL (HALDOL) tablet 10 mg, 10 mg, Oral, DAILY, Олег Lynne MD, 10 mg at 10/30/20 8012 
  pantoprazole (PROTONIX) tablet 40 mg, 40 mg, Oral, DAILY, Drea Jackson MD, 40 mg at 10/30/20 5154   trimethobenzamide (TIGAN) injection 200 mg, 200 mg, IntraMUSCular, Q6H PRN, Claudia Mi,  
 
 
 
Objective:  
 
Visit Vitals /79 (BP 1 Location: Left arm, BP Patient Position: At rest) Pulse 68 Temp 97.3 °F (36.3 °C) Resp 18 Ht 5' 9\" (1.753 m) Wt 80 kg (176 lb 6.4 oz) SpO2 98% BMI 26.05 kg/m² Intake/Output Summary (Last 24 hours) at 10/30/2020 1238 Last data filed at 10/29/2020 7856 Gross per 24 hour Intake  Output 750 ml Net -750 ml Physical Exam:  
 
Patient is in no apparent distress. HEENT: mmm Lungs: good air entry, clear to auscultation bilaterally. Cardiovascular system: S1, S2, regular rate and rhythm. Abdomen: soft, non tender, non distended. BS+ Extremities: no clubbing, cyanosis or edema. Neurologic: Alert, oriented X three. Data Review: 
 
Recent Labs 10/30/20 
0024 WBC 7.5  
RBC 3.86* HCT 33.7* MCV 87.3 MCH 31.6 MCHC 36.2 RDW 13.6 Recent Labs 10/30/20 
1015 10/30/20 
0024 10/29/20 
1602 10/29/20 
0245 10/28/20 
2205 10/28/20 
1422 10/28/20 
0450 10/27/20 
1920 BUN  --  28*  --  24*  --   --  25*  --   
CREA  --  1.13  --  1.12  --   --  1.24  --   
CA  --  8.3*  --  8.6  --   --  8.3*  --   
K  --  4.6  --  4.9  --   --  4.6  --   
 * 126* 126* 122* 125* 125* 122* 122* CL  --  94*  --  90*  --   --  87*  --   
CO2  --  27  --  27  --   --  26  --   
PHOS  --  4.0  --  3.7  --   --  4.6  --   
GLU  --  98  --  117*  --   --  127*  --   
 
 
Linda Lopes MD

## 2020-10-30 NOTE — PROGRESS NOTES
Infectious Disease progress Note Reason: Bilateral pneumonia, suspected COVID-19 pneumonia Current abx Prior abx Ceftriaxone, azithromycin since 10/25 Levofloxacin on 10/25 Lines:  
 
 
Assessment : 
 
63 y.o. male who has a history of COPD, chronic smokersmokes 2 packs/day presents to the emergency room on 10/25/2020 secondary to shortness of breath, hypoxia. Clinical presentation consistent with acute hypoxic respiratory failure secondary to decompensated CHF Low procalcitonin argues against typical bacterial pneumonia-recent outpatient antibiotics can mask the full clinical presentation. Hence will monitor clinically to determine this. Clinical presentation not very consistent with COVID-19 infection. COVID-19 test 10/26 results pending Echocardiogram 10/27-results discussed with cardiologist.  Possible small mobile mass on anterior leaflet of mitral valve 5 x 5 mm concerning for possible vegetation. Clinical presentation is not classic for endocarditis. Continued clinical improvement off antibiotics. Discussed with cardiologist.  Patient refuses to have WADE. I rediscussed this with the patient. Patient adamantly refuses this. Acute on chronic Hyponatremia-nephrology follow-up appreciated Recommendations: 
 
1. Hold further antibiotic 2.   diuresis per pulmonary/cardiology 3. Recommend to repeat transthoracic echo after 4 weeks. If mitral valve vegetation is stable at that point and patient has not had any fever in the interim, infective endocarditis would be effectively ruled out. Discussed with dr. Jigar Gottlieb. He is in agreement with this Above plan was discussed in details with patient, dr. Bassem Drew  Please call me if any further questions or concerns. Will continue to participate in the care of this patient. HPI: 
 
 
Patient states that he feels better. He wants to go home.   Does not want any further procedures or testing. Denies any increasing chest pain, shortness of breath 
 
 
 
home Medication List  
 Details  
cefdinir (OMNICEF) 300 mg capsule Take 1 Cap by mouth two (2) times a day for 7 days. Qty: 14 Cap, Refills: 0  
  
doxycycline (VIBRA-TABS) 100 mg tablet Take 1 Tab by mouth two (2) times a day for 7 days. Qty: 14 Tab, Refills: 0  
  
albuterol sulfate (PROAIR RESPICLICK) 90 mcg/actuation breath activated inhaler Take 2 Puffs by inhalation every four (4) hours as needed for Wheezing, Shortness of Breath or Cough. Qty: 1 Inhaler, Refills: 0  
  
inhalational spacing device 1 Each by Does Not Apply route as needed for Wheezing. Qty: 1 Device, Refills: 0 Current Facility-Administered Medications Medication Dose Route Frequency  furosemide (LASIX) tablet 20 mg  20 mg Oral DAILY  sodium chloride (NS) flush 5-40 mL  5-40 mL IntraVENous Q8H  
 sodium chloride (NS) flush 5-40 mL  5-40 mL IntraVENous PRN  
 ipratropium-albuterol (COMBIVENT RESPIMAT) 20 mcg-100 mcg inhalation spray  2 Puff Inhalation Q6H PRN  
 carvediloL (COREG) tablet 12.5 mg  12.5 mg Oral BID WITH MEALS  
 hydrALAZINE (APRESOLINE) tablet 50 mg  50 mg Oral TID  acetaminophen (TYLENOL) tablet 650 mg  650 mg Oral Q6H PRN Or  
 acetaminophen (TYLENOL) suppository 650 mg  650 mg Rectal Q6H PRN  polyethylene glycol (MIRALAX) packet 17 g  17 g Oral DAILY PRN  
 enoxaparin (LOVENOX) injection 40 mg  40 mg SubCUTAneous DAILY  thiamine (B-1) 200 mg in 0.9% sodium chloride 100 mL IVPB  200 mg IntraVENous DAILY  folic acid (FOLVITE) 1 mg in 0.9% sodium chloride 50 mL ivpb   IntraVENous DAILY  [Held by provider] lisinopriL (PRINIVIL, ZESTRIL) tablet 40 mg  40 mg Oral DAILY  hydrALAZINE (APRESOLINE) 20 mg/mL injection 10 mg  10 mg IntraVENous Q6H PRN  
 haloperidoL (HALDOL) tablet 10 mg  10 mg Oral DAILY  pantoprazole (PROTONIX) tablet 40 mg  40 mg Oral DAILY  trimethobenzamide (TIGAN) injection 200 mg  200 mg IntraMUSCular Q6H PRN Allergies: Patient has no known allergies. Temp (24hrs), Av.7 °F (36.5 °C), Min:97 °F (36.1 °C), Max:98.4 °F (36.9 °C) Visit Vitals /79 (BP 1 Location: Left arm, BP Patient Position: At rest) Pulse 68 Temp 97.3 °F (36.3 °C) Resp 18 Ht 5' 9\" (1.753 m) Wt 80 kg (176 lb 6.4 oz) SpO2 98% BMI 26.05 kg/m² ROS: 12 point ROS obtained in details. Pertinent positives as mentioned in HPI,  
otherwise negative Physical Exam: 
 
 
General:  Alert, cooperative, no distress, appears stated age. Head:  Normocephalic, without obvious abnormality, atraumatic. Eyes:  Conjunctivae/corneas clear. Nose: Nares normal. Septum midline. Mucosa dry. No drainage or sinus tenderness. Throat: Lips, mucosa, and tongue normal. Teeth and gums normal.  
Neck: Supple, symmetrical, trachea midline Back:   Symmetric, no curvature. ROM normal.  
Lungs:    No resp distress, equal chest rise. Chest wall:  No tenderness or deformity. Heart:  Regular rate and rhythm, S1, S2 normal, no click, rub or gallop. Abdomen:   Soft,  non-tender. Bowel sounds normal. Umbilical hernia present, small and reducible Extremities: Extremities normal, atraumatic,no  edema Skin: Skin color, texture, turgor normal. No rashes or lesions. Neurologic: Grossly nonfocal  
  
 
 
Labs: Results:  
Chemistry Recent Labs 10/30/20 
1015 10/30/20 
0024 10/29/20 
1602 10/29/20 
0245  10/28/20 
0450 GLU  --  98  --  117*  --  127* * 126* 126* 122*   < > 122* K  --  4.6  --  4.9  --  4.6 CL  --  94*  --  90*  --  87* CO2  --  27  --  27  --  26 BUN  --  28*  --  24*  --  25* CREA  --  1.13  --  1.12  --  1.24  
CA  --  8.3*  --  8.6  --  8.3* AGAP  --  5  --  5  --  9  
BUCR  --  25*  --  21*  --  20  
 < > = values in this interval not displayed. CBC w/Diff Recent Labs 10/30/20 
0024 10/29/20 
0245 10/28/20 9339 WBC 7.5 7.5 8.1  
RBC 3.86* 3.96* 3.81* HGB 12.2* 12.3* 11.8* HCT 33.7* 34.9* 33.0*  
 304 279 GRANS 56 86* 87* LYMPH 27 6* 9* EOS 0 0 0 Microbiology Recent Labs 10/27/20 
1730 CULT LIGHT NORMAL RESPIRATORY HAY  FEW YEAST, (APPARENT CANDIDA ALBICANS)*  
  
 
 
RADIOLOGY: 
 
All available imaging studies/reports in [facility] for this admission were reviewed Dr. Ariane Flynn, Infectious Disease Specialist 
[phone] October 30, 2020 
10:29 AM

## 2020-10-30 NOTE — PROGRESS NOTES
Called dayshift RN to get report. Rn stated that patient refusing to have procedure done. Madie Amezcua MD aware.

## 2020-10-30 NOTE — PROGRESS NOTES
0710 
Bedside and Verbal shift change report received from Simón Renee. Report included the following information SBAR, Kardex, MAR and Recent Results. 4069 Patient in room yelling extremely aggitated. Says he is not having any  More testings done. 7536 Patient out in hallway wants to be discharged. Removed telemetry. Refuses to have put back on at this time. 1 PFM here to take with patient. [address] [place] [address] Resting in bed awaiting discharge. Sherry Buck 62 IV access d/c'd for discharge. 12[address] Discharge instructions given. Patient verbalized understanding. [address] [facility] Patient discharged via w/c accompanied by transport. No acute distress noted on Room air.

## 2020-10-30 NOTE — PROGRESS NOTES
Psychiatry progress note. The patient was seen in consultation for question of capacity. He has a history of schizophrenia being followed at the [facility]. He did not recall the physician. He could not recall his medications. He is said by sister to have somewhat limited intellectual ability but is also described to chronically be irascible and difficult. He has been recommended to have procedure today but is insisting that he is ready to leave the hospital.  He says he has  given them 3 days and this is all that he is willing to do. We did discuss that he could be get more ill not pursuing further investigation and  he said it did not matter. He did understand that he could worsen physically. He says that he would stay home but if his mother saw that he was ill she would call the ambulance and she would \"do what she has to do. \"  He denies any psychiatric symptoms currently and said he has been taking his medicine. Mental status examination showed the patient to be somewhat difficult to understand because of slurred speech. He was oriented to person and situation and place. He initially was asked about time and was responding that he did not know but then looked at the board and said what today's date was. Mood was somewhat irritable with a congruent odd affect. Thought processing was simplistic. He was not exhibiting responses to internal stimuli. He denied hallucinations or delusions. He did say people watch him but said it is the nurses here at the hospital.  He denied homicidal or suicidal ideas. IQ was estimated in the borderline intellectual function range. Assessment: Schizophrenia. At the current time, the patient possesses the capacity to make decisions regarding his own medical care. He did understand that he could worsen physically by not pursuing further medical evaluation and said that it did not matter and he did not wish to do so. The patient is insisting that he leave the hospital AMA and again was aware that this was not recommended by his physicians nor recommended by this physician. He is to remain on his psychiatric medications from the [facility].

## 2020-10-30 NOTE — PROGRESS NOTES
[facility] - Progress Note Admit Date: 10/25/2020 Assessment:  
 
Hospital Problems  Never Reviewed Codes Class Noted POA Pneumonia due to COVID-19 virus ICD-10-CM: U07.1, J12.89 ICD-9-CM: 480.8  10/26/2020 Unknown Hyponatremia ICD-10-CM: E87.1 ICD-9-CM: 276.1  10/26/2020 Yes Shortness of breath ICD-10-CM: R06.02 
ICD-9-CM: 786.05  10/26/2020 Unknown * (Principal) Respiratory failure ([facility] [address] ICD-10-CM: J96.90 ICD-9-CM: 518.81  10/26/2020 Unknown  
   
  
 
 
-Acute systolic heart failure, new diagnosis -New nonischemic cardiomyopathy with EF 25% by echo 10/28/20. Cardiac cath 10/29/20 with patent coronaries. 
-Echo 10/28/20, cannot exclude mitral valve mass although given clinical scenario, low suspicion for endocarditis, follow-up blood culttures, ID following. Plan was for WADE for completeness but patient has refused any further testing at this time. 
-Presented due to dyspnea, atypical chest pain.  Both improved.   
-Bilateral infiltrates by chest CTA 10/26/2020, no PE  
-Hyponatremia, Na ~120 this admission, nephrology consulted.  Possible polydipsia vs CHF as cause 
-Hx HTN 
-Hx tobacco abuse, reports plan to quit once discharged -Schizophrenia 
  
No primary cardiologist. 
 
 
Plan:  
 
Plan was for WDAE today, patient has refused any further testing at this time and wishes to leave AMA. Appreciate ongoing ID involvement. Can plan outpatient follow up of TTE if patient agrees. Plan for psych to evaluate for competency. Maintenance lasix resumed per nephrology. Resume lisinopril when ok from nephrology standpoint. Patient is continued on coreg. Patient not appropriate candidate for lifevest given competency even if he agreed. Patient declining WADE. Discussed with Dr. Hilton Simon, if leaves AMA, will plan regular echo in 4 weeks as outpatient. I saw, examined, and evaluated the patient.   I personally reviewed the patient's labs, tests, vitals, orders, medications, updated history, and other providers assessments. I personally agree with the findings as stated and the plan as documented. Subjective:  
 
Denies cp, sob Objective:  
  
Patient Vitals for the past 8 hrs: 
 Temp Pulse Resp BP SpO2  
10/30/20 1103 97.3 °F (36.3 °C) 68 18 121/79 98 % 10/30/20 0738 97.6 °F (36.4 °C) 63 18 (!) 152/87 96 % Patient Vitals for the past 96 hrs: 
 Weight 10/29/20 0636 176 lb 6.4 oz (80 kg) 10/27/20 1623 195 lb (88.5 kg) Intake/Output Summary (Last 24 hours) at 10/30/2020 1251 Last data filed at 10/29/2020 4656 Gross per 24 hour Intake  Output 750 ml Net -750 ml Physical Exam: 
General:  no distress Neck:  no JVD Lungs:  clear to auscultation bilaterally Heart:  regular rate and rhythm Abdomen:  abdomen is soft Extremities:  extremities normal, atraumatic, no cyanosis or edema Data Review:  
 
Labs: Results:  
   
Chemistry Recent Labs 10/30/20 
1015 10/30/20 
0024 10/29/20 
1602 10/29/20 
0245  10/28/20 
0450 GLU  --  98  --  117*  --  127* * 126* 126* 122*   < > 122* K  --  4.6  --  4.9  --  4.6 CL  --  94*  --  90*  --  87* CO2  --  27  --  27  --  26 BUN  --  28*  --  24*  --  25* CREA  --  1.13  --  1.12  --  1.24  
CA  --  8.3*  --  8.6  --  8.3*  
MG  --  2.3  --  2.6  --  2.3 PHOS  --  4.0  --  3.7  --  4.6 AGAP  --  5  --  5  --  9  
BUCR  --  25*  --  21*  --  20  
 < > = values in this interval not displayed. CBC w/Diff Recent Labs 10/30/20 
0024 10/29/20 
0245 10/28/20 
9570 WBC 7.5 7.5 8.1  
RBC 3.86* 3.96* 3.81* HGB 12.2* 12.3* 11.8* HCT 33.7* 34.9* 33.0*  
 304 279 GRANS 56 86* 87* LYMPH 27 6* 9* EOS 0 0 0 Cardiac Enzymes No results found for: CPK, CK, CKMMB, CKMB, RCK3, CKMBT, CKNDX, CKND1, MARLO, TROPT, TROIQ, TAMMY, TROPT, TNIPOC, BNP, BNPP  
 Coagulation No results for input(s): PTP, INR, APTT, INREXT in the last 72 hours. Lipid Panel No results found for: CHOL, CHOLPOCT, CHOLX, CHLST, CHOLV, 127074, HDL, HDLP, LDL, LDLC, DLDLP, 010874, VLDLC, VLDL, TGLX, TRIGL, TRIGP, TGLPOCT, CHHD, CHHDX  
BNP No results found for: BNP, BNPP, XBNPT Liver Enzymes No results for input(s): TP, ALB, TBIL, AP in the last 72 hours. No lab exists for component: SGOT, GPT, DBIL Digoxin Thyroid Studies Lab Results Component Value Date/Time TSH 0.69 10/27/2020 10:40 AM  
    
 
Signed By: ASUNCION Driscoll October 30, 2020

## 2020-10-30 NOTE — PROGRESS NOTES
[address] [facility] Progress Note Patient: Tami Quiles MRN: [id number] SSN: [id number]  YOB: 1960 Age: 63 y.o. Sex: male Admit Date: 10/25/2020 LOS: 4 days Chief Complaint Patient presents with  Shortness of Breath Subjective:  
 
Patient refused full exam and interview, stating adamently and repeatedly that he does not want any further procedures, and wants to go home. Explained to him my concerns about his health, about further work up, and about potential risks, but patient reiterated his desire to go home. Explained AMA and risk, benefit, alternative. Patient seemed to understand, did not want to leave AMA yet, but did not wish to stay. Review of Systems Unable to perform ROS: Other Refused Objective:  
 
Visit Vitals /82 (BP 1 Location: Left arm, BP Patient Position: At rest) Pulse 70 Temp 98.4 °F (36.9 °C) Resp 18 Ht 5' 9\" (1.753 m) Wt 80 kg (176 lb 6.4 oz) SpO2 96% BMI 26.05 kg/m² Physical Exam: Refused Intake and Output: 
Current Shift: No intake/output data recorded. Last three shifts: 10/28 0701 - 10/29 1900 In: 56 [P.O.:460; I.V.:150] Out: 2550 [Urine:2550] Lab/Data Review: 
Recent Results (from the past 12 hour(s)) MAGNESIUM Collection Time: 10/30/20 12:24 AM  
Result Value Ref Range Magnesium 2.3 1.6 - 2.6 mg/dL PHOSPHORUS Collection Time: 10/30/20 12:24 AM  
Result Value Ref Range Phosphorus 4.0 2.5 - 4.9 MG/DL  
CBC WITH AUTOMATED DIFF Collection Time: 10/30/20 12:24 AM  
Result Value Ref Range WBC 7.5 4.6 - 13.2 K/uL  
 RBC 3.86 (L) 4.70 - 5.50 M/uL  
 HGB 12.2 (L) 13.0 - 16.0 g/dL HCT 33.7 (L) 36.0 - 48.0 % MCV 87.3 74.0 - 97.0 FL  
 MCH 31.6 24.0 - 34.0 PG  
 MCHC 36.2 31.0 - 37.0 g/dL  
 RDW 13.6 11.6 - 14.5 % PLATELET 762 317 - 340 K/uL MPV 8.9 (L) 9.2 - 11.8 FL  
 NEUTROPHILS 56 40 - 73 % LYMPHOCYTES 27 21 - 52 % MONOCYTES 17 (H) 3 - 10 % EOSINOPHILS 0 0 - 5 % BASOPHILS 0 0 - 2 %  
 ABS. NEUTROPHILS 4.2 1.8 - 8.0 K/UL  
 ABS. LYMPHOCYTES 2.0 0.9 - 3.6 K/UL  
 ABS. MONOCYTES 1.3 (H) 0.05 - 1.2 K/UL  
 ABS. EOSINOPHILS 0.0 0.0 - 0.4 K/UL  
 ABS. BASOPHILS 0.0 0.0 - 0.1 K/UL  
 DF AUTOMATED METABOLIC PANEL, BASIC Collection Time: 10/30/20 12:24 AM  
Result Value Ref Range Sodium 126 (L) 136 - 145 mmol/L Potassium 4.6 3.5 - 5.5 mmol/L Chloride 94 (L) 100 - 111 mmol/L  
 CO2 27 21 - 32 mmol/L Anion gap 5 3.0 - 18 mmol/L Glucose 98 74 - 99 mg/dL BUN 28 (H) 7.0 - 18 MG/DL Creatinine 1.13 0.6 - 1.3 MG/DL  
 BUN/Creatinine ratio 25 (H) 12 - 20 GFR est AA >60 >60 ml/min/1.73m2 GFR est non-AA >60 >60 ml/min/1.73m2 Calcium 8.3 (L) 8.5 - 10.1 MG/DL Telemetry Yes but off at bedside Oxygen no Assessment and Plan:  
63 y.o. male with PMH COPD, Schizophrenia, HTN, h/o anemia, LVH, QT prolongation, now presenting with complaint of Shortness of breath, hypoxemia. 
  
Acute Hypoxic Respiratory Failure. Echocardiogram done IP shows EF of 20-25%. This, combined with an increasing BNP, is indicative of Congestive heart failure, with reduced ejection fraction. Echo also showed potential mitral valve vegetation. 
-Pulm, cards, nephro, and ID consulted, their recs appreciated. -combivent PRN 
-Lasix per nephro/cards 
-Strict I/Os 
-Fluid restriction 800ml 
-WADE planned for today, patient is refusing at this time 
  
Hypervolemic hypotonic hyponatremia, Moderate, (improving) likely acute on chronic. 
-Slowly normalizing 
-Nephro consulted, appreciate their recs 
-BMP, mag, phos daily 
-Sodium q8h 
  
QT prolongation 
-Daily EKG 
-Careful with QT prolonging meds 
  
Schizophrenia 
-Continue home haloperidol 10mg every day. 
  
HTN 
-hold home meds 
-Hydralazine and Coreg 
  
Elevated troponins (stable) -likely secondary to demand ischemia 
-daily ekg  
  
  
Diet DIET NPO  
DVT Prophylaxis Lovenox GI Prophylaxis Protonix Code status Full Disposition 2-3 MN  
  
Point of Contact Blake Altman MD, PGY-1  
[facility] [address] Medicine Intern Pager: [phone] October 30, 2020, 8:29 AM

## 2020-10-31 NOTE — PROGRESS NOTES
Patient denies: 
· SOB · Chest pain Patient states that he turned in all of his medications and will go back later today to pick them up Patient contacted regarding recent discharge and COVID-19 risk. Discussed COVID-19 related testing which was available at this time. Test results were pending. Patient informed of results, if available? n/a Care Transition Nurse/ Ambulatory Care Manager/ LPN Care Coordinator contacted the patient by telephone to perform post discharge assessment. Verified name and  with patient as identifiers. Patient has following risk factors of: pneumonia. CTN/ACM/LPN reviewed discharge instructions, medical action plan and red flags related to discharge diagnosis. Reviewed and educated them on any new and changed medications related to discharge diagnosis. Advised obtaining a 90-day supply of all daily and as-needed medications. Advance Care Planning:  
Does patient have an Advance Directive: currently not on file; patient declined education Education provided regarding infection prevention, and signs and symptoms of COVID-19 and when to seek medical attention with patient who verbalized understanding. Discussed exposure protocols and quarantine from [address] you at higher risk for severe illness  and given an opportunity for questions and concerns. The patient agrees to contact the COVID-19 hotline [phone] or PCP office for questions related to their healthcare. CTN/ACM/LPN provided contact information for future reference. From CDC: Are you at higher risk for severe illness?  Wash your hands often.  Avoid close contact (6 feet, which is about two arm lengths) with people who are sick.  Put distance between yourself and other people if COVID-19 is spreading in your community.  Clean and disinfect frequently touched surfaces.  Avoid all cruise travel and non-essential air travel.  Call your healthcare professional if you have concerns about COVID-19 and your underlying condition or if you are sick. For more information on steps you can take to protect yourself, see CDC's How to Protect Yourself Patient/family/caregiver given information for [employer] and agrees to enroll no Plan for follow-up call in 7-14 days based on severity of symptoms and risk factors.

## 2021-01-01 ENCOUNTER — HOSPITAL ENCOUNTER (INPATIENT)
Age: 61
LOS: 2 days | DRG: 196 | End: 2021-04-19
Attending: EMERGENCY MEDICINE | Admitting: STUDENT IN AN ORGANIZED HEALTH CARE EDUCATION/TRAINING PROGRAM
Payer: MEDICAID

## 2021-01-01 ENCOUNTER — APPOINTMENT (OUTPATIENT)
Dept: GENERAL RADIOLOGY | Age: 61
DRG: 196 | End: 2021-01-01
Attending: EMERGENCY MEDICINE
Payer: MEDICAID

## 2021-01-01 ENCOUNTER — DOCUMENTATION ONLY (OUTPATIENT)
Dept: PULMONOLOGY | Age: 61
End: 2021-01-01

## 2021-01-01 ENCOUNTER — APPOINTMENT (OUTPATIENT)
Dept: GENERAL RADIOLOGY | Age: 61
DRG: 196 | End: 2021-01-01
Attending: NURSE PRACTITIONER
Payer: MEDICAID

## 2021-01-01 ENCOUNTER — APPOINTMENT (OUTPATIENT)
Dept: CT IMAGING | Age: 61
DRG: 196 | End: 2021-01-01
Attending: STUDENT IN AN ORGANIZED HEALTH CARE EDUCATION/TRAINING PROGRAM
Payer: MEDICAID

## 2021-01-01 ENCOUNTER — APPOINTMENT (OUTPATIENT)
Dept: NON INVASIVE DIAGNOSTICS | Age: 61
DRG: 196 | End: 2021-01-01
Attending: NURSE PRACTITIONER
Payer: MEDICAID

## 2021-01-01 VITALS
WEIGHT: 190.04 LBS | BODY MASS INDEX: 25.74 KG/M2 | HEIGHT: 72 IN | DIASTOLIC BLOOD PRESSURE: 87 MMHG | TEMPERATURE: 99 F | SYSTOLIC BLOOD PRESSURE: 136 MMHG

## 2021-01-01 DIAGNOSIS — J96.02 ACUTE RESPIRATORY ACIDOSIS (HCC): ICD-10-CM

## 2021-01-01 DIAGNOSIS — R77.8 ELEVATED TROPONIN: ICD-10-CM

## 2021-01-01 DIAGNOSIS — D64.9 ANEMIA, UNSPECIFIED TYPE: ICD-10-CM

## 2021-01-01 DIAGNOSIS — I46.9 CARDIAC ARREST (HCC): Primary | ICD-10-CM

## 2021-01-01 DIAGNOSIS — R79.89 ELEVATED LACTIC ACID LEVEL: ICD-10-CM

## 2021-01-01 LAB
ALBUMIN SERPL-MCNC: 3.1 G/DL (ref 3.4–5)
ALBUMIN/GLOB SERPL: 0.9 {RATIO} (ref 0.8–1.7)
ALP SERPL-CCNC: 105 U/L (ref 45–117)
ALT SERPL-CCNC: 97 U/L (ref 16–61)
AMPHET UR QL SCN: NEGATIVE
ANION GAP SERPL CALC-SCNC: 10 MMOL/L (ref 3–18)
ANION GAP SERPL CALC-SCNC: 12 MMOL/L (ref 3–18)
ANION GAP SERPL CALC-SCNC: 12 MMOL/L (ref 3–18)
ANION GAP SERPL CALC-SCNC: 13 MMOL/L (ref 3–18)
ANION GAP SERPL CALC-SCNC: 19 MMOL/L (ref 3–18)
APAP SERPL-MCNC: <2 UG/ML (ref 10–30)
APTT PPP: 36.8 SEC (ref 23–36.4)
APTT PPP: 49.8 SEC (ref 23–36.4)
ARTERIAL PATENCY WRIST A: ABNORMAL
ARTERIAL PATENCY WRIST A: YES
ARTERIAL PATENCY WRIST A: YES
AST SERPL-CCNC: 109 U/L (ref 10–38)
ATRIAL RATE: 100 BPM
ATRIAL RATE: 55 BPM
ATRIAL RATE: 90 BPM
B PERT DNA SPEC QL NAA+PROBE: NOT DETECTED
BARBITURATES UR QL SCN: NEGATIVE
BASE DEFICIT BLD-SCNC: 1 MMOL/L
BASE DEFICIT BLD-SCNC: 20 MMOL/L
BASE DEFICIT BLD-SCNC: 4 MMOL/L
BASE DEFICIT BLD-SCNC: 6 MMOL/L
BASE DEFICIT BLD-SCNC: 7 MMOL/L
BASOPHILS # BLD: 0 K/UL (ref 0–0.1)
BASOPHILS NFR BLD: 0 % (ref 0–2)
BDY SITE: ABNORMAL
BENZODIAZ UR QL: NEGATIVE
BILIRUB SERPL-MCNC: 0.7 MG/DL (ref 0.2–1)
BNP SERPL-MCNC: 5823 PG/ML (ref 0–900)
BODY TEMPERATURE: 35.3
BODY TEMPERATURE: 37
BODY TEMPERATURE: 95.9
BORDETELLA PARAPERTUSSIS PCR, BORPAR: NOT DETECTED
BUN SERPL-MCNC: 17 MG/DL (ref 7–18)
BUN SERPL-MCNC: 25 MG/DL (ref 7–18)
BUN SERPL-MCNC: 28 MG/DL (ref 7–18)
BUN/CREAT SERPL: 11 (ref 12–20)
BUN/CREAT SERPL: 18 (ref 12–20)
BUN/CREAT SERPL: 20 (ref 12–20)
BUN/CREAT SERPL: 21 (ref 12–20)
BUN/CREAT SERPL: 22 (ref 12–20)
C PNEUM DNA SPEC QL NAA+PROBE: NOT DETECTED
CALCIUM SERPL-MCNC: 10.3 MG/DL (ref 8.5–10.1)
CALCIUM SERPL-MCNC: 8.4 MG/DL (ref 8.5–10.1)
CALCIUM SERPL-MCNC: 8.4 MG/DL (ref 8.5–10.1)
CALCIUM SERPL-MCNC: 8.5 MG/DL (ref 8.5–10.1)
CALCIUM SERPL-MCNC: 8.6 MG/DL (ref 8.5–10.1)
CALCULATED P AXIS, ECG09: -24 DEGREES
CALCULATED P AXIS, ECG09: 51 DEGREES
CALCULATED P AXIS, ECG09: 55 DEGREES
CALCULATED R AXIS, ECG10: -47 DEGREES
CALCULATED R AXIS, ECG10: -63 DEGREES
CALCULATED R AXIS, ECG10: -64 DEGREES
CALCULATED T AXIS, ECG11: -71 DEGREES
CALCULATED T AXIS, ECG11: 105 DEGREES
CALCULATED T AXIS, ECG11: 90 DEGREES
CANNABINOIDS UR QL SCN: NEGATIVE
CHLORIDE SERPL-SCNC: 100 MMOL/L (ref 100–111)
CHLORIDE SERPL-SCNC: 93 MMOL/L (ref 100–111)
CHLORIDE SERPL-SCNC: 99 MMOL/L (ref 100–111)
CK MB CFR SERPL CALC: 2.3 % (ref 0–4)
CK MB CFR SERPL CALC: 2.4 % (ref 0–4)
CK MB SERPL-MCNC: 12.2 NG/ML (ref 5–25)
CK MB SERPL-MCNC: 9.3 NG/ML (ref 5–25)
CK SERPL-CCNC: 413 U/L (ref 39–308)
CK SERPL-CCNC: 501 U/L (ref 39–308)
CO2 SERPL-SCNC: 16 MMOL/L (ref 21–32)
CO2 SERPL-SCNC: 19 MMOL/L (ref 21–32)
CO2 SERPL-SCNC: 20 MMOL/L (ref 21–32)
CO2 SERPL-SCNC: 21 MMOL/L (ref 21–32)
CO2 SERPL-SCNC: 21 MMOL/L (ref 21–32)
COCAINE UR QL SCN: NEGATIVE
CREAT SERPL-MCNC: 1.15 MG/DL (ref 0.6–1.3)
CREAT SERPL-MCNC: 1.21 MG/DL (ref 0.6–1.3)
CREAT SERPL-MCNC: 1.26 MG/DL (ref 0.6–1.3)
CREAT SERPL-MCNC: 1.54 MG/DL (ref 0.6–1.3)
CREAT SERPL-MCNC: 1.59 MG/DL (ref 0.6–1.3)
D DIMER PPP FEU-MCNC: 3.37 UG/ML(FEU)
DIAGNOSIS, 93000: NORMAL
DIFFERENTIAL METHOD BLD: ABNORMAL
ECHO AO ASC DIAM: 4.26 CM
ECHO AO ROOT DIAM: 3.67 CM
ECHO LA AREA 4C: 27.23 CM2
ECHO LA VOL 2C: 130.06 ML (ref 18–58)
ECHO LA VOL 4C: 89.1 ML (ref 18–58)
ECHO LA VOL BP: 114.98 ML (ref 18–58)
ECHO LA VOL/BSA BIPLANE: 55.16 ML/M2 (ref 16–28)
ECHO LA VOLUME INDEX A2C: 62.4 ML/M2 (ref 16–28)
ECHO LA VOLUME INDEX A4C: 42.75 ML/M2 (ref 16–28)
ECHO LV E' LATERAL VELOCITY: 5.25 CM/S
ECHO LV INTERNAL DIMENSION DIASTOLIC: 6.41 CM (ref 4.2–5.9)
ECHO LV INTERNAL DIMENSION SYSTOLIC: 5.22 CM
ECHO LV IVSD: 1.27 CM (ref 0.6–1)
ECHO LV MASS 2D: 437.8 G (ref 88–224)
ECHO LV MASS INDEX 2D: 210 G/M2 (ref 49–115)
ECHO LV POSTERIOR WALL DIASTOLIC: 1.56 CM (ref 0.6–1)
ECHO LVOT DIAM: 4.33 CM
ECHO MV A VELOCITY: 37.99 CM/S
ECHO MV E DECELERATION TIME (DT): 278.84 MS
ECHO MV E VELOCITY: 30.15 CM/S
ECHO MV E/A RATIO: 0.79
ECHO MV E/E' LATERAL: 5.74
ECHO TV REGURGITANT MAX VELOCITY: 228.86 CM/S
ECHO TV REGURGITANT PEAK GRADIENT: 20.95 MMHG
EOSINOPHIL # BLD: 0 K/UL (ref 0–0.4)
EOSINOPHIL NFR BLD: 0 % (ref 0–5)
ERYTHROCYTE [DISTWIDTH] IN BLOOD BY AUTOMATED COUNT: 13 % (ref 11.6–14.5)
ERYTHROCYTE [DISTWIDTH] IN BLOOD BY AUTOMATED COUNT: 13.5 % (ref 11.6–14.5)
ERYTHROCYTE [DISTWIDTH] IN BLOOD BY AUTOMATED COUNT: 13.8 % (ref 11.6–14.5)
EST. AVERAGE GLUCOSE BLD GHB EST-MCNC: 117 MG/DL
ETHANOL SERPL-MCNC: <3 MG/DL (ref 0–3)
FLUAV H1 2009 PAND RNA SPEC QL NAA+PROBE: NOT DETECTED
FLUAV H1 RNA SPEC QL NAA+PROBE: NOT DETECTED
FLUAV H3 RNA SPEC QL NAA+PROBE: NOT DETECTED
FLUAV SUBTYP SPEC NAA+PROBE: NOT DETECTED
FLUBV RNA SPEC QL NAA+PROBE: NOT DETECTED
GAS FLOW.O2 O2 DELIVERY SYS: ABNORMAL L/MIN
GAS FLOW.O2 SETTING OXYMISER: 18 BPM
GAS FLOW.O2 SETTING OXYMISER: 18 BPM
GAS FLOW.O2 SETTING OXYMISER: 20 BPM
GAS FLOW.O2 SETTING OXYMISER: 24 BPM
GAS FLOW.O2 SETTING OXYMISER: 24 BPM
GLOBULIN SER CALC-MCNC: 3.5 G/DL (ref 2–4)
GLUCOSE BLD STRIP.AUTO-MCNC: 114 MG/DL (ref 70–110)
GLUCOSE BLD STRIP.AUTO-MCNC: 116 MG/DL (ref 70–110)
GLUCOSE BLD STRIP.AUTO-MCNC: 118 MG/DL (ref 70–110)
GLUCOSE BLD STRIP.AUTO-MCNC: 123 MG/DL (ref 70–110)
GLUCOSE BLD STRIP.AUTO-MCNC: 125 MG/DL (ref 70–110)
GLUCOSE BLD STRIP.AUTO-MCNC: 134 MG/DL (ref 70–110)
GLUCOSE BLD STRIP.AUTO-MCNC: 140 MG/DL (ref 70–110)
GLUCOSE BLD STRIP.AUTO-MCNC: 168 MG/DL (ref 70–110)
GLUCOSE SERPL-MCNC: 105 MG/DL (ref 74–99)
GLUCOSE SERPL-MCNC: 106 MG/DL (ref 74–99)
GLUCOSE SERPL-MCNC: 108 MG/DL (ref 74–99)
GLUCOSE SERPL-MCNC: 109 MG/DL (ref 74–99)
GLUCOSE SERPL-MCNC: 143 MG/DL (ref 74–99)
HADV DNA SPEC QL NAA+PROBE: NOT DETECTED
HBA1C MFR BLD: 5.7 % (ref 4.2–5.6)
HCO3 BLD-SCNC: 13.6 MMOL/L (ref 22–26)
HCO3 BLD-SCNC: 18.4 MMOL/L (ref 22–26)
HCO3 BLD-SCNC: 18.7 MMOL/L (ref 22–26)
HCO3 BLD-SCNC: 19.7 MMOL/L (ref 22–26)
HCO3 BLD-SCNC: 21.9 MMOL/L (ref 22–26)
HCOV 229E RNA SPEC QL NAA+PROBE: NOT DETECTED
HCOV HKU1 RNA SPEC QL NAA+PROBE: NOT DETECTED
HCOV NL63 RNA SPEC QL NAA+PROBE: NOT DETECTED
HCOV OC43 RNA SPEC QL NAA+PROBE: NOT DETECTED
HCT VFR BLD AUTO: 28.8 % (ref 36–48)
HCT VFR BLD AUTO: 32 % (ref 36–48)
HCT VFR BLD AUTO: 32.9 % (ref 36–48)
HDSCOM,HDSCOM: NORMAL
HGB BLD-MCNC: 10 G/DL (ref 13–16)
HGB BLD-MCNC: 10.2 G/DL (ref 13–16)
HGB BLD-MCNC: 11.4 G/DL (ref 13–16)
HMPV RNA SPEC QL NAA+PROBE: NOT DETECTED
HPIV1 RNA SPEC QL NAA+PROBE: NOT DETECTED
HPIV2 RNA SPEC QL NAA+PROBE: NOT DETECTED
HPIV3 RNA SPEC QL NAA+PROBE: NOT DETECTED
HPIV4 RNA SPEC QL NAA+PROBE: NOT DETECTED
INR PPP: 1.6 (ref 0.8–1.2)
INR PPP: 1.6 (ref 0.8–1.2)
INR PPP: 2.2 (ref 0.8–1.2)
INSPIRATION.DURATION SETTING TIME VENT: 0.9 SEC
LACTATE BLD-SCNC: 15.68 MMOL/L (ref 0.4–2)
LACTATE SERPL-SCNC: 1.1 MMOL/L (ref 0.4–2)
LACTATE SERPL-SCNC: 1.2 MMOL/L (ref 0.4–2)
LACTATE SERPL-SCNC: 1.2 MMOL/L (ref 0.4–2)
LIPASE SERPL-CCNC: 164 U/L (ref 73–393)
LYMPHOCYTES # BLD: 0.3 K/UL (ref 0.9–3.6)
LYMPHOCYTES # BLD: 0.6 K/UL (ref 0.9–3.6)
LYMPHOCYTES # BLD: 1.7 K/UL (ref 0.9–3.6)
LYMPHOCYTES NFR BLD: 3 % (ref 21–52)
LYMPHOCYTES NFR BLD: 3 % (ref 21–52)
LYMPHOCYTES NFR BLD: 34 % (ref 21–52)
M PNEUMO DNA SPEC QL NAA+PROBE: NOT DETECTED
MAGNESIUM SERPL-MCNC: 1.9 MG/DL (ref 1.6–2.6)
MAGNESIUM SERPL-MCNC: 2.5 MG/DL (ref 1.6–2.6)
MCH RBC QN AUTO: 30 PG (ref 24–34)
MCH RBC QN AUTO: 30.2 PG (ref 24–34)
MCH RBC QN AUTO: 30.8 PG (ref 24–34)
MCHC RBC AUTO-ENTMCNC: 31.3 G/DL (ref 31–37)
MCHC RBC AUTO-ENTMCNC: 34.7 G/DL (ref 31–37)
MCHC RBC AUTO-ENTMCNC: 35.4 G/DL (ref 31–37)
MCV RBC AUTO: 84.7 FL (ref 74–97)
MCV RBC AUTO: 87 FL (ref 74–97)
MCV RBC AUTO: 98.5 FL (ref 74–97)
METAMYELOCYTES NFR BLD MANUAL: 7 %
METHADONE UR QL: NEGATIVE
MONOCYTES # BLD: 0.2 K/UL (ref 0.05–1.2)
MONOCYTES # BLD: 0.3 K/UL (ref 0.05–1.2)
MONOCYTES # BLD: 0.6 K/UL (ref 0.05–1.2)
MONOCYTES NFR BLD: 1 % (ref 3–10)
MONOCYTES NFR BLD: 5 % (ref 3–10)
MONOCYTES NFR BLD: 5 % (ref 3–10)
NEUTS BAND NFR BLD MANUAL: 2 % (ref 0–5)
NEUTS SEG # BLD: 10.6 K/UL (ref 1.8–8)
NEUTS SEG # BLD: 2.7 K/UL (ref 1.8–8)
NEUTS SEG # BLD: 20.3 K/UL (ref 1.8–8)
NEUTS SEG NFR BLD: 52 % (ref 40–73)
NEUTS SEG NFR BLD: 91 % (ref 40–73)
NEUTS SEG NFR BLD: 96 % (ref 40–73)
NITRIC:PPM ISTAT,INITR: 0 PPM
NITRIC:PPM ISTAT,INITR: 0 PPM
O2/TOTAL GAS SETTING VFR VENT: 0.8 %
O2/TOTAL GAS SETTING VFR VENT: 100 %
O2/TOTAL GAS SETTING VFR VENT: 35 %
O2/TOTAL GAS SETTING VFR VENT: 40 %
O2/TOTAL GAS SETTING VFR VENT: 70 %
OPIATES UR QL: NEGATIVE
P-R INTERVAL, ECG05: 158 MS
P-R INTERVAL, ECG05: 166 MS
P-R INTERVAL, ECG05: 226 MS
PCO2 BLD: 26.4 MMHG (ref 35–45)
PCO2 BLD: 27.8 MMHG (ref 35–45)
PCO2 BLD: 29.7 MMHG (ref 35–45)
PCO2 BLD: 37.2 MMHG (ref 35–45)
PCO2 BLD: 69 MMHG (ref 35–45)
PCP UR QL: NEGATIVE
PEEP RESPIRATORY: 5 CMH2O
PEEP RESPIRATORY: 6 CMH2O
PH BLD: 6.89 [PH] (ref 7.35–7.45)
PH BLD: 7.3 [PH] (ref 7.35–7.45)
PH BLD: 7.41 [PH] (ref 7.35–7.45)
PH BLD: 7.47 [PH] (ref 7.35–7.45)
PH BLD: 7.5 [PH] (ref 7.35–7.45)
PHOSPHATE SERPL-MCNC: 3.4 MG/DL (ref 2.5–4.9)
PHOSPHATE SERPL-MCNC: 5.6 MG/DL (ref 2.5–4.9)
PHOSPHATE SERPL-MCNC: 6.8 MG/DL (ref 2.5–4.9)
PHOSPHATE SERPL-MCNC: 8.1 MG/DL (ref 2.5–4.9)
PIP ISTAT,IPIP: 16
PIP ISTAT,IPIP: 16
PIP ISTAT,IPIP: 23
PIP ISTAT,IPIP: 23
PIP ISTAT,IPIP: 27
PLATELET # BLD AUTO: 223 K/UL (ref 135–420)
PLATELET # BLD AUTO: 238 K/UL (ref 135–420)
PLATELET # BLD AUTO: 242 K/UL (ref 135–420)
PLATELET COMMENTS,PCOM: ABNORMAL
PLATELET COMMENTS,PCOM: ABNORMAL
PMV BLD AUTO: 10.1 FL (ref 9.2–11.8)
PMV BLD AUTO: 9.8 FL (ref 9.2–11.8)
PMV BLD AUTO: 9.9 FL (ref 9.2–11.8)
PO2 BLD: 185 MMHG (ref 80–100)
PO2 BLD: 229 MMHG (ref 80–100)
PO2 BLD: 368 MMHG (ref 80–100)
PO2 BLD: 93 MMHG (ref 80–100)
PO2 BLD: 95 MMHG (ref 80–100)
POTASSIUM SERPL-SCNC: 2.7 MMOL/L (ref 3.5–5.5)
POTASSIUM SERPL-SCNC: 3.1 MMOL/L (ref 3.5–5.5)
POTASSIUM SERPL-SCNC: 3.3 MMOL/L (ref 3.5–5.5)
POTASSIUM SERPL-SCNC: 3.4 MMOL/L (ref 3.5–5.5)
POTASSIUM SERPL-SCNC: 3.8 MMOL/L (ref 3.5–5.5)
PRESSURE CONTROL, IPC: YES
PROCALCITONIN SERPL-MCNC: <0.05 NG/ML
PROT SERPL-MCNC: 6.6 G/DL (ref 6.4–8.2)
PROTHROMBIN TIME: 18.3 SEC (ref 11.5–15.2)
PROTHROMBIN TIME: 18.4 SEC (ref 11.5–15.2)
PROTHROMBIN TIME: 23.5 SEC (ref 11.5–15.2)
Q-T INTERVAL, ECG07: 426 MS
Q-T INTERVAL, ECG07: 504 MS
Q-T INTERVAL, ECG07: 642 MS
QRS DURATION, ECG06: 110 MS
QRS DURATION, ECG06: 112 MS
QRS DURATION, ECG06: 174 MS
QTC CALCULATION (BEZET), ECG08: 521 MS
QTC CALCULATION (BEZET), ECG08: 614 MS
QTC CALCULATION (BEZET), ECG08: 636 MS
RBC # BLD AUTO: 3.25 M/UL (ref 4.35–5.65)
RBC # BLD AUTO: 3.4 M/UL (ref 4.35–5.65)
RBC # BLD AUTO: 3.78 M/UL (ref 4.35–5.65)
RBC MORPH BLD: ABNORMAL
RSV RNA SPEC QL NAA+PROBE: NOT DETECTED
RV+EV RNA SPEC QL NAA+PROBE: NOT DETECTED
SALICYLATES SERPL-MCNC: <1.7 MG/DL (ref 2.8–20)
SAO2 % BLD: 100 % (ref 92–97)
SAO2 % BLD: 97 % (ref 92–97)
SAO2 % BLD: 98 % (ref 92–97)
SARS-COV-2 PCR, COVPCR: NOT DETECTED
SERVICE CMNT-IMP: ABNORMAL
SODIUM SERPL-SCNC: 128 MMOL/L (ref 136–145)
SODIUM SERPL-SCNC: 131 MMOL/L (ref 136–145)
SODIUM SERPL-SCNC: 132 MMOL/L (ref 136–145)
SPECIMEN TYPE: ABNORMAL
T4 FREE SERPL-MCNC: 1.4 NG/DL (ref 0.7–1.5)
TOTAL RESP. RATE, ITRR: 18
TOTAL RESP. RATE, ITRR: 20
TOTAL RESP. RATE, ITRR: 20
TOTAL RESP. RATE, ITRR: 24
TOTAL RESP. RATE, ITRR: 24
TROPONIN I SERPL-MCNC: 0.14 NG/ML (ref 0–0.04)
TROPONIN I SERPL-MCNC: 0.6 NG/ML (ref 0–0.04)
TROPONIN I SERPL-MCNC: 0.86 NG/ML (ref 0–0.04)
TROPONIN I SERPL-MCNC: 2.11 NG/ML (ref 0–0.04)
TSH SERPL DL<=0.05 MIU/L-ACNC: 5.66 UIU/ML (ref 0.36–3.74)
VENTILATION MODE VENT: ABNORMAL
VENTRICULAR RATE, ECG03: 55 BPM
VENTRICULAR RATE, ECG03: 90 BPM
VENTRICULAR RATE, ECG03: 96 BPM
VT SETTING VENT: 0 ML
WBC # BLD AUTO: 11.6 K/UL (ref 4.6–13.2)
WBC # BLD AUTO: 21.1 K/UL (ref 4.6–13.2)
WBC # BLD AUTO: 5 K/UL (ref 4.6–13.2)

## 2021-01-01 PROCEDURE — 74011000250 HC RX REV CODE- 250: Performed by: PHYSICIAN ASSISTANT

## 2021-01-01 PROCEDURE — 96374 THER/PROPH/DIAG INJ IV PUSH: CPT

## 2021-01-01 PROCEDURE — 74018 RADEX ABDOMEN 1 VIEW: CPT

## 2021-01-01 PROCEDURE — 83735 ASSAY OF MAGNESIUM: CPT

## 2021-01-01 PROCEDURE — 36600 WITHDRAWAL OF ARTERIAL BLOOD: CPT

## 2021-01-01 PROCEDURE — 94003 VENT MGMT INPAT SUBQ DAY: CPT

## 2021-01-01 PROCEDURE — 84439 ASSAY OF FREE THYROXINE: CPT

## 2021-01-01 PROCEDURE — 71045 X-RAY EXAM CHEST 1 VIEW: CPT

## 2021-01-01 PROCEDURE — 77010033678 HC OXYGEN DAILY

## 2021-01-01 PROCEDURE — 74011000250 HC RX REV CODE- 250: Performed by: NURSE PRACTITIONER

## 2021-01-01 PROCEDURE — 80179 DRUG ASSAY SALICYLATE: CPT

## 2021-01-01 PROCEDURE — 70450 CT HEAD/BRAIN W/O DYE: CPT

## 2021-01-01 PROCEDURE — 84443 ASSAY THYROID STIM HORMONE: CPT

## 2021-01-01 PROCEDURE — 84100 ASSAY OF PHOSPHORUS: CPT

## 2021-01-01 PROCEDURE — 74011250636 HC RX REV CODE- 250/636: Performed by: STUDENT IN AN ORGANIZED HEALTH CARE EDUCATION/TRAINING PROGRAM

## 2021-01-01 PROCEDURE — 74011000258 HC RX REV CODE- 258: Performed by: STUDENT IN AN ORGANIZED HEALTH CARE EDUCATION/TRAINING PROGRAM

## 2021-01-01 PROCEDURE — 80048 BASIC METABOLIC PNL TOTAL CA: CPT

## 2021-01-01 PROCEDURE — 82962 GLUCOSE BLOOD TEST: CPT

## 2021-01-01 PROCEDURE — 83605 ASSAY OF LACTIC ACID: CPT

## 2021-01-01 PROCEDURE — 80143 DRUG ASSAY ACETAMINOPHEN: CPT

## 2021-01-01 PROCEDURE — 74011000636 HC RX REV CODE- 636: Performed by: STUDENT IN AN ORGANIZED HEALTH CARE EDUCATION/TRAINING PROGRAM

## 2021-01-01 PROCEDURE — 72125 CT NECK SPINE W/O DYE: CPT

## 2021-01-01 PROCEDURE — 0202U NFCT DS 22 TRGT SARS-COV-2: CPT

## 2021-01-01 PROCEDURE — 65610000006 HC RM INTENSIVE CARE

## 2021-01-01 PROCEDURE — 99232 SBSQ HOSP IP/OBS MODERATE 35: CPT | Performed by: INTERNAL MEDICINE

## 2021-01-01 PROCEDURE — 85610 PROTHROMBIN TIME: CPT

## 2021-01-01 PROCEDURE — 74011250636 HC RX REV CODE- 250/636

## 2021-01-01 PROCEDURE — 82077 ASSAY SPEC XCP UR&BREATH IA: CPT

## 2021-01-01 PROCEDURE — 84145 PROCALCITONIN (PCT): CPT

## 2021-01-01 PROCEDURE — 36415 COLL VENOUS BLD VENIPUNCTURE: CPT

## 2021-01-01 PROCEDURE — 74011250637 HC RX REV CODE- 250/637: Performed by: PHYSICIAN ASSISTANT

## 2021-01-01 PROCEDURE — 93005 ELECTROCARDIOGRAM TRACING: CPT

## 2021-01-01 PROCEDURE — 85025 COMPLETE CBC W/AUTO DIFF WBC: CPT

## 2021-01-01 PROCEDURE — 99291 CRITICAL CARE FIRST HOUR: CPT | Performed by: NURSE PRACTITIONER

## 2021-01-01 PROCEDURE — 99291 CRITICAL CARE FIRST HOUR: CPT | Performed by: PHYSICIAN ASSISTANT

## 2021-01-01 PROCEDURE — 82550 ASSAY OF CK (CPK): CPT

## 2021-01-01 PROCEDURE — 74011000250 HC RX REV CODE- 250: Performed by: EMERGENCY MEDICINE

## 2021-01-01 PROCEDURE — 87040 BLOOD CULTURE FOR BACTERIA: CPT

## 2021-01-01 PROCEDURE — 74011250636 HC RX REV CODE- 250/636: Performed by: NURSE PRACTITIONER

## 2021-01-01 PROCEDURE — 74011250636 HC RX REV CODE- 250/636: Performed by: PSYCHIATRY & NEUROLOGY

## 2021-01-01 PROCEDURE — 74011250636 HC RX REV CODE- 250/636: Performed by: PHYSICIAN ASSISTANT

## 2021-01-01 PROCEDURE — 99222 1ST HOSP IP/OBS MODERATE 55: CPT | Performed by: PSYCHIATRY & NEUROLOGY

## 2021-01-01 PROCEDURE — 2709999900 HC NON-CHARGEABLE SUPPLY

## 2021-01-01 PROCEDURE — 99285 EMERGENCY DEPT VISIT HI MDM: CPT

## 2021-01-01 PROCEDURE — 96376 TX/PRO/DX INJ SAME DRUG ADON: CPT

## 2021-01-01 PROCEDURE — 85730 THROMBOPLASTIN TIME PARTIAL: CPT

## 2021-01-01 PROCEDURE — 94002 VENT MGMT INPAT INIT DAY: CPT

## 2021-01-01 PROCEDURE — 84484 ASSAY OF TROPONIN QUANT: CPT

## 2021-01-01 PROCEDURE — 83880 ASSAY OF NATRIURETIC PEPTIDE: CPT

## 2021-01-01 PROCEDURE — 83690 ASSAY OF LIPASE: CPT

## 2021-01-01 PROCEDURE — 85379 FIBRIN DEGRADATION QUANT: CPT

## 2021-01-01 PROCEDURE — 5A1945Z RESPIRATORY VENTILATION, 24-96 CONSECUTIVE HOURS: ICD-10-PCS | Performed by: STUDENT IN AN ORGANIZED HEALTH CARE EDUCATION/TRAINING PROGRAM

## 2021-01-01 PROCEDURE — 74011250636 HC RX REV CODE- 250/636: Performed by: INTERNAL MEDICINE

## 2021-01-01 PROCEDURE — 31500 INSERT EMERGENCY AIRWAY: CPT

## 2021-01-01 PROCEDURE — 82803 BLOOD GASES ANY COMBINATION: CPT

## 2021-01-01 PROCEDURE — 94762 N-INVAS EAR/PLS OXIMTRY CONT: CPT

## 2021-01-01 PROCEDURE — 93306 TTE W/DOPPLER COMPLETE: CPT

## 2021-01-01 PROCEDURE — 99238 HOSP IP/OBS DSCHRG MGMT 30/<: CPT | Performed by: PHYSICIAN ASSISTANT

## 2021-01-01 PROCEDURE — 96375 TX/PRO/DX INJ NEW DRUG ADDON: CPT

## 2021-01-01 PROCEDURE — 51702 INSERT TEMP BLADDER CATH: CPT

## 2021-01-01 PROCEDURE — 74011250636 HC RX REV CODE- 250/636: Performed by: EMERGENCY MEDICINE

## 2021-01-01 PROCEDURE — 80307 DRUG TEST PRSMV CHEM ANLYZR: CPT

## 2021-01-01 PROCEDURE — 71275 CT ANGIOGRAPHY CHEST: CPT

## 2021-01-01 PROCEDURE — 80053 COMPREHEN METABOLIC PANEL: CPT

## 2021-01-01 PROCEDURE — 83036 HEMOGLOBIN GLYCOSYLATED A1C: CPT

## 2021-01-01 RX ORDER — MIDAZOLAM HYDROCHLORIDE 1 MG/ML
5 INJECTION, SOLUTION INTRAMUSCULAR; INTRAVENOUS ONCE
Status: COMPLETED | OUTPATIENT
Start: 2021-01-01 | End: 2021-01-01

## 2021-01-01 RX ORDER — ACETAMINOPHEN 650 MG/1
650 SUPPOSITORY RECTAL
Status: DISCONTINUED | OUTPATIENT
Start: 2021-01-01 | End: 2021-01-01

## 2021-01-01 RX ORDER — MIDAZOLAM HYDROCHLORIDE 1 MG/ML
INJECTION, SOLUTION INTRAMUSCULAR; INTRAVENOUS
Status: COMPLETED
Start: 2021-01-01 | End: 2021-01-01

## 2021-01-01 RX ORDER — MAGNESIUM SULFATE HEPTAHYDRATE 40 MG/ML
2 INJECTION, SOLUTION INTRAVENOUS ONCE
Status: COMPLETED | OUTPATIENT
Start: 2021-01-01 | End: 2021-01-01

## 2021-01-01 RX ORDER — FENTANYL CITRATE 50 UG/ML
50-100 INJECTION, SOLUTION INTRAMUSCULAR; INTRAVENOUS
Status: DISCONTINUED | OUTPATIENT
Start: 2021-01-01 | End: 2021-01-01

## 2021-01-01 RX ORDER — DEXTROSE 50 % IN WATER (D50W) INTRAVENOUS SYRINGE
25-50 AS NEEDED
Status: DISCONTINUED | OUTPATIENT
Start: 2021-01-01 | End: 2021-01-01

## 2021-01-01 RX ORDER — EPINEPHRINE 0.1 MG/ML
INJECTION INTRACARDIAC; INTRAVENOUS
Status: COMPLETED | OUTPATIENT
Start: 2021-01-01 | End: 2021-01-01

## 2021-01-01 RX ORDER — LORAZEPAM 2 MG/ML
1 INJECTION INTRAMUSCULAR
Status: DISCONTINUED | OUTPATIENT
Start: 2021-01-01 | End: 2021-04-20 | Stop reason: HOSPADM

## 2021-01-01 RX ORDER — POTASSIUM CHLORIDE 7.45 MG/ML
INJECTION INTRAVENOUS
Status: COMPLETED
Start: 2021-01-01 | End: 2021-01-01

## 2021-01-01 RX ORDER — POLYETHYLENE GLYCOL 3350 17 G/17G
17 POWDER, FOR SOLUTION ORAL DAILY PRN
Status: DISCONTINUED | OUTPATIENT
Start: 2021-01-01 | End: 2021-01-01

## 2021-01-01 RX ORDER — MIDAZOLAM HYDROCHLORIDE 1 MG/ML
INJECTION, SOLUTION INTRAMUSCULAR; INTRAVENOUS
Status: DISPENSED
Start: 2021-01-01 | End: 2021-01-01

## 2021-01-01 RX ORDER — POTASSIUM CHLORIDE 7.45 MG/ML
10 INJECTION INTRAVENOUS
Status: COMPLETED | OUTPATIENT
Start: 2021-01-01 | End: 2021-01-01

## 2021-01-01 RX ORDER — PROPOFOL 10 MG/ML
0-50 VIAL (ML) INTRAVENOUS
Status: DISCONTINUED | OUTPATIENT
Start: 2021-01-01 | End: 2021-01-01

## 2021-01-01 RX ORDER — HYDRALAZINE HYDROCHLORIDE 50 MG/1
50 TABLET, FILM COATED ORAL 3 TIMES DAILY
COMMUNITY

## 2021-01-01 RX ORDER — FENTANYL CITRATE 50 UG/ML
INJECTION, SOLUTION INTRAMUSCULAR; INTRAVENOUS
Status: COMPLETED
Start: 2021-01-01 | End: 2021-01-01

## 2021-01-01 RX ORDER — FENTANYL CITRATE 50 UG/ML
INJECTION, SOLUTION INTRAMUSCULAR; INTRAVENOUS
Status: DISPENSED
Start: 2021-01-01 | End: 2021-01-01

## 2021-01-01 RX ORDER — ENOXAPARIN SODIUM 100 MG/ML
40 INJECTION SUBCUTANEOUS DAILY
Status: DISCONTINUED | OUTPATIENT
Start: 2021-01-01 | End: 2021-01-01

## 2021-01-01 RX ORDER — ONDANSETRON 2 MG/ML
4 INJECTION INTRAMUSCULAR; INTRAVENOUS
Status: DISCONTINUED | OUTPATIENT
Start: 2021-01-01 | End: 2021-01-01

## 2021-01-01 RX ORDER — FENTANYL CITRATE 50 UG/ML
100 INJECTION, SOLUTION INTRAMUSCULAR; INTRAVENOUS
Status: COMPLETED | OUTPATIENT
Start: 2021-01-01 | End: 2021-01-01

## 2021-01-01 RX ORDER — MAGNESIUM SULFATE 1 G/100ML
1 INJECTION INTRAVENOUS ONCE
Status: COMPLETED | OUTPATIENT
Start: 2021-01-01 | End: 2021-01-01

## 2021-01-01 RX ORDER — MAGNESIUM SULFATE 100 %
4 CRYSTALS MISCELLANEOUS AS NEEDED
Status: DISCONTINUED | OUTPATIENT
Start: 2021-01-01 | End: 2021-01-01

## 2021-01-01 RX ORDER — HEPARIN SODIUM 5000 [USP'U]/ML
5000 INJECTION, SOLUTION INTRAVENOUS; SUBCUTANEOUS EVERY 8 HOURS
Status: DISCONTINUED | OUTPATIENT
Start: 2021-01-01 | End: 2021-01-01

## 2021-01-01 RX ORDER — CHLORHEXIDINE GLUCONATE 1.2 MG/ML
10 RINSE ORAL EVERY 12 HOURS
Status: DISCONTINUED | OUTPATIENT
Start: 2021-01-01 | End: 2021-01-01

## 2021-01-01 RX ORDER — PROMETHAZINE HYDROCHLORIDE 25 MG/1
12.5 TABLET ORAL
Status: DISCONTINUED | OUTPATIENT
Start: 2021-01-01 | End: 2021-01-01

## 2021-01-01 RX ORDER — MIDAZOLAM IN 0.9 % SOD.CHLORID 1 MG/ML
0-10 PLASTIC BAG, INJECTION (ML) INTRAVENOUS
Status: DISCONTINUED | OUTPATIENT
Start: 2021-01-01 | End: 2021-01-01

## 2021-01-01 RX ORDER — ACETAMINOPHEN 325 MG/1
650 TABLET ORAL
Status: DISCONTINUED | OUTPATIENT
Start: 2021-01-01 | End: 2021-01-01

## 2021-01-01 RX ORDER — CALCIUM CHLORIDE INJECTION 100 MG/ML
INJECTION, SOLUTION INTRAVENOUS
Status: COMPLETED | OUTPATIENT
Start: 2021-01-01 | End: 2021-01-01

## 2021-01-01 RX ORDER — GLYCOPYRROLATE 0.2 MG/ML
0.2 INJECTION INTRAMUSCULAR; INTRAVENOUS
Status: DISCONTINUED | OUTPATIENT
Start: 2021-01-01 | End: 2021-04-20 | Stop reason: HOSPADM

## 2021-01-01 RX ORDER — SODIUM CHLORIDE 0.9 % (FLUSH) 0.9 %
5-40 SYRINGE (ML) INJECTION AS NEEDED
Status: DISCONTINUED | OUTPATIENT
Start: 2021-01-01 | End: 2021-01-01

## 2021-01-01 RX ORDER — MIDAZOLAM HYDROCHLORIDE 1 MG/ML
5 INJECTION, SOLUTION INTRAMUSCULAR; INTRAVENOUS
Status: COMPLETED | OUTPATIENT
Start: 2021-01-01 | End: 2021-01-01

## 2021-01-01 RX ORDER — MIDAZOLAM HYDROCHLORIDE 1 MG/ML
1-2 INJECTION, SOLUTION INTRAMUSCULAR; INTRAVENOUS
Status: DISCONTINUED | OUTPATIENT
Start: 2021-01-01 | End: 2021-01-01

## 2021-01-01 RX ORDER — LEVETIRACETAM 10 MG/ML
1000 INJECTION INTRAVASCULAR EVERY 12 HOURS
Status: DISCONTINUED | OUTPATIENT
Start: 2021-01-01 | End: 2021-01-01

## 2021-01-01 RX ORDER — FENTANYL CITRATE 50 UG/ML
INJECTION, SOLUTION INTRAMUSCULAR; INTRAVENOUS
Status: DISCONTINUED
Start: 2021-01-01 | End: 2021-01-01

## 2021-01-01 RX ORDER — MORPHINE SULFATE 2 MG/ML
2 INJECTION, SOLUTION INTRAMUSCULAR; INTRAVENOUS
Status: DISCONTINUED | OUTPATIENT
Start: 2021-01-01 | End: 2021-04-20 | Stop reason: HOSPADM

## 2021-01-01 RX ORDER — FENTANYL CITRATE 50 UG/ML
200 INJECTION, SOLUTION INTRAMUSCULAR; INTRAVENOUS
Status: COMPLETED | OUTPATIENT
Start: 2021-01-01 | End: 2021-01-01

## 2021-01-01 RX ORDER — SODIUM CHLORIDE 0.9 % (FLUSH) 0.9 %
5-40 SYRINGE (ML) INJECTION EVERY 8 HOURS
Status: DISCONTINUED | OUTPATIENT
Start: 2021-01-01 | End: 2021-01-01

## 2021-01-01 RX ADMIN — POTASSIUM BICARBONATE 20 MEQ: 782 TABLET, EFFERVESCENT ORAL at 06:28

## 2021-01-01 RX ADMIN — POTASSIUM CHLORIDE 10 MEQ: 7.46 INJECTION, SOLUTION INTRAVENOUS at 07:40

## 2021-01-01 RX ADMIN — MIDAZOLAM 5 MG/HR: 5 INJECTION INTRAMUSCULAR; INTRAVENOUS at 05:30

## 2021-01-01 RX ADMIN — POTASSIUM BICARBONATE 40 MEQ: 782 TABLET, EFFERVESCENT ORAL at 06:30

## 2021-01-01 RX ADMIN — POTASSIUM CHLORIDE 10 MEQ: 7.46 INJECTION, SOLUTION INTRAVENOUS at 19:00

## 2021-01-01 RX ADMIN — IOPAMIDOL 85 ML: 755 INJECTION, SOLUTION INTRAVENOUS at 22:19

## 2021-01-01 RX ADMIN — MIDAZOLAM 10 MG/HR: 5 INJECTION INTRAMUSCULAR; INTRAVENOUS at 20:11

## 2021-01-01 RX ADMIN — PROPOFOL 20 MCG/KG/MIN: 10 INJECTION, EMULSION INTRAVENOUS at 12:45

## 2021-01-01 RX ADMIN — POTASSIUM CHLORIDE 10 MEQ: 7.46 INJECTION, SOLUTION INTRAVENOUS at 09:00

## 2021-01-01 RX ADMIN — CALCIUM CHLORIDE 1 G: 100 INJECTION INTRAVENOUS; INTRAVENTRICULAR at 16:38

## 2021-01-01 RX ADMIN — FAMOTIDINE 20 MG: 10 INJECTION INTRAVENOUS at 08:49

## 2021-01-01 RX ADMIN — FAMOTIDINE 20 MG: 10 INJECTION INTRAVENOUS at 08:57

## 2021-01-01 RX ADMIN — MIDAZOLAM 5 MG: 1 INJECTION INTRAMUSCULAR; INTRAVENOUS at 17:39

## 2021-01-01 RX ADMIN — HEPARIN SODIUM 5000 UNITS: 5000 INJECTION INTRAVENOUS; SUBCUTANEOUS at 06:29

## 2021-01-01 RX ADMIN — MIDAZOLAM 2 MG: 1 INJECTION INTRAMUSCULAR; INTRAVENOUS at 22:44

## 2021-01-01 RX ADMIN — HEPARIN SODIUM 5000 UNITS: 5000 INJECTION INTRAVENOUS; SUBCUTANEOUS at 15:00

## 2021-01-01 RX ADMIN — MIDAZOLAM 5 MG: 1 INJECTION INTRAMUSCULAR; INTRAVENOUS at 18:28

## 2021-01-01 RX ADMIN — PROPOFOL 10 MCG/KG/MIN: 10 INJECTION, EMULSION INTRAVENOUS at 20:42

## 2021-01-01 RX ADMIN — FENTANYL CITRATE 200 MCG: 50 INJECTION, SOLUTION INTRAMUSCULAR; INTRAVENOUS at 17:54

## 2021-01-01 RX ADMIN — GLYCOPYRROLATE 0.2 MG: 0.2 INJECTION INTRAMUSCULAR; INTRAVENOUS at 14:00

## 2021-01-01 RX ADMIN — Medication 10 ML: at 21:54

## 2021-01-01 RX ADMIN — MORPHINE SULFATE 2 MG: 2 INJECTION, SOLUTION INTRAMUSCULAR; INTRAVENOUS at 14:30

## 2021-01-01 RX ADMIN — CHLORHEXIDINE GLUCONATE 0.12% ORAL RINSE 10 ML: 1.2 LIQUID ORAL at 21:34

## 2021-01-01 RX ADMIN — POTASSIUM CHLORIDE 10 MEQ: 7.46 INJECTION, SOLUTION INTRAVENOUS at 21:33

## 2021-01-01 RX ADMIN — POTASSIUM CHLORIDE 10 MEQ: 7.46 INJECTION, SOLUTION INTRAVENOUS at 10:00

## 2021-01-01 RX ADMIN — LORAZEPAM 1 MG: 2 INJECTION INTRAMUSCULAR; INTRAVENOUS at 17:53

## 2021-01-01 RX ADMIN — Medication 1 MG: at 16:37

## 2021-01-01 RX ADMIN — Medication 10 ML: at 18:58

## 2021-01-01 RX ADMIN — LORAZEPAM 1 MG: 2 INJECTION INTRAMUSCULAR; INTRAVENOUS at 14:19

## 2021-01-01 RX ADMIN — POTASSIUM CHLORIDE 10 MEQ: 7.46 INJECTION, SOLUTION INTRAVENOUS at 11:00

## 2021-01-01 RX ADMIN — MORPHINE SULFATE 2 MG: 2 INJECTION, SOLUTION INTRAMUSCULAR; INTRAVENOUS at 13:50

## 2021-01-01 RX ADMIN — Medication 10 ML: at 21:36

## 2021-01-01 RX ADMIN — Medication 10 ML: at 06:30

## 2021-01-01 RX ADMIN — POTASSIUM CHLORIDE 10 MEQ: 7.46 INJECTION, SOLUTION INTRAVENOUS at 20:35

## 2021-01-01 RX ADMIN — POTASSIUM CHLORIDE 10 MEQ: 7.46 INJECTION, SOLUTION INTRAVENOUS at 22:29

## 2021-01-01 RX ADMIN — Medication 10 ML: at 18:00

## 2021-01-01 RX ADMIN — MIDAZOLAM 8 MG/HR: 5 INJECTION INTRAMUSCULAR; INTRAVENOUS at 19:30

## 2021-01-01 RX ADMIN — CHLORHEXIDINE GLUCONATE 0.12% ORAL RINSE 10 ML: 1.2 LIQUID ORAL at 09:00

## 2021-01-01 RX ADMIN — MIDAZOLAM HYDROCHLORIDE 5 MG: 1 INJECTION, SOLUTION INTRAMUSCULAR; INTRAVENOUS at 17:39

## 2021-01-01 RX ADMIN — Medication 10 ML: at 06:31

## 2021-01-01 RX ADMIN — MIDAZOLAM 2 MG/HR: 5 INJECTION INTRAMUSCULAR; INTRAVENOUS at 18:35

## 2021-01-01 RX ADMIN — HEPARIN SODIUM 5000 UNITS: 5000 INJECTION INTRAVENOUS; SUBCUTANEOUS at 21:35

## 2021-01-01 RX ADMIN — FAMOTIDINE 20 MG: 10 INJECTION INTRAVENOUS at 21:34

## 2021-01-01 RX ADMIN — PROPOFOL 20 MCG/KG/MIN: 10 INJECTION, EMULSION INTRAVENOUS at 22:29

## 2021-01-01 RX ADMIN — MIDAZOLAM 5 MG: 1 INJECTION INTRAMUSCULAR; INTRAVENOUS at 17:55

## 2021-01-01 RX ADMIN — LEVETIRACETAM 1000 MG: 1000 INJECTION, SOLUTION INTRAVENOUS at 21:33

## 2021-01-01 RX ADMIN — MORPHINE SULFATE 2 MG: 2 INJECTION, SOLUTION INTRAMUSCULAR; INTRAVENOUS at 17:53

## 2021-01-01 RX ADMIN — MIDAZOLAM 5 MG/HR: 5 INJECTION INTRAMUSCULAR; INTRAVENOUS at 18:48

## 2021-01-01 RX ADMIN — LEVETIRACETAM 1000 MG: 1000 INJECTION, SOLUTION INTRAVENOUS at 09:03

## 2021-01-01 RX ADMIN — FENTANYL CITRATE 100 MCG: 50 INJECTION, SOLUTION INTRAMUSCULAR; INTRAVENOUS at 12:04

## 2021-01-01 RX ADMIN — MAGNESIUM SULFATE HEPTAHYDRATE 1 G: 1 INJECTION, SOLUTION INTRAVENOUS at 12:21

## 2021-01-01 RX ADMIN — FENTANYL CITRATE 100 MCG: 50 INJECTION, SOLUTION INTRAMUSCULAR; INTRAVENOUS at 13:25

## 2021-01-01 RX ADMIN — FENTANYL CITRATE 100 MCG: 50 INJECTION, SOLUTION INTRAMUSCULAR; INTRAVENOUS at 17:39

## 2021-01-01 RX ADMIN — Medication 10 ML: at 21:53

## 2021-01-01 RX ADMIN — MORPHINE SULFATE 2 MG: 2 INJECTION, SOLUTION INTRAMUSCULAR; INTRAVENOUS at 15:34

## 2021-01-01 RX ADMIN — PROPOFOL 40 MCG/KG/MIN: 10 INJECTION, EMULSION INTRAVENOUS at 03:35

## 2021-01-01 RX ADMIN — MAGNESIUM SULFATE HEPTAHYDRATE 2 G: 40 INJECTION, SOLUTION INTRAVENOUS at 07:00

## 2021-01-01 RX ADMIN — FAMOTIDINE 20 MG: 10 INJECTION INTRAVENOUS at 22:44

## 2021-01-01 RX ADMIN — CHLORHEXIDINE GLUCONATE 0.12% ORAL RINSE 10 ML: 1.2 LIQUID ORAL at 08:57

## 2021-01-01 RX ADMIN — Medication 10 ML: at 14:00

## 2021-01-01 RX ADMIN — LEVETIRACETAM 2500 MG: 100 INJECTION, SOLUTION INTRAVENOUS at 13:00

## 2021-01-01 RX ADMIN — MIDAZOLAM 2 MG/HR: 5 INJECTION INTRAMUSCULAR; INTRAVENOUS at 02:14

## 2021-01-01 RX ADMIN — POTASSIUM CHLORIDE 10 MEQ: 7.46 INJECTION, SOLUTION INTRAVENOUS at 06:30

## 2021-01-01 RX ADMIN — MORPHINE SULFATE 2 MG: 2 INJECTION, SOLUTION INTRAMUSCULAR; INTRAVENOUS at 14:43

## 2021-04-17 PROBLEM — I46.9 CARDIAC ARREST (HCC): Status: ACTIVE | Noted: 2021-01-01
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
## 2021-04-17 NOTE — PROGRESS NOTES
responded to [employer] for  Kevin López, who is a 62 y.o.,male, The  provided the following Interventions: 
Was available to provide crisis pastoral care and pastoral support. Offered prayers on behalf for the patient while staff attended to him. Asked tech and physician to contact me if family arrives and desires a . Chart reviewed. Assessment: 
There are no spiritual or [employer]alism issues which require intervention at this time. Plan: 
Chaplains will continue to follow and will provide pastoral care on an as needed/requested basis.  recommends bedside caregivers page  on duty if patient shows signs of acute spiritual or emotional distress. Chaplain Fu[facility]  
[phone]

## 2021-04-17 NOTE — PROGRESS NOTES
ETT advanced from 23 @ lip to 26 @ lip 
 
 04/17/21 1720 Patient Observations Pulse (Heart Rate) 88 Resp Rate 26  
O2 Sat (%) 100 % ETCO2 (mmHg) 22 mmHg Airway - Continuous Aspiration of Subglottic Secretions (ZITA) Tube 04/17/21 Oral  
Placement Date/Time: 04/17/21 1430   Number of Attempts: 1  Inserted By: EMT  Present on Admission/Arrival: Yes  Location: Oral  Placement Verified: Auscultation;BBS;Chest x-ray;EtCO2  Airway Types: Endotracheal, cuffed  Airway Tube Size: 7 mm Insertion Depth (cm) 26 cm Line Jimi Lips Side Secured Centered Cuff Pressure  
(mlt) Site Assessment Clean, dry, & intact Respiratory Respiratory (WDL) WDL Ventilator Initiate/Discontinue Ventilator Initiate Yes Vent Settings FIO2 (%) 80 % SpO2/FIO2 Ratio 125 CMV Rate Set 24 Back-Up Rate 18 PC Set 16 PEEP/VENT (cm H2O) 6 cm H20 Insp Time (sec) 0.9 sec Insp Rise Time % 50 % Flow Trigger 3 Ventilator Measurements Resp Rate Observed 26 Vt Exhaled (Machine Breath) (ml) 589 ml Ve Observed (l/min) 14.3 l/min PIP Observed (cm H2O) 23 cm H2O Plateau Pressure (cm H2O) 18 cm H2O  
MAP (cm H2O) 14 I:E Ratio Actual 1:1.8 Static Compliance (ml/cm H20) 50 ml/cm H20 Safety & Alarms Circuit Temperature  
(hme) Backup Mode Checked/Apnea Yes Pressure Max 40 cm H2O Ve Min 2 Ve Max 20 Vt Min 200 ml  
RR Max 40 Ambu Bag Yes Ambu Mask Yes Airway Procedures  
$$ Airway Procedures Intubation Vent Method/Mode Ventilation Method Conventional  
Ventilator Mode Assist control;Pressure control  
$$ Ventilator Initial Initial Vent Day

## 2021-04-17 NOTE — ED TRIAGE NOTES
Per Medics: \" Patient was found unresponsive on his front porch. Mother Bart Rand ([phone]) was on scene as well as brother Shireen Mills ([phone]). Patient has a history of Schizophrenia and DM. Patient was given 6 Epi in the field, 2 Narcan, 1 Amp sodium bicarb, and 1L Bolus of fluid. \"

## 2021-04-18 NOTE — PROGRESS NOTES
Problem: Non-Violent Restraints Goal: Removal from restraints as soon as assessed to be safe Outcome: Progressing Towards Goal 
Goal: No harm/injury to patient while restraints in use Outcome: Progressing Towards Goal 
Goal: Patient's dignity will be maintained Outcome: Progressing Towards Goal 
Goal: Patient Interventions Outcome: Progressing Towards Goal

## 2021-04-18 NOTE — ROUTINE PROCESS
Patient taken to CT via portable vent. No issues to report. Returned to ED and placed back to ventilator.

## 2021-04-18 NOTE — ROUTINE PROCESS
On Saturday, April 17 2021, at 2358 hours, Mr. Santhosh De Oliveira was placed on a heater via his circuit, per departmental policy. I will administer quality respiratory care until properly relived.

## 2021-04-18 NOTE — PROGRESS NOTES
[facility], [address] [place] Mary Ellen Schwartz Phone:[phone] Fax: [phone] CARDIOLOGY PROGRESS NOTE Impressions:  
Cardiac Arrest PEA on scene Troponin I 2.11 > 0.14 pro BNP 5823 Respiratory Failure s/p Mechanical Ventilation Cardiomyopathy LVEF 45% by bedside echocardiogram 4/17/21 Heart Failure with reduce LV Function New nonischemic cardiomyopathy with EF 25% by echo 10/28/20. S/p Cardiac cath 10/29/20 with patent coronaries. 
-Echo 10/28/20, cannot exclude mitral valve mass although given clinical scenario, low suspicion for endocarditis, follow-up blood culttures, ID following. Plan was for WADE for completeness but patient has refused any further testing at this time. 
-Presented due to dyspnea, atypical chest pain.  Both improved.   
-Bilateral infiltrates by chest CTA 10/26/2020, no PE  
-Hx HTN 
-Hx tobacco abuse, reports plan to quit once discharged -Schizophrenia - Hypokalemia Plan:  
Telemetry 2 D Echocardiogram, preliminary echocardiogram showed LVEF 45%. Supplement Potassium Magnesium 1 gram IV X 1 Consider EP Consult Total critical care time: 13 minutes. ASSESSMENT: 
Hospital Problems  Never Reviewed Codes Class Noted POA Cardiac arrest [facility]) ICD-10-CM: I46.9 ICD-9-CM: 427.5  4/17/2021 Unknown SUBJECTIVE: Intubated. Sedated. FiO2 50%. PEEP 6. PH 7.5/28/ 229/ 22/ 100%. CXR: ET tube tip 5 cm above arnel. NG tube tip below the field of view, below diaphragm. Mild cardiomegaly. No significant vascular congestion. Mild atelectasis in the lung bases. OBJECTIVE: 
 
VS:  
Visit Vitals /85 Pulse (!) 56 Temp (!) 95.7 °F (35.4 °C) Resp 18 Ht 6' (1.829 m) Wt 86.2 kg (190 lb) SpO2 100% BMI 25.77 kg/m² Intake/Output Summary (Last 24 hours) at 4/18/2021 1111 Last data filed at 4/18/2021 0700 Gross per 24 hour Intake 317.1 ml Output 2500 ml Net -2182.9 ml  
 
TELE: normal sinus rhythm General: No acute distress HENT: Normocephalic, atraumatic. Neck :  Supple Cardiac:  Normal S1/S2 Chest/Lungs:Clear to anterior Abdomen: Soft Extremities:  No edema Labs: Results:  
   
Chemistry Recent Labs 04/18/21 
0405 04/17/21 
1643 * 143* * 128*  
K 2.7* 3.8 CL 99* 93* CO2 21 16*  
BUN 25* 17  
CREA 1.15 1.59* CA 8.4* 10.3* MG 1.9 2.5 PHOS 3.4 8.1* AGAP 12 19* BUCR 22* 11* AP  --  105 TP  --  6.6 ALB  --  3.1*  
GLOB  --  3.5 AGRAT  --  0.9 CBC w/Diff Recent Labs 04/18/21 
0405 04/17/21 
1643 WBC 11.6 5.0  
RBC 3.40* 3.25* HGB 10.2* 10.0* HCT 28.8* 32.0*  
 223 GRANS 91* 52 LYMPH 3* 34 EOS 0 0 Cardiac Enzymes Recent Labs 04/17/21 
1643 * CKND1 2.3 Coagulation Recent Labs 04/17/21 
1643 PTP 23.5* INR 2.2* APTT 49.8* Lipid Panel No results found for: CHOL, CHOLPOCT, CHOLX, CHLST, CHOLV, 909412, HDL, HDLP, LDL, LDLC, DLDLP, 663185, VLDLC, VLDL, TGLX, TRIGL, TRIGP, TGLPOCT, CHHD, CHHDX  
BNP No results for input(s): BNPP in the last 72 hours. Liver Enzymes Recent Labs 04/17/21 
1643 TP 6.6 ALB 3.1*  Digoxin Thyroid Studies Lab Results Component Value Date/Time TSH 5.66 (H) 04/17/2021 04:43 PM  
    
 
 
 
Moises Bowen MD   Pager # [phone]
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
## 2021-04-18 NOTE — ED NOTES
TRANSFER - OUT REPORT: 
 
Verbal report given to Alina Khan RN (name) on Dalmatia Carroll  being transferred to ICU (unit) for routine progression of care Report consisted of patients Situation, Background, Assessment and  
Recommendations(SBAR). Information from the following report(s) SBAR, Kardex, Procedure Summary, Recent Results and Med Rec Status was reviewed with the receiving nurse. Lines:  
Peripheral IV 04/17/21 Left Antecubital (Active) Site Assessment Clean, dry, & intact 04/17/21 1644 Phlebitis Assessment 0 04/17/21 1644 Infiltration Assessment 0 04/17/21 1644 Dressing Status Clean, dry, & intact 04/17/21 1644 Dressing Type Transparent 04/17/21 5963 Peripheral IV 04/17/21 Right Forearm (Active) Site Assessment Clean, dry, & intact 04/17/21 1645 Phlebitis Assessment 0 04/17/21 1645 Infiltration Assessment 0 04/17/21 1645 Dressing Status Clean, dry, & intact 04/17/21 1645 Dressing Type Transparent 04/17/21 2562 Peripheral IV 04/17/21 Left Hand (Active) Site Assessment Clean, dry, & intact 04/17/21 1647 Phlebitis Assessment 0 04/17/21 1647 Infiltration Assessment 0 04/17/21 1647 Dressing Status Clean, dry, & intact 04/17/21 1647 Dressing Type Transparent 04/17/21 8001 Opportunity for questions and clarification was provided. Patient transported with: 
 Registered Nurse Respiratory tech

## 2021-04-18 NOTE — PROGRESS NOTES
[facility] ICU Progress Note Name: Jordan Iniguez : 1960 MRN: [id number] Date: 2021 1:10 PM 
  
[x]I have reviewed the flowsheet and previous days notes. Events overnight reviewed and discussed with nursing staff. Vital signs and records reviewed. Patient is a 62 y.o. male with PMH of schizophrenia, tobacco abuse and HTN who presented to [facility] ED via EMS s/p PEA Cardiac arrest with ROSC. Apox 24 min downtime per EMS. Cardiology following.  
  
-Lactic acidosis resolved overnight 
-seizures vs myoclonic jerking that resolved with propofol and versed gtt 
-HD stable 
-Remains hypothermic  
-No purposeful movement ROS:Review of systems not obtained due to patient factors. Medication Review · Pressors -NA · Sedation - versed and propofol · Antibiotics - NA 
· Pain - NA 
· GI/ DVT - H2 blocker for GI ppx, starting SQH for DVT ppx · Others (other gtts) Vital Signs:   
Visit Vitals /77 Pulse (!) 55 Temp (!) 95.4 °F (35.2 °C) Resp 18 Ht 6' (1.829 m) Wt 86.2 kg (190 lb) SpO2 100% BMI 25.77 kg/m² O2 Device: Endotracheal tube, Ventilator O2 Flow Rate (L/min): 12 l/min Temp (24hrs), Av.1 °F (35.6 °C), Min:95.4 °F (35.2 °C), Max:98.1 °F (36.7 °C) Intake/Output:  
Last shift:      No intake/output data recorded. Last 3 shifts: 1901 -  0700 In: 317.1 [I.V.:317.1] Out: 2500 [Urine:2500] Intake/Output Summary (Last 24 hours) at 2021 1310 Last data filed at 2021 0700 Gross per 24 hour Intake 317.1 ml Output 2500 ml Net -2182.9 ml  
 
 
Ventilator Settings: 
Mode Rate Tidal Volume Pressure FiO2 PEEP Assist control, Pressure control        40 % 6 cm H20 Peak airway pressure: 23 cm H2O Minute ventilation: 10.4 l/min ARDS network Guidelines:  
Lung protective strategy and Plateau  Pressure goal < 30 cm H2O goals Oxygenation Goals PaO2 55-80 mm Hg or SaO2 88-95% PH goal 7. 30-7.45 VAP bundle: 
Reviewed. [place] tube to suction at 20-30 cm Hg. Maintain [place] tube with 5-10ml air every 4 hours Routine oral care every 4 hours Elevation of head > 45 degree Daily sedation holiday and SBT evaluation starting at 6.00am. 
 
Physical Exam: 
 
General: Intubated/sedated;NAD HEENT:  Anicteric sclerae; pink palpebral conjunctivae; mucosa moist 
Resp:  Symmetrical chest rise, no accessory muscle use; good AE Bilat; no rales/ wheezing/ rhonchi noted CV:  S1, S2 present; RRR;  
GI:  Abdomen soft, non-tender; (+) active bowel sounds Extremities:  +2 pulses on all extremities; no edema/ cyanosis/ clubbing noted Skin:  Warm; no rashes/ lesions noted Neurologic:  Non-focal 
Devices:  ETT, OGT, PIV, Cruz DATA:  
 
Current Facility-Administered Medications Medication Dose Route Frequency  magnesium sulfate 1 g/100 ml IVPB (premix or compounded)  1 g IntraVENous ONCE  
 levETIRAcetam (KEPPRA) 2,500 mg in 0.9% sodium chloride 250 mL IVPB  2,500 mg IntraVENous ONCE  
 levETIRAcetam in saline (iso-os) (KEPPRA) infusion 1,000 mg  1,000 mg IntraVENous Q12H  
 sodium chloride (NS) flush 5-40 mL  5-40 mL IntraVENous Q8H  
 sodium chloride (NS) flush 5-40 mL  5-40 mL IntraVENous PRN  
 acetaminophen (TYLENOL) tablet 650 mg  650 mg Oral Q6H PRN Or  
 acetaminophen (TYLENOL) suppository 650 mg  650 mg Rectal Q6H PRN  polyethylene glycol (MIRALAX) packet 17 g  17 g Oral DAILY PRN  promethazine (PHENERGAN) tablet 12.5 mg  12.5 mg Oral Q6H PRN Or  
 ondansetron (ZOFRAN) injection 4 mg  4 mg IntraVENous Q6H PRN  
 glucose chewable tablet 16 g  4 Tab Oral PRN  
 glucagon (GLUCAGEN) injection 1 mg  1 mg IntraMUSCular PRN  
 dextrose (D50W) injection syrg 12.5-25 g  25-50 mL IntraVENous PRN  chlorhexidine (PERIDEX) 0.12 % mouthwash 10 mL  10 mL Oral Q12H  
 midazolam in normal saline (VERSED) 1 mg/mL infusion  0-10 mg/hr IntraVENous TITRATE  propofol (DIPRIVAN) 10 mg/mL infusion  0-50 mcg/kg/min IntraVENous TITRATE  midazolam (VERSED) injection 1-2 mg  1-2 mg IntraVENous Q30MIN PRN  
 fentaNYL citrate (PF) injection  mcg   mcg IntraVENous Q30MIN PRN  
 famotidine (PF) (PEPCID) 20 mg in 0.9% sodium chloride 10 mL injection  20 mg IntraVENous Q12H Labs: Results:  
   
Chemistry Recent Labs 04/18/21 
0405 04/17/21 
1643 * 143* * 128*  
K 2.7* 3.8 CL 99* 93* CO2 21 16*  
BUN 25* 17  
CREA 1.15 1.59* CA 8.4* 10.3* AGAP 12 19* BUCR 22* 11* AP  --  105 TP  --  6.6 ALB  --  3.1*  
GLOB  --  3.5 AGRAT  --  0.9 CBC w/Diff Recent Labs 04/18/21 
0405 04/17/21 
1643 WBC 11.6 5.0  
RBC 3.40* 3.25* HGB 10.2* 10.0* HCT 28.8* 32.0*  
 223 GRANS 91* 52 LYMPH 3* 34 EOS 0 0 Coagulation Recent Labs 04/18/21 
1029 04/17/21 
1643 PTP 18.3* 23.5* INR 1.6* 2.2* APTT 36.8* 49.8* Liver Enzymes Recent Labs 04/17/21 
1643 TP 6.6 ALB 3.1*  ABG Lab Results Component Value Date/Time PHI 7.50 (H) 04/18/2021 04:52 AM  
 PCO2I 27.8 (L) 04/18/2021 04:52 AM  
 PO2I 229 (H) 04/18/2021 04:52 AM  
 HCO3I 21.9 (L) 04/18/2021 04:52 AM  
 FIO2I 70 04/18/2021 04:52 AM  
  
Microbiology Recent Labs 04/17/21 
1710 04/17/21 
1659 CULT NO GROWTH AFTER 14 HOURS NO GROWTH AFTER 14 HOURS Telemetry: [x]Sinus []A-flutter []Paced []A-fib []Multiple PVCs Imaging: CT Results  (Last 48 hours) 04/17/21 2219  CTA [address] Final result Impression: No findings of pulmonary embolism. Bilateral lower lung atelectasis versus lung consolidation. Fluid-filled esophagus. Enlarged heart. Enlarged pulmonary arteries suggesting some component of pulmonary arterial  
hypertension.   
   
   
  
 Narrative:  EXAM: CT Angiogram of the Chest  
   
CLINICAL INDICATION:  PEA arrest, r/o PE  
   
TECHNIQUE: CT angiogram of the chest performed with IV contrast. MIPS performed All CT scans at this facility are performed using dose optimization technique as  
appropriate to a performed exam, to include automated exposure control,  
adjustment of the mA and/or kV according to patient size (including appropriate  
matching for site specific examination) or use of iterative reconstruction  
technique. IV CONTRAST: 100 cc of Isovue 370 COMPARISON: None FINDINGS:  
   
Thyroid: Unremarkable. Mediastinum: No evidence of adenopathy or fluid collection. Heart: Heart size is enlarged. Pericardium: Mild pericardial effusion. Aorta: No evidence of aortic dissection or aneurysm. Pulmonary Arteries: Enlarged main pulmonary arterial segment measures 3.8 cm,  
suggesting some component of the pulmonary arterial hypertension. No pulmonary  
arterial filling defect identified. Trachea and Bronchi: Unremarkable. Pleura: No evidence of pleural effusion. Lungs: Emphysematous changes. Bilateral lower lung atelectasis versus lung  
consolidation. Patchy groundglass of the right middle lobe. Axilla/Chest wall: Unremarkable. Upper Abdomen: Enteric tube passes into the stomach without visualization the  
tip. Fluid filled esophagus. Musculoskeletal: No acute osseous findings. 04/17/21 2219  CT HEAD WO CONT Final result Impression: No intracranial hemorrhage or mass effect. Further evaluation with brain MRI as clinically warranted. Narrative:  CT HEAD WO CONT: 4/17/2021 10:19 PM  
   
CLINICAL INFORMATION  
unresponsive. COMPARISON None. TECHNIQUE Axial CT images of the brain acquired. Sagittal and coronal reformations  
performed.   
   
The following CT dose reduction techniques were utilized: automated exposure  
control and/or adjustment of the mA and/or kV according to patient size, and the  
use of iterative reconstruction technique FINDINGS INTRA-AXIAL STRUCTURES Cerebral cortex: No evidence of intracranial mass or hemorrhage. Normal  
gray-white differentiation. Scattered slight hypodensities within the  
periventricular and subcortical white matter bilaterally. Ventricular system: Normal in size and morphology for the patient's age. Midline shift: No evidence of midline shift. Sella: The sella turcica and hypothalamic region are within normal limits. Cerebellum: The cerebellum is normal in appearance. Normal gray-white  
differentiation. Brainstem: The brainstem is normal in appearance. The prepontine cistern is  
within normal limits. Normal gray-white differentiation. EXTRA-AXIAL STRUCTURES Normal in size and morphology for the patient's age. VASCULATURE Normal.  
   
EXTRACRANIAL SOFT TISSUE STRUCTURES Orbits: The orbits and globes are within normal limits. Sinuses: Paranasal sinus mucosal thickening/debris. Visualized upper cervical spine: Normal.  
   
OSSEOUS STRUCTURES No evidence of acute fracture. 04/17/21 2219  CT SPINE CERV WO CONT Final result Impression:  No acute osseous findings. Narrative:  CT SPINE CERV WO CONT: 4/17/2021 10:19 PM  
   
CLINICAL INFORMATION  
post rosc, fall. COMPARISON None. TECHNIQUE Axial CT images of the cervical spine acquired. Sagittal and coronal  
reformations performed. . The following CT dose reduction techniques were  
utilized: automated exposure control and/or adjustment of the mA and/or kV  
according to patient size, and the use of iterative reconstruction technique FINDINGS Vertebral body height and alignment: Normal. No evidence of acute fracture. Intervertebral disc height: Disproportionate intervertebral disc space narrowing  
at C4-C5 and C6-C7. Cervical medullary junction: Normal.  
   
Paraspinal tissues: Endotracheal and enteric tubes are seen passing. Brianna Baumann Degenerative changes:  
Disproportionate discogenic degenerative change at C6-C7. Facet ankylosis  
bilaterally at C2-C3. Lung apices: Emphysematous changes are present. CXR Results  (Last 48 hours) 04/18/21 0236  XR CHEST PORT Final result Impression:  As described. Narrative:  Portable CXR:  
   
HISTORY: Intubation. COMPARISON: 4/17/2021 ET tube tip 5 cm above arnel. NG tube tip below the field of view, below  
diaphragm. Mild cardiomegaly. No significant vascular congestion. Mild  
atelectasis in the lung bases. 04/17/21 1736  XR CHEST PORT Final result Impression:     
Improved positioning of endotracheal tube. Narrative:  Portable Frontal Chest.  
   
CLINICAL HISTORY: Cardiac arrest.  
   
TECHNIQUE: Single frontal view of the chest, obtained portably. COMPARISONS: Prior study earlier today. FINDINGS:   
   
  Endotracheal tube has a readjusted, tip now 6 7 m above the arnel. Enteric  
tube in the upper abdomen, not included on the film. Lungs clear. Cardiomediastinal silhouette stable. Vascularity normal. No effusions. 04/17/21 1710  XR CHEST PORT Final result Impression:     
Mild vascular congestion. Endotracheal tube needs be advanced about 6 cm to place the tip in the  
intrathoracic portion of the trachea. Narrative:  Portable Frontal Chest.  
   
CLINICAL HISTORY: Cardiac arrest.  
   
TECHNIQUE: Single frontal view of the chest, obtained portably. COMPARISONS: None. FINDINGS:   
   
There is endotracheal tube with tip 12 cm above the arnel, just above the  
thoracic inlet. There is enteric tube with tip and side port in the stomach. The  
lungs are clear. The cardiomediastinal silhouette is unremarkable. Pulmonary  
vascularity is mildly increased. There are no effusions.   
   
  
  
 
 
IMPRESSION:  
· Acute on chronic Hypoxic Hypercarbic Respiratory Failure - Currently requiring mechanical ventilation. Intubated in the field by EMS 2/2 Cardiac Arrest.  
· PEA/Asystole Cardiac Arrest - aprox 24 min downtime. Unknown cause at this time. Consideration is prolonged QTc from psych medications · Lactic Acidosis - Initial LA 15.68. Likely 2/2 above, resolved · Acute Encephalopathy - 2/2 above. Concern for anoxic brain injury · Myoclonic jerking vs seizure activity, poor prognostic indicator in the setting of cardiac arrest  
· Type 2 NSTEMI 
· Hyponatremia,chronic · Hypokalemia · Elevated TSH · YAIMA, likely ATN w/ BUN/Creatinine ratio · Elevated CE's - Likely 2/2 above. Will trend. Cardiology RECOMMENDATIONS:  
Resp:  
-Titrate FiO2/ supp O2 for SpO2 >90% 
-tolerating AC/PC 
-RR decreased from 24 to 18 in the setting of respiratory alkalosis  
-Not a candidate for ventilator liberation 2' high propofol/versed requirements and encephalopathy I/D:  
-Afebrile; aleukocytosis 
-COVID bio fire negative  
-F/u bcx 
-No abx at this time  
-D/c lactic acid Hem/Onc:  
-Daily CBC; H/H, and plts are stable CVS:  
-HD stable 
-F/u echo  
-Trend troponin Metabolic:  
-K+ replaced this AM, f/u BMP now 
-Na improving w/ acute treatment Renal:  
-Trend Renal indices; Diuresis, Cruz to BSD, Endocrine:  
-POC Glucose q6 
-Check Free T4 
GI:  
-SUP 
-Check KUB 
-IF OGT placement correct can start enteral feeds Musc/Skin:  
-No acute issues, wound care Neuro: 
-Continue propofol and versed for myoclonic jerks/Seizure/? 
-CT head nil acute 
-Cannot clear C-spine, brace in place 
-Neurology consultation -EEG 
-Starting Keppra 1000 q12h per neurology recs Code status 
-Full code Best Practices/ Safety Bundles: 
· CAUTI Bundle · Electrolyte Replacement Bundle · Glycemic control goal <180; avoid Hypoglycemia · IHI ICU Bundles: ·  Cruz Bundle Followed and Vent Bundle Followed, Vent Day 2   
· [facility] Vent patients: · VAP bundle, Aim to keep peak plateau pressure 80-88PR H2O 
· Aspiration Precautions - HOB >30' · Daily sedation holiday as indicated · SBT as tolerated/appropriate · Titrate FiO2 for SpO2 >94% · Aggressive Pulmonary Hygeiene · Stress ulcer prophylaxis: H2 blocker · DVT prophylaxis: SQH 
· Need for Lines, friedman assessed. The patient is: [x] acutely ill Risk of deterioration: [] moderate  
 [] critically ill  [] high  
 
[x]See my orders for details My assessment/plan was discussed with: 
[x]Nursing []PT/OT [x]Respiratory therapy [x] []Family [] KENA Bullard

## 2021-04-19 PROBLEM — J12.82 PNEUMONIA DUE TO COVID-19 VIRUS: Status: RESOLVED | Noted: 2020-01-01 | Resolved: 2021-01-01

## 2021-04-19 PROBLEM — U07.1 PNEUMONIA DUE TO COVID-19 VIRUS: Status: RESOLVED | Noted: 2020-01-01 | Resolved: 2021-01-01

## 2021-04-19 NOTE — PROGRESS NOTES
Death Pronouncement Note Patient lying in bed, mouth closed, eyes closed. Pupils fixed and equal bilaterally. No response to verbal or painful stimuli. No heart or lung sounds auscultated. No carotid or peripheral pulses. Death officially pronounced at 18:32, 4/19/2021 Jag Dwyer PA-C 
04/19/21 Pulmonary, Critical Care Medicine [facility]

## 2021-04-19 NOTE — PROGRESS NOTES
Problem: Non-Violent Restraints Goal: Removal from restraints as soon as assessed to be safe Outcome: Progressing Towards Goal 
  
Problem: Ventilator Management Goal: *Adequate oxygenation and ventilation Outcome: Progressing Towards Goal 
  
Problem: Falls - Risk of 
Goal: *Absence of Falls Description: Document Kei Valdovinos Fall Risk and appropriate interventions in the flowsheet. Outcome: Progressing Towards Goal 
Note: Fall Risk Interventions: 
  
 
Mentation Interventions: Adequate sleep, hydration, pain control, Bed/chair exit alarm, Door open when patient unattended, Evaluate medications/consider consulting pharmacy Medication Interventions: Assess postural VS orthostatic hypotension, Bed/chair exit alarm, Evaluate medications/consider consulting pharmacy Elimination Interventions: Bed/chair exit alarm, Call light in reach, Toileting schedule/hourly rounds, Patient to call for help with toileting needs History of Falls Interventions: Bed/chair exit alarm, Door open when patient unattended, Evaluate medications/consider consulting pharmacy

## 2021-04-19 NOTE — PROGRESS NOTES
1300: Family decided to proceed with compassionate extubation. Waiting for [facility] to assess and talk with family. 1730: Family would like to compassionately extubate, did not want to speak further with [facility]. Gave patient prn medication per orders for comfort. Extubated at [phone]. 1832: Asystole. No heart or lung sounds auscultated. No carotid or peripheral pulses. Talked with mother, Sherri Chandra and notified her of time of death and that the patient's ring on his left pinky couldn't be removed due to swollen joint. She stated \"oh yes, that was swollen like that last week when he went to his doctor, and his doctor said he'd have to cut it off but he refused to have it cut off\". Sent home patient's other belongings with patient this morning. Will leave chain bracelet up front with unit secretary for Ms Emile Nevarez to come in and retrieve.

## 2021-04-19 NOTE — PROGRESS NOTES
2000 Assumed care of pt after bedside report with pt lying in bed with HOB elevated. Monitor denotes SB-NSR. Pt not responsive. Dies not open eyes, no gag or cough at present and dies not respond to any stimuli. VSS. Pt intubated and on vent at prescribed settings. Abd intact and OGT in place at 60 cm and clamped. Cruz intact and draining liyah urine. Pt receiving K+ replacements. 2200 Pt status unchanged . 04/19/2021  0000 Accucheck result 125. 0230 AM labs drawn and sent to lab. 0400 Pt's sedation of Versed drip and Propofol drip being titrated to off as ordered and aas pt tolerates. 0530 Both drips titrated to off. Pt still not responsive. 0600 Accucheck result 116.

## 2021-04-19 NOTE — PROGRESS NOTES
Problem: Ventilator Management Goal: *Adequate oxygenation and ventilation Outcome: Progressing Towards Goal 
Goal: *Patient maintains clear airway/free of aspiration Outcome: Progressing Towards Goal 
Goal: *Absence of infection signs and symptoms Outcome: Progressing Towards Goal 
Goal: *Normal spontaneous ventilation Outcome: Progressing Towards Goal 
  
Problem: Patient Education: Go to Patient Education Activity Goal: Patient/Family Education Outcome: Progressing Towards Goal 
  
Problem: Non-Violent Restraints Goal: Removal from restraints as soon as assessed to be safe Outcome: Progressing Towards Goal 
Goal: No harm/injury to patient while restraints in use Outcome: Progressing Towards Goal 
Goal: Patient's dignity will be maintained Outcome: Progressing Towards Goal 
Goal: Patient Interventions Outcome: Progressing Towards Goal 
  
Problem: Pain Goal: *Control of Pain Outcome: Progressing Towards Goal 
Goal: *PALLIATIVE CARE:  Alleviation of Pain Outcome: Progressing Towards Goal 
  
Problem: Patient Education: Go to Patient Education Activity Goal: Patient/Family Education Outcome: Progressing Towards Goal 
  
Problem: Injury - Risk of, Adverse Drug Event Goal: *Absence of adverse drug events Outcome: Progressing Towards Goal 
Goal: *Absence of medication errors Outcome: Progressing Towards Goal 
Goal: *Knowledge of prescribed medications Outcome: Progressing Towards Goal 
  
Problem: Patient Education: Go to Patient Education Activity Goal: Patient/Family Education Outcome: Progressing Towards Goal 
  
Problem: Infection - Risk of, Urinary Catheter-Associated Urinary Tract Infection Goal: *Absence of infection signs and symptoms Outcome: Progressing Towards Goal 
  
Problem: Patient Education: Go to Patient Education Activity Goal: Patient/Family Education Outcome: Progressing Towards Goal 
  
Problem: Falls - Risk of 
Goal: *Absence of Falls Description: Document Joel Cea Risk and appropriate interventions in the flowsheet. Outcome: Progressing Towards Goal 
Note: Fall Risk Interventions: 
  
 
Mentation Interventions: Adequate sleep, hydration, pain control, Evaluate medications/consider consulting pharmacy Medication Interventions: Bed/chair exit alarm, Evaluate medications/consider consulting pharmacy Elimination Interventions: Bed/chair exit alarm, Toileting schedule/hourly rounds History of Falls Interventions: Bed/chair exit alarm, Evaluate medications/consider consulting pharmacy Problem: Patient Education: Go to Patient Education Activity Goal: Patient/Family Education Outcome: Progressing Towards Goal 
  
Problem: Pressure Injury - Risk of 
Goal: *Prevention of pressure injury Description: Document Dru Scale and appropriate interventions in the flowsheet. Outcome: Progressing Towards Goal 
Note: Pressure Injury Interventions: 
Sensory Interventions: Assess changes in LOC, Check visual cues for pain Moisture Interventions: Absorbent underpads Activity Interventions: Pressure redistribution bed/mattress(bed type) Mobility Interventions: HOB 30 degrees or less, Pressure redistribution bed/mattress (bed type) Nutrition Interventions: Discuss nutritional consult with provider Friction and Shear Interventions: Apply protective barrier, creams and emollients, Foam dressings/transparent film/skin sealants, HOB 30 degrees or less Problem: Patient Education: Go to Patient Education Activity Goal: Patient/Family Education Outcome: Progressing Towards Goal

## 2021-04-19 NOTE — PROGRESS NOTES
[facility]  -  Progress Note Patient: Viry Alcocer MRN: [id number]  SSN: [id number] YOB: 1960  Age: 62 y.o. Sex: male Admit Date: 4/17/2021 Assessment:  
 
Hospital Problems  Never Reviewed Codes Class Noted POA Cardiac arrest [facility]) ICD-10-CM: I46.9 ICD-9-CM: 427.5  4/17/2021 Unknown  
   
  
 
-Cardiac Arrest PEA on scene Troponin I 2.11 > 0.14 pro BNP 5823  
-Respiratory Failure s/p Mechanical Ventilation 
-Cardiomyopathy LVEF 45% by bedside echocardiogram 4/17/21 
-Heart Failure with reduce LV Function 
-New nonischemic cardiomyopathy with EF 25% by echo 10/28/20.  
-S/p Cardiac cath 10/29/20 with patent coronaries. 
-Echo 10/28/20, cannot exclude mitral valve mass although given clinical scenario, low suspicion for endocarditis, follow-up blood culttures, ID following. Plan was for WADE for completeness but patient has refused any further testing at this time. 
-Presented due to dyspnea, atypical chest pain.  Both improved.   
-Bilateral infiltrates by chest CTA 10/26/2020, no PE  
-Hx HTN 
-Hx tobacco abuse, reports plan to quit once discharged -Schizophrenia - Hypokalemia 
 
-No primary cardiologist 
 
Plan:  
 
-His CV status remains about the same with no major changes  
-Noted that family will come today to discuss patient and goals of care. -Patient does not require any artificial pacing at this time, and will determine if an ICD is appropriate pending overall clinical course and family decision making on goals of care. -Continue with all other supportive measures. Staff addendum:   
Patient now planning comfort measures per staff and family. Please call if questions. I saw, examined, and evaluated the patient. I personally reviewed the patient's labs, tests, vitals, orders, medications, updated history, and other providers assessments. I personally agree with the findings as stated and the plan as documented.  
Tho Sahu, MD 
 
 
Subjective:  
 
Remains intubated, sedated. Per nursing- the patient has had attempts to come out of sedation, but no meaningful neurological activity was observed Objective:  
  
Patient Vitals for the past 8 hrs: 
 Temp Pulse Resp BP SpO2  
04/19/21 0819  80 23  100 % 04/19/21 0700 (!) 96.1 °F (35.6 °C) 70 22 (!) 133/90 100 % 04/19/21 0630 (!) 96.1 °F (35.6 °C) 69 19 125/85 100 % 04/19/21 0600 (!) 95.9 °F (35.5 °C) 70 18 123/85 100 % 04/19/21 0530 (!) 96.1 °F (35.6 °C) 69 18 124/86 100 % 04/19/21 0500 (!) 96.1 °F (35.6 °C) 69 19 123/84 100 % 04/19/21 0430 (!) 96.3 °F (35.7 °C) 69 19 118/81 100 % 04/19/21 0414  71 20  100 % 04/19/21 0400 (!) 96.3 °F (35.7 °C) 69 19 120/82 99 % 04/19/21 0330 (!) 96.4 °F (35.8 °C) 69 19 118/83 100 % 04/19/21 0300 (!) 96.4 °F (35.8 °C) 69 19 120/81 100 % 04/19/21 0230 (!) 96.4 °F (35.8 °C) 69 20 116/81 100 % 04/19/21 0200 (!) 96.6 °F (35.9 °C) 69 20 116/81 100 % 04/19/21 0130 (!) 96.4 °F (35.8 °C) 69 19 117/80 100 % Patient Vitals for the past 96 hrs: 
 Weight 04/19/21 0856 86.2 kg (190 lb 0.6 oz) 04/18/21 0748 86.2 kg (190 lb) 04/17/21 1656 86.2 kg (190 lb) Intake/Output Summary (Last 24 hours) at 4/19/2021 0900 Last data filed at 4/19/2021 0600 Gross per 24 hour Intake 921.6 ml Output 370 ml Net 551.6 ml Physical Exam: 
General:  appears stated age, intubated, sedated, per nursing when attempts to come out of sedation there is no meaningful neurologic activity noted. Neck:  no JVD Lungs:  clear to auscultation bilaterally Heart:  regular rate and rhythm, S1, S2 normal, no murmur, click, rub or gallop Extremities:  extremities normal, atraumatic, no cyanosis or edema Data Review:  
 
Labs: Results:  
   
Chemistry Recent Labs 04/19/21 
0239 04/18/21 
1849 04/18/21 
1709 04/18/21 
0405 04/17/21 
1643 * 106* 109* 108* 143* * 132* 131* 132* 128* K 3.4* 3.3* 3.1* 2.7* 3.8  100 100 99* 93* CO2 19* 20* 21 21 16*  
BUN 28* 25* 25* 25* 17  
CREA 1.54* 1.26 1.21 1.15 1.59* CA 8.5 8.4* 8.6 8.4* 10.3* MG 2.5  --  2.5 1.9 2.5 PHOS 6.8*  --  5.6* 3.4 8.1* AGAP 13 12 10 12 19* BUCR 18 20 21* 22* 11* AP  --   --   --   --  105 TP  --   --   --   --  6.6 ALB  --   --   --   --  3.1*  
GLOB  --   --   --   --  3.5 AGRAT  --   --   --   --  0.9 CBC w/Diff Recent Labs 04/19/21 
0239 04/18/21 
0405 04/17/21 
1643 WBC 21.1* 11.6 5.0  
RBC 3.78* 3.40* 3.25* HGB 11.4* 10.2* 10.0* HCT 32.9* 28.8* 32.0*  
 238 223 GRANS 96* 91* 52 LYMPH 3* 3* 34 EOS 0 0 0 Cardiac Enzymes Lab Results Component Value Date/Time  (H) 04/18/2021 05:09 PM  
 CKMB 12.2 (H) 04/18/2021 05:09 PM  
 CKND1 2.4 04/18/2021 05:09 PM  
 TROIQ 0.60 (H) 04/18/2021 05:09 PM  
 TROIQ 0.86 (H) 04/18/2021 10:29 AM  
  
Coagulation Recent Labs 04/18/21 
1709 04/18/21 
1029 04/17/21 
1643 PTP 18.4* 18.3* 23.5* INR 1.6* 1.6* 2.2* APTT  --  36.8* 49.8* Lipid Panel No results found for: CHOL, CHOLPOCT, CHOLX, CHLST, CHOLV, 555249, HDL, HDLP, LDL, LDLC, DLDLP, 826563, VLDLC, VLDL, TGLX, TRIGL, TRIGP, TGLPOCT, CHHD, CHHDX  
BNP No results found for: BNP, BNPP, XBNPT Liver Enzymes Recent Labs 04/17/21 
1643 TP 6.6 ALB 3.1*  Digoxin Thyroid Studies Lab Results Component Value Date/Time  TSH 5.66 (H) 04/17/2021 04:43 PM

## 2021-04-19 NOTE — PROGRESS NOTES
04/19/21 1752 Ventilator Initiate/Discontinue Ventilator Discontinue Yes  
 
9343 Patient compassionately extubated.

## 2021-04-19 NOTE — PROGRESS NOTES
Nutrition Assessment Type and Reason for Visit: Initial, Positive nutrition screen Nutrition Recommendations/Plan: - Initiate tube feeding of Jevity 1.5 at 10 mL/hr and advance as tolerated by 10 mL q 4 hours to goal rate of 60 mL/hr with prosource BID and 50 mL q 4 hour water flushes (goal regimen to provide 2280 kcal, 122 gm protein, 1094 mL free water, 100% RDIs). Nutrition Assessment:  Intubated, off sedation with plan with EEG today. Replaced with 20 mEq K. Malnutrition Assessment: 
Malnutrition Status: No malnutrition Estimated Daily Nutrient Needs: 
Energy (kcal):  9377-9693 Protein (g):  103-172 Fluid (ml/day):  9227-4792 Nutrition Related Findings:  Last BM PTA. Current Nutrition Therapies: DIET NPO 
DIET TUBE FEEDING 
DIET NUTRITIONAL SUPPLEMENTS Breakfast, Lunch; Prosource Anthropometric Measures: 
· Height:  6' (182.9 cm) · Current Body Wt:  86.2 kg (190 lb 0.6 oz) · BMI: 25.8 Nutrition Diagnosis:  
· Inadequate oral intake related to cognitive or neurological impairment, impaired respiratory function as evidenced by NPO or clear liquid status due to medical condition, intubation Nutrition Intervention: 
Food and/or Nutrient Delivery: Continue NPO, Start tube feeding Nutrition Education and Counseling: Education not indicated Coordination of Nutrition Care: Continue to monitor while inpatient, Interdisciplinary rounds Goals: 
Nutritional needs will be met through adequate oral intake or nutrition support within the next 7 days Nutrition Monitoring and Evaluation:  
Behavioral-Environmental Outcomes: None identified Food/Nutrient Intake Outcomes: Enteral nutrition intake/tolerance Physical Signs/Symptoms Outcomes: Biochemical data, Nutrition focused physical findings, GI status Discharge Planning: Too soon to determine Electronically signed by Sophie Madrigal RD, [address]  on 4/19/2021 at 1:15 PM 
 
Contact Number: [phone]

## 2021-04-19 NOTE — PROGRESS NOTES
Problem: Ventilator Management Goal: *Adequate oxygenation and ventilation Outcome: Progressing Towards Goal 
Goal: *Patient maintains clear airway/free of aspiration Outcome: Progressing Towards Goal 
Goal: *Absence of infection signs and symptoms Outcome: Progressing Towards Goal 
Goal: *Normal spontaneous ventilation Outcome: Progressing Towards Goal 
  
Problem: Patient Education: Go to Patient Education Activity Goal: Patient/Family Education Outcome: Progressing Towards Goal 
  
Problem: Non-Violent Restraints Goal: Removal from restraints as soon as assessed to be safe Outcome: Progressing Towards Goal 
Goal: No harm/injury to patient while restraints in use Outcome: Progressing Towards Goal 
Goal: Patient's dignity will be maintained Outcome: Progressing Towards Goal 
Goal: Patient Interventions Outcome: Progressing Towards Goal 
  
Problem: Pain Goal: *Control of Pain Outcome: Progressing Towards Goal 
Goal: *PALLIATIVE CARE:  Alleviation of Pain Outcome: Progressing Towards Goal 
  
Problem: Patient Education: Go to Patient Education Activity Goal: Patient/Family Education Outcome: Progressing Towards Goal 
  
Problem: Injury - Risk of, Adverse Drug Event Goal: *Absence of adverse drug events Outcome: Progressing Towards Goal 
Goal: *Absence of medication errors Outcome: Progressing Towards Goal 
Goal: *Knowledge of prescribed medications Outcome: Progressing Towards Goal 
  
Problem: Patient Education: Go to Patient Education Activity Goal: Patient/Family Education Outcome: Progressing Towards Goal 
  
Problem: Infection - Risk of, Urinary Catheter-Associated Urinary Tract Infection Goal: *Absence of infection signs and symptoms Outcome: Progressing Towards Goal 
  
Problem: Patient Education: Go to Patient Education Activity Goal: Patient/Family Education Outcome: Progressing Towards Goal 
  
Problem: Falls - Risk of 
Goal: *Absence of Falls Description: Document Adrián Rick Risk and appropriate interventions in the flowsheet. Outcome: Progressing Towards Goal 
Note: Fall Risk Interventions: 
  
 
Mentation Interventions: Adequate sleep, hydration, pain control Medication Interventions: Evaluate medications/consider consulting pharmacy Elimination Interventions: Toileting schedule/hourly rounds History of Falls Interventions: Evaluate medications/consider consulting pharmacy Problem: Patient Education: Go to Patient Education Activity Goal: Patient/Family Education Outcome: Progressing Towards Goal 
  
Problem: Pressure Injury - Risk of 
Goal: *Prevention of pressure injury Description: Document Dru Scale and appropriate interventions in the flowsheet. Outcome: Progressing Towards Goal 
Note: Pressure Injury Interventions: 
Sensory Interventions: Keep linens dry and wrinkle-free, Avoid rigorous massage over bony prominences, Minimize linen layers Moisture Interventions: Absorbent underpads, Minimize layers, Check for incontinence Q2 hours and as needed Activity Interventions: Pressure redistribution bed/mattress(bed type) Mobility Interventions: Pressure redistribution bed/mattress (bed type) Nutrition Interventions: Document food/fluid/supplement intake Friction and Shear Interventions: Minimize layers Problem: Patient Education: Go to Patient Education Activity Goal: Patient/Family Education Outcome: Progressing Towards Goal

## 2021-04-19 NOTE — PROGRESS NOTES
Attempted to call pt's mother to complete initial assessment. Left message for pt's mother, Ms. Melissa Hughes. Natalie Toro, MSW, [place]- [phone]

## 2021-04-19 NOTE — DISCHARGE SUMMARY
Death Summary Patient: Lia Weldon               Sex: male          DOA: 2021 YOB: 1960      Age:  62 y.o.        LOS:  LOS: 2 days Admit Date: 2021 Discharge Date: 2021 Admission Diagnoses: Cardiac arrest ([facility] [I46.9] Discharge Diagnoses:   
Problem List as of 2021 Never Reviewed Codes Class Noted - Resolved Cardiac arrest [facility]) ICD-10-CM: I46.9 ICD-9-CM: 427.5  2021 - Present Hypertension ICD-10-CM: I10 
ICD-9-CM: 401.9  10/26/2020 - Present Gastroesophageal reflux ICD-10-CM: K21.9 ICD-9-CM: 530.81  10/26/2020 - Present Anemia ICD-10-CM: D64.9 ICD-9-CM: 285.9  10/26/2020 - Present Hyponatremia ICD-10-CM: E87.1 ICD-9-CM: 276.1  10/26/2020 - Present Nicotine dependence ICD-10-CM: P04.228 ICD-9-CM: 305.1  10/26/2020 - Present Schizophrenia ([facility] ICD-10-CM: F20.9 ICD-9-CM: 295.90  10/26/2020 - Present Shortness of breath ICD-10-CM: R06.02 
ICD-9-CM: 786.05  10/26/2020 - Present Respiratory failure ([facility] ICD-10-CM: J96.90 ICD-9-CM: 518.81  10/26/2020 - Present RESOLVED: Pneumonia due to COVID-19 virus ICD-10-CM: U07.1, J12.82 ICD-9-CM: 480.8, 079.89  10/26/2020 - 2021 Discharge Condition:   Hospital Course: Course:61 y/o male with history of schizophrenia, tobacco use, and HTN who presented to [facility] ED via EMS s/p PEA arrest outside his home. Patient's mother found him down on the porch near his bike with agonal breathing. When EMS arrived, he was in PEA, and so CPR was initiated. He was down for approximately 24 mins before achieving ROSC. On my evaluation, the patient was having episodes of seizure vs more likely myoclonic jerking. Bedside echo showed reduced moderately reduced LVEF, no RV dilation or evidence of RV strain. CT head, C-spine, CTA were unremarkable. EKG showed prolonged Qtc.  He was started on versed and fentanyl for sedation/pain management, and temperature targeted management (was already hypothermic at 95F, so did not require active cooling). Remained hemodynamically stable off of pressors. Blood cultures were drawn, but antibiotics were not initiated given low suspicion for infection. Overall, the underlying etiology of his PEA arrest is unclear. Will obtain formal echo tomorrow, as well as neurology evaluation. Subjective 04/19/21 
- Pt off sedation now 
- HD stable - Remains mildly hypothermic - luis ac initiated this AM 
- No purposeful movements noted. 
- (+)cough/gag, (+)corneal, breathing over the vent. - Family at bedside today. Case discussed at length with patient's mother and brother. I explained that pt would likely not have any meaningful recovery. I also explained DNR and comfort care measures to them both. Mother stated José Santos had been talking about death recently and I don't want him to suffer anymore\". Family would like to proceed with comfort care measures after her daughter is at bedside as well. Life net was called per RN protocol, awaiting their arrival prior to extubation. Pt compassionately extubated on 4/19/21 at 1752. Time of Death 18:32. Family was notified by RN. Significant Diagnostic Studies:  
CT Results  (Last 48 hours) 04/17/21 2219  CTA [address] Final result Impression: No findings of pulmonary embolism. Bilateral lower lung atelectasis versus lung consolidation. Fluid-filled esophagus. Enlarged heart. Enlarged pulmonary arteries suggesting some component of pulmonary arterial  
hypertension. Narrative:  EXAM: CT Angiogram of the Chest  
   
CLINICAL INDICATION:  PEA arrest, r/o PE  
   
TECHNIQUE: CT angiogram of the chest performed with IV contrast. MIPS performed All CT scans at this facility are performed using dose optimization technique as  
appropriate to a performed exam, to include automated exposure control,  
adjustment of the mA and/or kV according to patient size (including appropriate  
matching for site specific examination) or use of iterative reconstruction  
technique. IV CONTRAST: 100 cc of Isovue 370 COMPARISON: None FINDINGS:  
   
Thyroid: Unremarkable. Mediastinum: No evidence of adenopathy or fluid collection. Heart: Heart size is enlarged. Pericardium: Mild pericardial effusion. Aorta: No evidence of aortic dissection or aneurysm. Pulmonary Arteries: Enlarged main pulmonary arterial segment measures 3.8 cm,  
suggesting some component of the pulmonary arterial hypertension. No pulmonary  
arterial filling defect identified. Trachea and Bronchi: Unremarkable. Pleura: No evidence of pleural effusion. Lungs: Emphysematous changes. Bilateral lower lung atelectasis versus lung  
consolidation. Patchy groundglass of the right middle lobe. Axilla/Chest wall: Unremarkable. Upper Abdomen: Enteric tube passes into the stomach without visualization the  
tip. Fluid filled esophagus. Musculoskeletal: No acute osseous findings. 04/17/21 2219  CT HEAD WO CONT Final result Impression: No intracranial hemorrhage or mass effect. Further evaluation with brain MRI as clinically warranted. Narrative:  CT HEAD WO CONT: 4/17/2021 10:19 PM  
   
CLINICAL INFORMATION  
unresponsive. COMPARISON None. TECHNIQUE Axial CT images of the brain acquired. Sagittal and coronal reformations  
performed. The following CT dose reduction techniques were utilized: automated exposure  
control and/or adjustment of the mA and/or kV according to patient size, and the  
use of iterative reconstruction technique FINDINGS INTRA-AXIAL STRUCTURES Cerebral cortex: No evidence of intracranial mass or hemorrhage. Normal  
gray-white differentiation.  Scattered slight hypodensities within the  
periventricular and subcortical white matter bilaterally. Ventricular system: Normal in size and morphology for the patient's age. Midline shift: No evidence of midline shift. Sella: The sella turcica and hypothalamic region are within normal limits. Cerebellum: The cerebellum is normal in appearance. Normal gray-white  
differentiation. Brainstem: The brainstem is normal in appearance. The prepontine cistern is  
within normal limits. Normal gray-white differentiation. EXTRA-AXIAL STRUCTURES Normal in size and morphology for the patient's age. VASCULATURE Normal.  
   
EXTRACRANIAL SOFT TISSUE STRUCTURES Orbits: The orbits and globes are within normal limits. Sinuses: Paranasal sinus mucosal thickening/debris. Visualized upper cervical spine: Normal.  
   
OSSEOUS STRUCTURES No evidence of acute fracture. 04/17/21 2219  CT SPINE CERV WO CONT Final result Impression:  No acute osseous findings. Narrative:  CT SPINE CERV WO CONT: 4/17/2021 10:19 PM  
   
CLINICAL INFORMATION  
post rosc, fall. COMPARISON None. TECHNIQUE Axial CT images of the cervical spine acquired. Sagittal and coronal  
reformations performed. . The following CT dose reduction techniques were  
utilized: automated exposure control and/or adjustment of the mA and/or kV  
according to patient size, and the use of iterative reconstruction technique FINDINGS Vertebral body height and alignment: Normal. No evidence of acute fracture. Intervertebral disc height: Disproportionate intervertebral disc space narrowing  
at C4-C5 and C6-C7. Cervical medullary junction: Normal.  
   
Paraspinal tissues: Endotracheal and enteric tubes are seen passing. Annia Dues Degenerative changes:  
Disproportionate discogenic degenerative change at C6-C7. Facet ankylosis  
bilaterally at C2-C3. Lung apices: Emphysematous changes are present. Consults:  
 Treatment Team: Attending Provider: Milla Palma MD; Consulting Provider: Milla Palma MD; Consulting Provider: Evette Vicente MD; Consulting Provider: Francesca Palomares MD; Utilization Review: Aracely Lord RN; Primary Nurse: Demetrius Guillaume; Care Manager: Jb Zepeda Nurse: Macho Ferrer Time of Death: 18:32 Patricia Kang PA-C 
4/19/2021 
6:34 PM

## 2021-04-19 NOTE — PROGRESS NOTES
attended the interdisciplinary rounds for [facility], who is a 62 y.o.,male. Patients Primary Language is: [place]. According to the patients EMR Sikh Affiliation is: [facility].  
 
The reason the Patient came to the hospital is:  
Patient Active Problem List  
 Diagnosis Date Noted  Cardiac arrest ([facility] 04/17/2021  Pneumonia due to COVID-19 virus 10/26/2020  Hypertension 10/26/2020  Gastroesophageal reflux 10/26/2020  Anemia 10/26/2020  Hyponatremia 10/26/2020  
 Nicotine dependence 10/26/2020  Schizophrenia ([facility] 10/26/2020  Shortness of breath 10/26/2020  Respiratory failure ([facility] 10/26/2020 Plan: 
Chaplains will continue to follow and will provide pastoral care on an as needed/requested basis.  recommends bedside caregivers page  on duty if patient shows signs of acute spiritual or emotional distress. [address] [facility] [employer] [facility]  
[phone]

## 2021-04-19 NOTE — PROGRESS NOTES
visited with the family of Kandy Parker, who is a 62 y.o.,male. The  provided the following Interventions: 
I responded to a request by patient's family member. Upon arrival patient's mother was actively grieving & sobbing. She had decided to transistion her son to comfort care. Expressed pastoral care and compassion. I listened as Ms. Luana Herrera talked about her son and his life. We prayed together for her, the patient as he transitions and other family members. When she felt ready, I escorted her along with her other son to the main exit. Plan: 
Chaplains will continue to follow and will provide pastoral care on an as needed/requested basis.  recommends bedside caregivers page  on duty if patient shows signs of acute spiritual or emotional distress. [facility]  
[phone]

## 2021-04-19 NOTE — PROGRESS NOTES
[facility]. Pulmonary, Critical Care, and Sleep Medicine Name: Lia Weldon MRN: [id number] : 1960 Hospital: [facility] Date: 2021  Admission Date: 2021 Chart and notes reviewed. Data reviewed. I have evaluated all findings. [x]I have reviewed the flowsheet and previous days notes. [x]The patient is unable to give any meaningful history or review of systems because the patient is: 
[x]Intubated []Sedated  
[]Unresponsive [x]The patient is critically ill on     
[x]Mechanical ventilation []Pressors []BiPAP [] Interval HPI:Patient is a 60 y. o. male with PMH of schizophrenia, tobacco abuse and HTN who presented to [facility] ED via EMS s/p PEA Cardiac arrest with ROSC. Apox 24 min downtime per EMS. Cardiology following. Subjective 21 
- Pt off sedation now 
- HD stable - Remains mildly hypothermic - luis ac initiated this AM 
- No purposeful movements noted. 
- (+)cough/gag, (+)corneal, breathing over the vent. - Family at bedside today. Case discussed at length with patient's mother and brother. I explained that pt would likely not have any meaningful recovery. I also explained DNR and comfort care measures to them both. Mother stated Lilliam Mattson had been talking about death recently and I don't want him to suffer anymore\". Family would like to proceed with comfort care measures after her daughter is at bedside as well. Life net was called per RN protocol, awaiting their arrival prior to extubation. ROS:Review of systems not obtained due to patient factors. Events and notes from last 24 hours reviewed. Care plan discussed on multidisciplinary rounds. Patient Active Problem List  
Diagnosis Code  Pneumonia due to COVID-19 virus U07.1, J12.82  
 Hypertension I10  
 Gastroesophageal reflux K21.9  Anemia D64.9  Hyponatremia E87.1  Nicotine dependence F17.200  Schizophrenia ([facility] F20.9  Shortness of breath R06.02  
 Respiratory failure ([facility] J96.90  [facility] ([facility] I46.9 Vital Signs: 
Visit Vitals BP (!) 162/96 Pulse 94 Temp 97.7 °F (36.5 °C) Resp (!) 39 Ht 6' (1.829 m) Wt 86.2 kg (190 lb 0.6 oz) SpO2 100% BMI 25.77 kg/m² O2 Device: Endotracheal tube, Ventilator O2 Flow Rate (L/min): 12 l/min Temp (24hrs), Av.1 °F (35.6 °C), Min:95.2 °F (35.1 °C), Max:97.7 °F (36.5 °C) Intake/Output:  
Last shift:       07 - 1900 In: -  
Out: 275 [Urine:275] Last 3 shifts:  190 -  0700 In: 1269.3 [I.V.:1269.3] Out: 9062 [Urine:2870] Intake/Output Summary (Last 24 hours) at 2021 1307 Last data filed at 2021 1200 Gross per 24 hour Intake 610.4 ml Output 645 ml Net -34.6 ml  
  
 
Ventilator Settings: 
Ventilator Mode: Assist control, Pressure control Respiratory Rate Back-Up Rate: 18 Insp Time (sec): 0.9 sec I:E Ratio: 1:2.7 Ventilator Volumes Vt Exhaled (Machine Breath) (ml): 541 ml Vt Spont (ml): 397 ml Ve Observed (l/min): 9.4 l/min Ventilator Pressures PC Set: 16 PIP Observed (cm H2O): 25 cm H2O Plateau Pressure (cm H2O): 16 cm H2O 
MAP (cm H2O): 15 PEEP/VENT (cm H2O): 5 cm H20 Current Facility-Administered Medications Medication Dose Route Frequency  fentaNYL citrate (PF) 50 mcg/mL injection  levETIRAcetam in saline (iso-os) (KEPPRA) infusion 1,000 mg  1,000 mg IntraVENous Q12H  
 heparin (porcine) injection 5,000 Units  5,000 Units SubCUTAneous Q8H  
 sodium chloride (NS) flush 5-40 mL  5-40 mL IntraVENous Q8H  
 chlorhexidine (PERIDEX) 0.12 % mouthwash 10 mL  10 mL Oral Q12H  
 midazolam in normal saline (VERSED) 1 mg/mL infusion  0-10 mg/hr IntraVENous TITRATE  propofol (DIPRIVAN) 10 mg/mL infusion  0-50 mcg/kg/min IntraVENous TITRATE  famotidine (PF) (PEPCID) 20 mg in 0.9% sodium chloride 10 mL injection  20 mg IntraVENous Q12H Telemetry: [x]Sinus []A-flutter []Paced []A-fib []Multiple PVCs Physical Exam:  
  
General: Intubated. NAD 
HEENT:  Anicteric sclerae; pink palpebral conjunctivae; mucosa moist 
Resp:  Symmetrical chest rise, no accessory muscle use; good AE Bilat; no rales/ wheezing/ rhonchi noted CV:  S1, S2 present; RRR;  
GI:  Abdomen soft, non-tender; (+) active bowel sounds Extremities:  +2 pulses on all extremities; no edema/ cyanosis/ clubbing noted Skin:  Warm; no rashes/ lesions noted Neurologic:  Non-focal 
Devices:  ETT, OGT, PIV, Cruz DATA: 
MAR reviewed and pertinent medications noted or modified as needed Labs: 
Recent Labs 04/19/21 
0239 04/18/21 
0405 04/17/21 
1643 WBC 21.1* 11.6 5.0 HGB 11.4* 10.2* 10.0* HCT 32.9* 28.8* 32.0*  
 238 223 Recent Labs 04/19/21 
0239 04/18/21 
1849 04/18/21 
1709 04/18/21 
1029 04/18/21 
0405 04/17/21 
1643 * 132* 131*  --  132* 128* K 3.4* 3.3* 3.1*  --  2.7* 3.8  100 100  --  99* 93* CO2 19* 20* 21  --  21 16* * 106* 109*  --  108* 143* BUN 28* 25* 25*  --  25* 17  
CREA 1.54* 1.26 1.21  --  1.15 1.59* CA 8.5 8.4* 8.6  --  8.4* 10.3* MG 2.5  --  2.5  --  1.9 2.5 PHOS 6.8*  --  5.6*  --  3.4 8.1* ALB  --   --   --   --   --  3.1* ALT  --   --   --   --   --  97* INR  --   --  1.6* 1.6*  --  2.2* No results for input(s): PH, PCO2, PO2, HCO3, FIO2 in the last 72 hours. Recent Labs 04/19/21 
0249 04/18/21 
1553 04/18/21 
3166 FIO2I 35 40 70 HCO3I 18.7* 19.7* 21.9*  
PCO2I 29.7* 26.4* 27.8* PHI 7.41 7.47* 7.50* PO2I 93 95 229* Imaging: 
[x]   I have personally reviewed the patients radiographs and reports XR Results (most recent): 
Results from Hospital Encounter encounter on 04/17/21 XR ABD (KUB) Narrative History: NG tube placement. COMPARISON: None. TECHNIQUE: Single frontal view of the abdomen. FINDINGS: 
 
Patchy opacity at the left lung base. Limited leads noted. Scoliosis.  No acute bone findings. Nonspecific mildly prominent large bowel within the midabdomen. Moderate colonic fecal partially evaluated. Enteric tube is seen with tip and sidehole within the mid stomach. Impression Enteric tube as discussed. CT Results (most recent): 
Results from [facility] Encounter encounter on 04/17/21 CT SPINE CERV WO CONT Narrative CT SPINE CERV WO CONT: 4/17/2021 10:19 PM 
 
CLINICAL INFORMATION 
post rosc, fall. COMPARISON None. TECHNIQUE Axial CT images of the cervical spine acquired. Sagittal and coronal 
reformations performed. . The following CT dose reduction techniques were 
utilized: automated exposure control and/or adjustment of the mA and/or kV 
according to patient size, and the use of iterative reconstruction technique FINDINGS Vertebral body height and alignment: Normal. No evidence of acute fracture. Intervertebral disc height: Disproportionate intervertebral disc space narrowing 
at C4-C5 and C6-C7. Cervical medullary junction: Normal. 
 
Paraspinal tissues: Endotracheal and enteric tubes are seen passing. Nathalie Charity Degenerative changes: 
Disproportionate discogenic degenerative change at C6-C7. Facet ankylosis 
bilaterally at C2-C3. Lung apices: Emphysematous changes are present. Impression No acute osseous findings. IMPRESSION:  
· Acute on chronic Hypoxic Hypercarbic Respiratory Failure - Currently requiring mechanical ventilation. Intubated in the field by EMS 2/2 Cardiac Arrest.  
· PEA/Asystole Cardiac Arrest - aprox 24 min downtime. Unknown cause at this time. Consideration is prolonged QTc from psych medications · Lactic Acidosis - Initial LA 15.68. Likely 2/2 above, resolved · Acute Encephalopathy - 2/2 above. Concern for anoxic brain injury · Myoclonic jerking vs seizure activity, poor prognostic indicator in the setting of cardiac arrest  
· Type 2 NSTEMI 
· Hyponatremia,chronic · Hypokalemia · Elevated TSH · YAIMA, likely ATN w/ BUN/Creatinine ratio · Elevated CE's - Likely 2/2 above. Will trend. Cardiology Patient Active Problem List  
Diagnosis Code  Pneumonia due to COVID-19 virus U07.1, J12.82  
 Hypertension I10  
 Gastroesophageal reflux K21.9  Anemia D64.9  Hyponatremia E87.1  Nicotine dependence F17.200  Schizophrenia ([facility] [address] F20.9  Shortness of breath R06.02  
 Respiratory failure (HCC) J96.90  Cardiac arrest ([facility] [facility] I46.9 RECOMMENDATIONS:  
Resp:  
-Titrate FiO2/ supp O2 for SpO2 >90% 
-tolerating AC/PC 
-RR decreased from 24 to 18 in the setting of respiratory alkalosis  
-Not a candidate for ventilator liberation 2' high propofol/versed requirements and encephalopathy I/D:  
-Afebrile; aleukocytosis 
-COVID bio fire negative  
-F/u bcx 
-No abx at this time  
-D/c lactic acid Hem/Onc:  
-Daily CBC; H/H, and plts are stable CVS:  
-HD stable 
-F/u echo  
-Trend troponin Metabolic:  
-K+ replaced this AM, f/u BMP now 
-Na improving w/ acute treatment Renal:  
-Trend Renal indices; Diuresis, Friedman to BSD, Endocrine:  
-POC Glucose q6 
-Check Free T4 
GI:  
-SUP 
-Check KUB 
-IF OGT placement correct can start enteral feeds Musc/Skin:  
-No acute issues, wound care Neuro: 
-Continue propofol and versed for myoclonic jerks/Seizure/? 
-CT head nil acute 
-Cannot clear C-spine, brace in place 
-Neurology consultation -EEG 
-Starting Keppra 1000 q12h per neurology recs Code status 
-Full code Best practice : 
 
Glycemic control IHI ICU bundles: Friedman Bundle Followed and Vent Bundle Followed, Vent Day 2 Mech Vent patients- VAP bundle, aim to keep peak plateau pressure 36-11VO H2O Sress ulcer prophylaxis. DVT prophylaxis. Need for Lines, friedman assessed. Restraints need. Palliative care evaluation. High complexity decision making was performed during this consultation and evaluation. [x]       Pt is at high risk for further organ failure and dysfunction. Critical care time spent:  35  minutes with patient exclusive of procedures. Adia Sen PA-C 
04/19/21 Pulmonary, Critical Care Medicine [facility] Pulmonary / Critical Care Physician: 
 
Chart and note reviewed. Data reviewed. Seen on rounds earlier today. I have independently evaluated and examined the patient. I agree with the exam, assessment and plans outlined by ASUNCION Langston.  
 
In brief, my findings, evaluation and recommendations are as stated below: 
 
Planning for comfort care and terminal extubation. Rest of details and diagnostic/treatment plans per APC note.  
   
Abhijit Lee MD 
5:20 PM

## 2021-04-20 NOTE — PROGRESS NOTES
responded to Death of  Althea Mullins, who was a 62 y.o.,male,  
 
I was notified by the nurse that Mr. Ariela Townsend was pronounced dead at 18:30. I had spent some time with the family earlier in the day. I expressed silent prayers for comfort for patient's mother. Plan: 
Chaplains will continue to follow and will provide pastoral care on an as needed/requested basis and grief support for the family. Chaplain [facility]  
[phone]

## 2021-04-23 LAB
BACTERIA SPEC CULT: NORMAL
BACTERIA SPEC CULT: NORMAL
SERVICE CMNT-IMP: NORMAL
SERVICE CMNT-IMP: NORMAL

## 2021-09-08 NOTE — ROUTINE PROCESS
2330: TRANSFER - IN REPORT: 
 
Verbal report received from TIN Nelson(name) on Viry Flair  being received from ED(unit) for routine progression of care Report consisted of patients Situation, Background, Assessment and  
Recommendations(SBAR). Information from the following report(s) SBAR, ED Summary, Intake/Output, MAR, Recent Results, Med Rec Status, Cardiac Rhythm (NSR), Alarm Parameters  and Quality Measures was reviewed with the receiving nurse. Opportunity for questions and clarification was provided. Assessment completed upon patients arrival to unit and care assumed. 0730: Bedside and Verbal shift change report given to TIN Walker (oncoming nurse) by Markus Nelson RN (offgoing nurse). Report included the following information SBAR, ED Summary, Intake/Output, MAR, Recent Results, Med Rec Status, Cardiac Rhythm (Sinus Phylicia Ray), Alarm Parameters  and Quality Measures. Odomzo Counseling- I discussed with the patient the risks of Odomzo including but not limited to nausea, vomiting, diarrhea, constipation, weight loss, changes in the sense of taste, decreased appetite, muscle spasms, and hair loss.  The patient verbalized understanding of the proper use and possible adverse effects of Odomzo.  All of the patient's questions and concerns were addressed.

## 2023-05-31 NOTE — PROGRESS NOTES
RENAL PROGRESS NOTE Tami Solid Assessment/Plan: · Acute (and likely acute on chronic) hyponatremia. Asymptomatic at this time. Volume status is difficult to assess, it appears volume overload jordan admission related to decompensated chf has resolved. At this time Na is slowly improving, will try to tighten fluid restriction (although it can be difficult to enforce, especially in a patients with psychiatric illness). Haldol may also contribute to hyponatremia. May need tolvaptan. · Mild prachi due to ADHFrEF.       
· ADHFrEF/volume overload. Improved. Agree with holding diuretics prior to cath. · HTN. Stable, continue current regimen. Subjective: 
Patient complaints off: Feels better, no sob at rest.  
No CP/N/V. Is unaware of the need for fluid restriction. Patient Active Problem List  
Diagnosis Code  Pneumonia due to COVID-19 virus U07.1, J12.89  
 Hypertension I10  
 Gastroesophageal reflux K21.9  Anemia D64.9  Hyponatremia E87.1  Nicotine dependence F17.200  Schizophrenia ([facility] [address] F20.9  Shortness of breath R06.02  
 Respiratory failure (HCC) J96.90 Current Facility-Administered Medications Medication Dose Route Frequency Provider Last Rate Last Dose  ipratropium-albuterol (COMBIVENT RESPIMAT) 20 mcg-100 mcg inhalation spray  2 Puff Inhalation Q6H PRN Marivel Hicks MD      
 carvediloL (COREG) tablet 12.5 mg  12.5 mg Oral BID WITH MEALS Trupti Chauhan Jed Fallen      
 dexamethasone (DECADRON) 4 mg/mL injection 6 mg  6 mg IntraVENous Q24H Elvis Jones MD   6 mg at 10/27/20 2131  hydrALAZINE (APRESOLINE) tablet 50 mg  50 mg Oral TID Jessica Simmons MD   50 mg at 10/28/20 7939  acetaminophen (TYLENOL) tablet 650 mg  650 mg Oral Q6H PRN Miguel Martínez MD      
 Or  
  acetaminophen (TYLENOL) suppository 650 mg  650 mg Rectal Q6H PRN Flores Zhao MD      
 polyethylene glycol [facility]) packet 17 g  17 g Oral DAILY PRN Flores Zhao MD      
 enoxaparin (LOVENOX) injection 40 mg  40 mg SubCUTAneous DAILY Flores Zhao MD   40 mg at 10/28/20 1717  thiamine (B-1) 200 mg in 0.9% sodium chloride 100 mL IVPB  200 mg IntraVENous DAILY Mary Beth Whitmore PA-C   200 mg at 10/28/20 3913  folic acid (FOLVITE) 1 mg in 0.9% sodium chloride 50 mL ivpb   IntraVENous DAILY Mary Beth Whitmore PA-C      
 [Held by provider] lisinopriL (PRINIVIL, ZESTRIL) tablet 40 mg  40 mg Oral DAILY Flores Zhao MD   40 mg at 10/26/20 1454  hydrALAZINE (APRESOLINE) 20 mg/mL injection 10 mg  10 mg IntraVENous Q6H PRN Flores Zhao MD   10 mg at 10/27/20 0044  
 haloperidoL (HALDOL) tablet 10 mg  10 mg Oral DAILY Bill Zhou MD   10 mg at 10/28/20 9899  pantoprazole (PROTONIX) tablet 40 mg  40 mg Oral DAILY Bill Zhou MD   40 mg at 10/28/20 3327  trimethobenzamide (TIGAN) injection 200 mg  200 mg IntraMUSCular Q6H PRN Chas Trimble DO      
 
 
Objective Vitals:  
 10/28/20 3228 10/28/20 2242 10/28/20 0910 10/28/20 1219 BP: (!) 159/72  139/89 124/79 Pulse: 78  67 62 Resp: 20 20 20 Temp: 98.3 °F (36.8 °C)  97.8 °F (36.6 °C) 98.3 °F (36.8 °C) SpO2: 99% 99% 100% 97% Weight:      
Height:      
 
 
 
Intake/Output Summary (Last 24 hours) at 10/28/2020 1416 Last data filed at 10/28/2020 1254 Gross per 24 hour Intake 640 ml Output 4550 ml Net -3910 ml Admission weight: Weight: 88.6 kg (195 lb 6.4 oz) (10/25/20 2030) Last Weight Metrics: 
Weight Loss Metrics 10/27/2020 Today's Wt 195 lb BMI 28.8 kg/m2 Physical Assessment:  
 
General: NAD, alert and oriented. Neck: Pos jvd. LUNGS: diffusely diminished air entry, bl exp rhonchi. CVS EXM: S1, S2  RRR. Abdomen: soft, non tender. Lower Extremities:  trace edema. Lab CBC w/Diff Recent Labs 10/28/20 
5463 10/27/20 
0258 10/25/20 
2020 WBC 8.1 10.9 10.9 RBC 3.81* 3.64* 3.59* HGB 11.8* 11.3* 11.3* HCT 33.0* 31.3* 31.5*  263 276 GRANS 87* 88* 81* LYMPH 9* 5* 10* EOS 0 0 0 Chemistry Recent Labs 10/28/20 
0450 10/27/20 
1920 10/27/20 
1100 10/27/20 
1040 10/26/20 
1136  10/25/20 
2020 *  --   --  101*  104* 107*  --  178* * 122* 121* 120*  121* 120*  --  120*  
K 4.6  --   --  4.9  4.9 5.5  --  4.5  
CL 87*  --   --  86*  86* 83*  --  81* CO2 26  --   --  26 27 28  --  30  
BUN 25*  --   --  25*  26* 23*  --  18  
CREA 1.24  --   --  1.18  1.24 1.33*  --  1.29  
CA 8.3*  --   --  8.4*  8.6 9.2  --  9.2 AGAP 9  --   --  8  8 9  --  9  
BUCR 20  --   --  21*  21* 17  --  14  
AP  --   --   --  72  --   --  86  
TP  --   --   --  6.8  --   --  7.9 ALB  --   --   --  3.1*  3.1*  --   --  3.5 GLOB  --   --   --  3.7  --   --  4.4* AGRAT  --   --   --  0.8  --   --  0.8 PHOS 4.6  --   --  4.7  4.7 5.3*   < >  --   
 < > = values in this interval not displayed. Lab Results Component Value Date/Time Ferritin 32 10/26/2020 11:36 AM  
  
Lab Results Component Value Date/Time Calcium 8.3 (L) 10/28/2020 04:50 AM  
 Phosphorus 4.6 10/28/2020 04:50 AM  
  
 
Shy Book, M.D. [facility] Phone [phone] Pager [phone] = No assist / stand by assistance
